# Patient Record
Sex: FEMALE | HISPANIC OR LATINO | Employment: UNEMPLOYED | URBAN - METROPOLITAN AREA
[De-identification: names, ages, dates, MRNs, and addresses within clinical notes are randomized per-mention and may not be internally consistent; named-entity substitution may affect disease eponyms.]

---

## 2023-02-21 ENCOUNTER — OFFICE VISIT (OUTPATIENT)
Dept: OBGYN | Facility: CLINIC | Age: 33
End: 2023-02-21
Payer: COMMERCIAL

## 2023-02-21 VITALS
DIASTOLIC BLOOD PRESSURE: 84 MMHG | HEART RATE: 76 BPM | WEIGHT: 192.8 LBS | RESPIRATION RATE: 12 BRPM | SYSTOLIC BLOOD PRESSURE: 138 MMHG | TEMPERATURE: 96.7 F

## 2023-02-21 DIAGNOSIS — L90.0 LICHEN SCLEROSUS: ICD-10-CM

## 2023-02-21 DIAGNOSIS — N90.89 VULVAR MASS: Primary | ICD-10-CM

## 2023-02-21 PROCEDURE — 88305 TISSUE EXAM BY PATHOLOGIST: CPT | Performed by: PATHOLOGY

## 2023-02-21 PROCEDURE — 99203 OFFICE O/P NEW LOW 30 MIN: CPT | Mod: 25 | Performed by: OBSTETRICS & GYNECOLOGY

## 2023-02-21 PROCEDURE — 11420 EXC H-F-NK-SP B9+MARG 0.5/<: CPT | Performed by: OBSTETRICS & GYNECOLOGY

## 2023-02-21 RX ORDER — CLOBETASOL PROPIONATE 0.5 MG/G
OINTMENT TOPICAL DAILY PRN
Qty: 30 G | Refills: 3 | Status: SHIPPED | OUTPATIENT
Start: 2023-02-21 | End: 2023-08-14

## 2023-02-21 NOTE — NURSING NOTE
Initial /84 (BP Location: Right arm, Patient Position: Sitting, Cuff Size: Adult Regular)   Pulse 76   Temp (!) 96.7  F (35.9  C) (Tympanic)   Resp 12   Wt 87.5 kg (192 lb 12.8 oz)   LMP 02/14/2023 (Approximate)  There is no height or weight on file to calculate BMI. .

## 2023-02-22 NOTE — PROGRESS NOTES
[facility]    Gynecology Office Note    CC:   Chief Complaint   Patient presents with     Consult        HPI: Asmita Frye is a 33 year old who presents for removal of vulvar lesion as well as evaluation for possible vulvar condition. Patient reports a vulvar lesion starting to grow about 3 years ago. Has slowly enlarged since then. Lesion of question thought to be a skin tag. It is getting caught in her underwear and is very bothersome due to it's location in the groin. Otherwise is not causing pain, no drainage from the area. Additionally, reports a history of intermittent vulvar irritation and itching. Was evaluated and found to have a white area around her labia that she would like examined.     GYN Hx:     Patient's last menstrual period was 02/14/2023 (approximate).    Cycle: regular, monthly  Contraception: none, desiring pregnancy  Last Pap Smear: patient reports this was completed in the past year  Abnormal Pap Smears: denies  Sexual Activity: currently sexually active with     ROS: A 10 pt ROS was completed and found to be otherwise negative unless mentioned in the HPI.     PMH:   History reviewed. No pertinent past medical history.    PSHx:   History reviewed. No pertinent surgical history.    Medications:   No current outpatient medications on file prior to visit.  No current facility-administered medications on file prior to visit.      Allergies:    No Known Allergies    Social History:   Social History     Socioeconomic History     Marital status:      Spouse name: Not on file     Number of children: Not on file     Years of education: Not on file     Highest education level: Not on file   Occupational History     Not on file   Tobacco Use     Smoking status: Some Days     Types: Cigarettes     Smokeless tobacco: Never   Substance and Sexual Activity     Alcohol use: Not Currently     Drug use: Not on file     Sexual activity: Not on file   Other Topics  Concern     Not on file   Social History Narrative     Not on file     Social Determinants of Health     Financial Resource Strain: Not on file   Food Insecurity: Not on file   Transportation Needs: Not on file   Physical Activity: Not on file   Stress: Not on file   Social Connections: Not on file   Intimate Partner Violence: Not on file   Housing Stability: Not on file         Family History:   History reviewed. No pertinent family history.    Physical Exam:   Vitals:    02/21/23 1342   BP: 138/84   BP Location: Right arm   Patient Position: Sitting   Cuff Size: Adult Regular   Pulse: 76   Resp: 12   Temp: (!) 96.7  F (35.9  C)   TempSrc: Tympanic   Weight: 87.5 kg (192 lb 12.8 oz)      There is no height or weight on file to calculate BMI.    General appearance: well-hydrated, A&O x 3, no apparent distress  Lungs: Equal expansion bilaterally, no accessory muscle use  Heart: No heaves or thrills.   Constitutional: See vitals  Neuro: CN II-XII grossly intact  Genitourinary:  External genitalia: vulvar lesion on the right inferior lateral labia majora, bulbous skin tag appearing lesion, approximately 1.5cm in diameter. Additionally on the labia minor and majora, white skin changes consistent with lichen sclerosus. Fusion of labia minora or majora.   Anus and Perineum: white skin changes in the perineum consistent with lichen sclerosus, not involving much of the perianal tissue  Vagina: Normal, healthy pink mucosa without any lesions. Physiologic vaginal discharge.     Procedure Note    Asmita Frye  1990  [id number]    Indications:    Asmita Frye is a 33 year old year old female, who is here for removal of vulvar lesion.     The patient was counseled on the risks (including including risk of infection, bleeding, recurrence), benefits, and alternatives of the procedure. Verbal and written consent were obtained.    Procedure:     Patient was placed in the dorsal lithotomy position with legs  supported in [facility]. The lesion of question was located on the right inferior lateral labia majora, approximately 1.5cm in diameter, bulbous type of skin tag. The area was cleaned with betadine swabs. Lidocaine 1% was injected for local anesthetic. An elliptical incision was made to excise the lesion. The incision was closed in two layers using #0 Vicryl.     Post Procedure:    There were no complications. Patient tolerated the procedure well. She was given post op instructions which included activity and pelvic restrictions.  Pathology pending, further intervention as needed based on results.       Assessment and Plan:     Encounter Diagnoses   Name Primary?     Lichen sclerosus      Vulvar mass Yes     Vulvar lesion removed today.     Exam also consistent with lichen sclerosus. Discussed diagnosis, treatment, and prognosis. Discussed chronic nature of condition and association with JEMMA, need for annual evaluation. Rx for clobetasol sent to pharmacy. Instructed to use as needed, currently asymptomatic.     Health maintenance: per patient, pap smear is up to date, no history of abnormals    Return to clinic annually and as needed.    Melodie Sesay,

## 2023-03-05 LAB
PATH REPORT.COMMENTS IMP SPEC: NORMAL
PATH REPORT.COMMENTS IMP SPEC: NORMAL
PATH REPORT.FINAL DX SPEC: NORMAL
PATH REPORT.GROSS SPEC: NORMAL
PATH REPORT.MICROSCOPIC SPEC OTHER STN: NORMAL
PATH REPORT.RELEVANT HX SPEC: NORMAL

## 2023-04-05 ENCOUNTER — TELEPHONE (OUTPATIENT)
Dept: OBGYN | Facility: CLINIC | Age: 33
End: 2023-04-05
Payer: COMMERCIAL

## 2023-04-05 NOTE — LETTER
April 5, 2023      Asmita Frye  [id number] [facility] [id number]    Dear Asmita,    To ensure we are providing the best quality care, we have reviewed your chart and see that you are due for:    Cervical Cancer Screening:    Please call Dept: [phone] to schedule an Annual Exam with Pap Smear.    If you have completed the tests outside of [facility], please have the results forwarded to our office Fax # [phone]. We will update the chart for your primary Physician to review before your next annual physical.    Thank you for trusting us with your health care.          Sincerely,      Melodie Sseay DO

## 2023-04-05 NOTE — TELEPHONE ENCOUNTER
Panel Management Review      Health Maintenance List    Health Maintenance   Topic Date Due     NICOTINE/TOBACCO CESSATION COUNSELING Q 1 YR  Never done     ADVANCE CARE PLANNING  Never done     Pneumococcal Vaccine: Pediatrics (0 to 5 Years) and At-Risk Patients (6 to 64 Years) (1 - PCV) Never done     HIV SCREENING  Never done     HEPATITIS C SCREENING  Never done     PAP  Never done     YEARLY PREVENTIVE VISIT  11/26/2019     PHQ-2 (once per calendar year)  Never done     DTAP/TDAP/TD IMMUNIZATION (3 - Td or Tdap) 03/27/2031     INFLUENZA VACCINE  Completed     HEPATITIS B IMMUNIZATION  Completed     COVID-19 Vaccine  Completed     IPV IMMUNIZATION  Aged Out     MENINGITIS IMMUNIZATION  Aged Out       Composite cancer screening  Chart review shows that this patient is due/due soon for the following Pap Smear  No results found for: PAP  No past surgical history on file.    Is hysterectomy listed in surgical history? No   Is mastectomy listed in surgical history? No     Summary:    Patient is due/failing the following:   Pap Smear    Action needed: Patient needs office visit for Pap smear.    Type of outreach:  Sent letter.      Staff Signature:  Don RIVERA

## 2023-05-18 ENCOUNTER — VIRTUAL VISIT (OUTPATIENT)
Dept: OBGYN | Facility: CLINIC | Age: 33
End: 2023-05-18
Payer: COMMERCIAL

## 2023-05-18 DIAGNOSIS — Z34.80 PRENATAL CARE, SUBSEQUENT PREGNANCY: ICD-10-CM

## 2023-05-18 PROCEDURE — 99207 PR NO CHARGE NURSE ONLY: CPT

## 2023-05-18 RX ORDER — PRENATAL VIT/IRON FUM/FOLIC AC 27MG-0.8MG
1 TABLET ORAL DAILY
COMMUNITY

## 2023-05-18 NOTE — PROGRESS NOTES
Lifestyle and nutrition teaching completed  with using over the phone   Dora Gonzalez OB Intake Nurse    Patient supplied answers from flow sheet for:  Prenatal OB Questionnaire.  Past Medical History  Have you ever recieved care for your mental health? : No  Have you ever been in a major accident or suffered serious trauma?: No  Within the last year, has anyone hit, slapped, kicked or otherwise hurt you?: No  In the last year, has anyone forced you to have sex when you didn't want to?: No    Past Medical History 2   Have you ever received a blood transfusion?: No  Would you accept a blood transfusion if was medically recommended?: Yes  Does anyone in your home smoke?: (!) Yes ()   Is your blood type Rh negative?: Unknown  Have you ever ?: (!) Yes  Have you been hospitalized for a nonsurgical reason excluding normal delivery?: No  Have you ever had an abnormal pap smear?: No    Past Medical History (Continued)  Do you have a history of abnormalities of the uterus?: No  Did your mother take SYD or any other hormones when she was pregnant with you?: Unknown  Do you have any other problems we have not asked about which you feel may be important to this pregnancy?: No

## 2023-05-21 LAB
ABO/RH(D): NORMAL
ANTIBODY SCREEN: NEGATIVE
SPECIMEN EXPIRATION DATE: NORMAL

## 2023-05-22 ENCOUNTER — PRENATAL OFFICE VISIT (OUTPATIENT)
Dept: OBGYN | Facility: CLINIC | Age: 33
End: 2023-05-22
Payer: COMMERCIAL

## 2023-05-22 VITALS
TEMPERATURE: 99.6 F | HEART RATE: 86 BPM | BODY MASS INDEX: 37.95 KG/M2 | HEIGHT: 61 IN | WEIGHT: 201 LBS | SYSTOLIC BLOOD PRESSURE: 122 MMHG | RESPIRATION RATE: 16 BRPM | DIASTOLIC BLOOD PRESSURE: 70 MMHG

## 2023-05-22 DIAGNOSIS — E66.812 CLASS 2 OBESITY WITHOUT SERIOUS COMORBIDITY IN ADULT, UNSPECIFIED BMI, UNSPECIFIED OBESITY TYPE: ICD-10-CM

## 2023-05-22 DIAGNOSIS — Z34.81 PRENATAL CARE, SUBSEQUENT PREGNANCY IN FIRST TRIMESTER: Primary | ICD-10-CM

## 2023-05-22 DIAGNOSIS — O21.9 NAUSEA AND VOMITING DURING PREGNANCY: ICD-10-CM

## 2023-05-22 LAB
ALBUMIN UR-MCNC: NEGATIVE MG/DL
APPEARANCE UR: CLEAR
BACTERIA #/AREA URNS HPF: ABNORMAL /HPF
BILIRUB UR QL STRIP: NEGATIVE
COLOR UR AUTO: YELLOW
ERYTHROCYTE [DISTWIDTH] IN BLOOD BY AUTOMATED COUNT: 13 % (ref 10–15)
GLUCOSE UR STRIP-MCNC: NEGATIVE MG/DL
HCT VFR BLD AUTO: 36.5 % (ref 35–47)
HGB BLD-MCNC: 12.4 G/DL (ref 11.7–15.7)
HGB UR QL STRIP: NEGATIVE
KETONES UR STRIP-MCNC: NEGATIVE MG/DL
LEUKOCYTE ESTERASE UR QL STRIP: NEGATIVE
MCH RBC QN AUTO: 31.1 PG (ref 26.5–33)
MCHC RBC AUTO-ENTMCNC: 34 G/DL (ref 31.5–36.5)
MCV RBC AUTO: 92 FL (ref 78–100)
NITRATE UR QL: NEGATIVE
PH UR STRIP: 7 [PH] (ref 5–7)
PLATELET # BLD AUTO: 280 10E3/UL (ref 150–450)
RBC # BLD AUTO: 3.99 10E6/UL (ref 3.8–5.2)
RBC #/AREA URNS AUTO: ABNORMAL /HPF
SP GR UR STRIP: <=1.005 (ref 1–1.03)
SQUAMOUS #/AREA URNS AUTO: ABNORMAL /LPF
UROBILINOGEN UR STRIP-ACNC: 0.2 E.U./DL
WBC # BLD AUTO: 8.9 10E3/UL (ref 4–11)
WBC #/AREA URNS AUTO: ABNORMAL /HPF

## 2023-05-22 PROCEDURE — 81001 URINALYSIS AUTO W/SCOPE: CPT | Performed by: OBSTETRICS & GYNECOLOGY

## 2023-05-22 PROCEDURE — 87340 HEPATITIS B SURFACE AG IA: CPT | Performed by: OBSTETRICS & GYNECOLOGY

## 2023-05-22 PROCEDURE — 86762 RUBELLA ANTIBODY: CPT | Performed by: OBSTETRICS & GYNECOLOGY

## 2023-05-22 PROCEDURE — 87491 CHLMYD TRACH DNA AMP PROBE: CPT | Performed by: OBSTETRICS & GYNECOLOGY

## 2023-05-22 PROCEDURE — 87086 URINE CULTURE/COLONY COUNT: CPT | Performed by: OBSTETRICS & GYNECOLOGY

## 2023-05-22 PROCEDURE — 36415 COLL VENOUS BLD VENIPUNCTURE: CPT | Performed by: OBSTETRICS & GYNECOLOGY

## 2023-05-22 PROCEDURE — 86850 RBC ANTIBODY SCREEN: CPT | Performed by: OBSTETRICS & GYNECOLOGY

## 2023-05-22 PROCEDURE — 87389 HIV-1 AG W/HIV-1&-2 AB AG IA: CPT | Performed by: OBSTETRICS & GYNECOLOGY

## 2023-05-22 PROCEDURE — 86901 BLOOD TYPING SEROLOGIC RH(D): CPT | Performed by: OBSTETRICS & GYNECOLOGY

## 2023-05-22 PROCEDURE — G0145 SCR C/V CYTO,THINLAYER,RESCR: HCPCS | Performed by: OBSTETRICS & GYNECOLOGY

## 2023-05-22 PROCEDURE — 85027 COMPLETE CBC AUTOMATED: CPT | Performed by: OBSTETRICS & GYNECOLOGY

## 2023-05-22 PROCEDURE — 86803 HEPATITIS C AB TEST: CPT | Performed by: OBSTETRICS & GYNECOLOGY

## 2023-05-22 PROCEDURE — 86900 BLOOD TYPING SEROLOGIC ABO: CPT | Performed by: OBSTETRICS & GYNECOLOGY

## 2023-05-22 PROCEDURE — 86780 TREPONEMA PALLIDUM: CPT | Performed by: OBSTETRICS & GYNECOLOGY

## 2023-05-22 PROCEDURE — 87624 HPV HI-RISK TYP POOLED RSLT: CPT | Performed by: OBSTETRICS & GYNECOLOGY

## 2023-05-22 PROCEDURE — 87591 N.GONORRHOEAE DNA AMP PROB: CPT | Performed by: OBSTETRICS & GYNECOLOGY

## 2023-05-22 PROCEDURE — 76817 TRANSVAGINAL US OBSTETRIC: CPT | Performed by: OBSTETRICS & GYNECOLOGY

## 2023-05-22 PROCEDURE — 99213 OFFICE O/P EST LOW 20 MIN: CPT | Mod: 25 | Performed by: OBSTETRICS & GYNECOLOGY

## 2023-05-22 RX ORDER — ONDANSETRON 4 MG/1
4 TABLET, ORALLY DISINTEGRATING ORAL EVERY 8 HOURS PRN
Qty: 15 TABLET | Refills: 3 | Status: SHIPPED | OUTPATIENT
Start: 2023-05-22 | End: 2023-08-14

## 2023-05-22 RX ORDER — PRENATAL VIT/IRON FUM/FOLIC AC 27MG-0.8MG
1 TABLET ORAL DAILY
Qty: 90 TABLET | Refills: 3 | Status: ON HOLD | OUTPATIENT
Start: 2023-05-22 | End: 2023-12-23

## 2023-05-22 NOTE — PROGRESS NOTES
[facility]    New OB Visit Note    CC: New OB     Subjective:    Asmita is a 33 year old  at 8w0d by LMP who presents for her initial OB visit. History of long cycles, about every 6 weeks. This is a planned pregnancy and her and her  are excited. She reports feeling well. Denies any uterine cramping, abdominal pain or vaginal bleeding. Reports some nausea and vomiting.     Past OB History:    Prenatal Complications: None   Delivery Complications: C section for failure to descend (question failed vacuum per history). Repeat C section.    OB History    Para Term  AB Living   3 2 2 0 0 2   SAB IAB Ectopic Multiple Live Births   0 0 0 0 2      # Outcome Date GA Lbr [place]/2nd Weight Sex Delivery Anes PTL Lv   3 Current            2 Term 16 39w0d  3.43 kg (7 lb 9 oz) M CS-Unspec   ROYAL      Birth Comments: CAN      Name: Mario Barrientos Term 12/29/10 40w0d  3.629 kg (8 lb) M CS-Unspec   ROYAL      Complications: Failure to Progress in Second Stage      Name: Roman       Past Medical History:   Diagnosis Date     Chickenpox        Past Surgical History:   Procedure Laterality Date     APPENDECTOMY         Current Outpatient Medications   Medication Sig Dispense Refill     ondansetron (ZOFRAN ODT) 4 MG ODT tab Take 1 tablet (4 mg) by mouth every 8 hours as needed for nausea 15 tablet 3     Prenatal Vit-Fe Fumarate-FA (PRENATAL MULTIVITAMIN W/IRON) 27-0.8 MG tablet Take 1 tablet by mouth daily 90 tablet 3     Prenatal Vit-Fe Fumarate-FA (PRENATAL MULTIVITAMIN W/IRON) 27-0.8 MG tablet Take 1 tablet by mouth daily       clobetasol (TEMOVATE) 0.05 % external ointment Apply topically daily as needed (For itching) (Patient not taking: Reported on 2023) 30 g 3       Family History   Problem Relation Age of Onset     Heart Disease Father        Social History     Tobacco Use     Smoking status: Former     Types: Cigarettes     Quit date: 2023     Years since quittin.3  "    Smokeless tobacco: Never   Vaping Use     Vaping status: Never Used   Substance Use Topics     Alcohol use: Not Currently       ROS: A 10 pt ROS was completed and found to be negative unless mentioned in the HPI.     Objective:    /70 (BP Location: Left arm, Patient Position: Chair, Cuff Size: Adult Regular)   Pulse 86   Temp 99.6  F (37.6  C) (Tympanic)   Resp 16   Ht 1.549 m (5' 1\")   Wt 91.2 kg (201 lb)   LMP 2023   BMI 37.98 kg/m      Estimated body mass index is 37.98 kg/m  as calculated from the following:    Height as of this encounter: 1.549 m (5' 1\").    Weight as of this encounter: 91.2 kg (201 lb).    General appearance: well-hydrated, A&O x 3, no apparent distress  Lungs: Equal expansion bilaterally, no accessory muscle use  Heart: No heaves or thrills. No peripheral varicosities  Constitutional: See vitals  Extremities: no edema  Genitourinary:  External genitalia: no erythema, no lesions.   Anus and Perineum: Unremarkable, no visible lesions  Vagina: Normal, healthy pink mucosa without any lesions. Physiologic vaginal discharge.   Cervix: normal appearance, no cervical motion tenderness.   Uterus: gravid    Bedside Ultrasound    Transvaginal ultrasound was performed. A single live intrauterine pregnancy was seen.      CRL= 1.06 cm   FHT= 154 bpm  EGA= 7 weeks 1 days  EDC based on US = 24  EDC by LMP= 24  FINAL EDC= 24    Assessment and Plan:     Encounter Diagnoses   Name Primary?     Prenatal care, subsequent pregnancy in first trimester Yes     Nausea and vomiting during pregnancy      Class 2 obesity without serious comorbidity in adult, unspecified BMI, unspecified obesity type        33 year old  at 7w1d by US today who presents for her initial OB visit.     1) Plan to draw new OB lab today   2) Discussed routine prenatal care, group practice model at [facility], tertiary support and frequency of visits. Also discussed options for genetic screening " tests. Patient declines genetic screening.   3) Dating/viability US performed today   4) Concerns: Some nausea, Rx for Zofran  5) PMH/OBHx problems: History of C section x2: likely would recommend repeat C section due to failure to descend and 2 prior C sections. Would need delivery at [facility] if she desires T[facility]. Will continue to review options and fetal size at time of delivery.   6) Medication review: see above, continue prenatal vitamin   7) Reviewed miscarriage precautions (present to ED or call clinic with abdominal pain, vaginal bleeding or fever)   8) Weight gain: discussed weight gain expectations (BMI <18.5: 28-40lbs, BMI 18.5-25: 25-35lbs, BMI 25-30: 15-25lbs, BMI >30: 11-20lbs)   9) PAP smear: collected today  10) Delivery plan: continue to discuss route of delivery, breastfeeding, pp contraception, pain management in labor  11) Immunizations: Tdap at 28wks  12) No increased risk for gestational diabetes, plan for screen at 28wks     Return to clinic in 4 weeks.     Melodie Sesay DO

## 2023-05-22 NOTE — PATIENT INSTRUCTIONS
"    Pregnancy: Your First Trimester Changes  The first trimester is a time of rapid development for your baby. Because your baby is growing so quickly, it is important that you start a healthy lifestyle right away. By the end of the first trimester, your baby has formed all of its major body organs and weighs just over an ounce.  Month 1 (weeks 1 to 4)  The placenta (the organ that nourishes your baby) begins to form. The brain, spinal cord, heart, gastrointestinal tract, and lungs begin to develop. Your baby is about   inch long by the end of the first month.     Actual size of baby is 1/4\".     Month 2 (weeks 5 to 8)  All of your baby s major body organs form. The face, fingers, toes, ears, and eyes appear. By the end of the month, your baby is about 1 inch long.     Actual size of baby is 1\".     Month 3 (weeks 9 to 12)  Your baby can open and close its fists and mouth. The sexual organs begin to form. As the first trimester ends, your baby is about 3 inches long.     Actual size of baby is 3\".     [employer] last reviewed this educational content on 8/1/2020 2000-2022 [employer]. All rights reserved. This information is not intended as a substitute for professional medical care. Always follow your healthcare professional's instructions.          Vitamin B6 (pyridoxine) Oral Tablet  Uses  This medicine is used for the following purposes:    metabolism disorder    nausea and vomiting    vitamin deficiency  Instructions  This medicine may be taken with or without food.  Store at room temperature away from heat, light, and moisture. Do not keep in the bathroom.  If you forget to take a dose on time, take it as soon as you remember. If it is almost time for the next dose, do not take the missed dose. Return to your normal dosing schedule. Do not take 2 doses of this medicine at one time.  Drug interactions can change how medicines work or increase risk for side effects. Tell your health care providers " about all medicines taken. Include prescription and over-the-counter medicines, vitamins, and herbal medicines. Speak with your doctor or pharmacist before starting or stopping any medicine.  Speak with your doctor or pharmacist before starting any other vitamins.  It is very important that you follow your doctor's instructions for all blood tests.  Cautions  Tell your doctor and pharmacist if you ever had an allergic reaction to a medicine.  Do not use the medication any more than instructed.  Tell the doctor or pharmacist if you are pregnant, planning to be pregnant, or breastfeeding.  Side Effects  If you have any of the following side effects, you may be getting too much medicine. Please contact your doctor to let them know about these side effects.    drowsiness or sedation    numbness or tingling in hands and feet    headaches    nausea    stomach upset or abdominal pain  This medicine usually has no side effects.  A few people may have an allergic reaction to this medicine. Symptoms can include difficulty breathing, skin rash, itching, swelling, or severe dizziness. If you notice any of these symptoms, seek medical help quickly.  Extra  Please speak with your doctor, nurse, or pharmacist if you have any questions about this medicine.  [email or web address]  IMPORTANT NOTE: This document tells you briefly how to take your medicine, but it does not tell you all there is to know about it. Your doctor or pharmacist may give you other documents about your medicine. Please talk to them if you have any questions. Always follow their advice. There is a more complete description of this medicine available in English. Scan this code on your smartphone or tablet or use the web address below. You can also ask your pharmacist for a printout. If you have any questions, please ask your pharmacist. The display and use of this drug information is subject to Terms of Use. Copyright(c) 2022 [employer]     4114-7498 [employer]. All rights reserved. This information is not intended as a substitute for professional medical care. Always follow your healthcare professional's instructions.          Adapting to Pregnancy: First Trimester  As your body adjusts during your first trimester of pregnancy, you may have to change or limit your daily activities. You ll need more rest. You may also need to use the energy you have more wisely.   Your changing body  Almost every part of your body is affected as you adapt to pregnancy. The uterus and cervix will start to soften right away. You may not look very pregnant during the first 3 months. But you are likely to have some common signs of early pregnancy:     Nausea    Fatigue    Frequent urination    Mood swings    Bloating of the belly    Constipation    Heartburn    Missed or light periods (first trimester bleeding)    Nipple or breast tenderness and breast swelling  It s not too late to start good habits   What matters most is protecting your baby from this moment on. If you smoke, drink alcohol, or use drugs, now is the time to stop. If you need help, talk with your healthcare provider:     Smoking increases the risk of stillbirth or having a low-birth-weight baby. If you smoke, quit now.    Alcohol and drugs have been linked with miscarriage, birth defects, intellectual disability, and low birth weight. Don't drink alcohol or take drugs.  Tips to relieve nausea  During pregnancy, nausea can happen at any time of the day, but it may be worse in the morning. To help prevent nausea:     Eat small, light meals at frequent intervals.    Drink fluids often.    Get up slowly. Eat a few unsalted crackers before you get out of bed.    Avoid smells that bother you.    Avoid spicy and fatty foods.    Eat an ice pop in your favorite flavor.    Get plenty of rest.    Ask your healthcare provider about taking jacinta or vitamin B6 for nausea and vomiting.    Talk  with your healthcare provider if you take vitamins that upset your stomach.   Work concerns  The end of the first trimester is a good time to discuss working during pregnancy with your employer. Follow your healthcare provider s advice if your job needs you to stand for a long time, work with hazardous tools, or even sit at a desk all day. Your workspace, workload, or scheduled hours may need to be adjusted. Perhaps you can change body postures more often or take an extra break.   Advice for travel  Talk to your healthcare provider first, but the second trimester may be the best time for any travel. You may be advised to avoid certain trips while you re pregnant. Food and water can be concerns in developing countries. Travel by car is a good choice, as you can stop, get out, and stretch. Bring snacks and water along. Fasten the lap belt below your belly, low over your hips. Also be sure to wear the shoulder harness.   Intimacy  Unless your healthcare provider tells you to, there's no reason to stop having sex while you re pregnant. You or your partner may notice changes in desire. Desire may be less in the first trimester, due to nausea and fatigue. In the second trimester, sex may be very enjoyable. The third trimester can be a challenge comfort-wise. Try different positions and see what s best for you both.   [employer] last reviewed this educational content on 4/1/2020 2000-2022 [employer]. All rights reserved. This information is not intended as a substitute for professional medical care. Always follow your healthcare professional's instructions.          Pregnancy: Common Questions  There are plenty of myths and  old wives  tales  about pregnancy. You may need help  fact from fiction. On this sheet, you ll find answers to a few common questions. If you have other questions, talk with your healthcare provider.    Will working harm my baby?  In most cases, working throughout your  pregnancy is not harmful at all. There may be concerns if the job involves dangerous machinery or chemicals, lifting, or standing for very long periods of time. Talk with your healthcare provider and employer about your particular job and pregnancy.  Is it safe to have sex during pregnancy?  Yes, unless you are specifically advised not to by your healthcare provider.  Why can t I change the cat litter box?  Cats carry a disease called toxoplasmosis. In adult humans, it shows up as a mild infection of the blood and organs. If you are infected during pregnancy, the baby s brain and eyes could be damaged. To be safe, have someone else change the litter. If you must handle it, wear a paper mask over your nose and mouth. Also, wear gloves and wash your hands afterward.  Which medicines are safe?  No prescription or over-the-counter medicine is safe for everyone all of the time. But sometimes medicines are needed. Be sure your healthcare provider knows you are pregnant. Then use only the medicines he or she advises you to take.  Is it true that I can overheat my baby?  Yes. To prevent making your baby too warm:    Don t sit in a Jacuzzi. A long, warm bath is fine, but not in water over 100 F (37.7 C).    Exercise less intensely if you feel tired. Base your workout on how you feel, not your heart rate. Heart rates aren t a good way to measure effort during pregnancy.  Can I lift and carry safely?  Yes, if your healthcare provider doesn t tell you otherwise. Learn to lift and carry safely to prevent injury and reduce back pain during pregnancy. To protect your back:    Bend at the knees to bring the load nearer.    Get a good . Test the weight of the load.    Tighten your belly. Exhale as you lift.    Lift with your legs, not with your back.    Carry the load close to your body.    Hold the load so you can see where you are going.  What if I get sick?  Most women get sick at least once during pregnancy. Talk with your  healthcare provider if you do. Most likely it will not affect your pregnancy. Get plenty of rest and fluids, and eat what you can. Talk with your provider before taking any medicines.  [employer] last reviewed this educational content on 8/1/2020 2000-2022 [employer]. All rights reserved. This information is not intended as a substitute for professional medical care. Always follow your healthcare professional's instructions.          Comfort Tips During Pregnancy  Pregnancy can bring discomfort of different kinds. Below are tips for ways to feel better. Talk with your healthcare provider before using pain-relieving medicine at any time during your pregnancy.    First trimester tips  Easing nausea    Get up slowly. Eat a few unsalted crackers before you get out of bed.    Avoid smells that bother you.    Eat small, bland, low-fat, high-protein meals at frequent intervals.    Sip on water, weak tea, or clear soft drinks, like ginger ale. Eat ice chips.    Try taking vitamin B6.  Coping with fatigue    Take catnaps when you can.    Get regular exercise.    Accept help from others.    Practice good sleep habits, like going to bed and getting up at the same time each day. Use your bed only for sleep and sex.  Calming mood swings    Talk about your feelings with others, including other mothers.    Limit sugar, chocolate, and caffeine.    Eat a healthy diet. Don t skip meals.    Get regular exercise.  Soothing headaches    Get fresh air and exercise.    Relax and get enough rest.    Check with your healthcare provider before taking any pain medicines.    Second trimester tips    To limit ankle swelling, sit with your feet raised or wear support hose.    If you have pain in your groin and stomach (round ligament pain), don't make sudden twisting movements with your body.    For leg cramps, flexing your foot often brings immediate relief. Also try massaging your calf in long, downward strokes, or stretching  your legs before going to bed. Get enough exercise and wear shoes with flexible soles. Eat foods rich in calcium.    Third trimester tips  Reducing heartburn    Eat small, light meals throughout the day rather than 3 large ones.    Sleep with your upper body raised 6 inches. Don t lie down until 2 hours after you eat.    Don't eat greasy, fried, or spicy foods.    Don't have citrus fruits or juices.  Treating constipation    Eat foods high in fiber, such as whole-grain foods, and fresh fruit and vegetables).    Drink plenty of water.    Get regular exercise.    Ask about your healthcare provider about medicines that have docusate or psyllium.  Taking care of your breasts    Don't use harsh soaps or alcohol, which can make your skin too dry.    Wear nursing bras. They provide more support than regular bras and can be used after pregnancy if you breastfeed.  Getting a good night s sleep    Take a warm shower before bed.    Sleep on a firm mattress.    Lie on your side with 1 leg crossed over the other.    Use pillows to support your arms, legs, and belly.    [employer] last reviewed this educational content on 8/1/2020 2000-2022 [employer]. All rights reserved. This information is not intended as a substitute for professional medical care. Always follow your healthcare professional's instructions.          Folic Acid Supplements  Folic acid is also called folate. It is one of the B vitamins. Nutrition experts are just starting to learn more about how folic acid helps the body. It is needed to prevent a shortage of red blood cells (a type of anemia). Experts have also found that folic acid can prevent some birth defects.     How much folic acid do you need?  Adults should have 400 mcg (micrograms) of folic acid each day. Adults may need more if they are planning to get pregnant, are pregnant, or are breastfeeding. Talk with your healthcare provider to find the right amount of folic acid for you.   Why  use a supplement?  Taking folic acid both before and during pregnancy is important. This can prevent birth defects of the spine and brain (neural tube defects). A supplement may also be helpful if you drink alcohol often. You may want to use a folic acid supplement if any of the following is true for you:   ? I am a person of childbearing age.   ? I am planning to get pregnant.  ? I rarely eat leafy green vegetables, dried beans, or lentils.   ? I rarely eat cereal and whole grains (wheat germ, brown rice, whole-wheat bread).  ? I often have more than 1 drink of alcohol a day.   If you take folic acid  Here are some tips to help you get the most from a folic acid supplement:    Read the label to be sure the product won't  soon.    Choose a supplement that provides 400 to 800 mcg of folic acid.    Store the supplement in a cool, dry place, away from sun and heat.    Eat a healthy diet to get all the nutrients your body needs.  Foods that have folic acid  Folic acid is found mainly in plants. Some good sources include:    Dark green leafy vegetables such as spinach, kale, collards, and mary lettuce    Lentils, chickpeas, and dried beans such as sheehan, kidney, and black beans    Asparagus, bok bing, broad-beans, broccoli, and [place] sprouts    Avocados, oranges, and orange juice    Wheat germ and whole-wheat products    Products fortified with folic acid, such as cereal, pasta, bread, and rice  [employer] last reviewed this educational content on 2022-2022 [employer]. All rights reserved. This information is not intended as a substitute for professional medical care. Always follow your healthcare professional's instructions.          Anemia During Pregnancy  Anemia is a condition in which the red blood cell count is too low. In pregnant women, this is often caused by not having enough iron in the blood. Anemia is common in pregnancy and very easy to treat.   Why you need iron   While  pregnant, your body uses iron to make red blood cells for you and your baby. These cells bring oxygen to your baby and to the rest of your body. Not having enough red blood cells can cause your baby to be born too small. But this is rare, as it s easy for you to get enough iron.   Testing for anemia   The only way to know if you have anemia is to have a simple test called a CBC (complete blood count). This is a routine test that will be done at one of your first prenatal visits. This test may be done again, at about week 26 to week 28.   Treating anemia   If you have anemia due to low iron content, follow the advice of your healthcare provider. Eating foods high in iron and taking supplements can help you get the iron you need.   Eating foods high in iron      Green leafy vegetables and nuts are a good source of iron.     Eat foods that are high in iron such as:     Red meat (limit organ meats such as liver)    Seafood (be sure it s fully cooked), and don't eat fish that are high in mercury, such as swordfish, tilefish, adelaida mackerel, and shark    Tofu    Eggs    Green, leafy vegetables    Whole grains and iron-fortified cereals    Dried fruits and nuts  Taking iron supplements   In most cases, a prenatal vitamin can provide enough iron. But if you need more, your healthcare provider may prescribe an iron supplement. Swallow iron pills with a glass of orange or cranberry juice. The vitamin C in these fruit juices can help your body absorb iron. But don t take your iron pills with juices that have calcium added to them. They can keep your body from absorbing the iron.   Iron supplements   Iron supplements may have certain side effects. They may cause your stools to turn black, and make you feel sick to your stomach or constipated. Here are some tips that may help you limit side effects:     Start slowly. Take 1 pill a day for a few days. Then work up to your prescribed dose over time.    Take your pills with meals,  and not at bedtime.    Increase the fiber in your diet. Eat more whole grains, fruits, and vegetables.    Do mild exercise each day.    If advised by your healthcare provider, take a stool softener.  [employer] last reviewed this educational content on 2/1/2020 2000-2022 [employer]. All rights reserved. This information is not intended as a substitute for professional medical care. Always follow your healthcare professional's instructions.          Rh-Negative Screening  If you have Rh-negative blood, your baby may be at risk for health problems. This is true only if your baby has Rh-positive blood. A simple test followed by treatment can help prevent problems.   What are the risks?  If the blood of your baby is Rh positive, your Rh-negative blood may form antibodies. These antibodies will attack the Rh-positive blood. This is called Rh disease. Rh disease can cause your baby to lose blood cells or have other health problems. Medical treatment can prevent Rh disease by keeping antibodies from forming.   How are you tested?  A simple blood test shows if you re Rh negative. This test is done very early in your pregnancy. If you re Rh negative, you ll have a second blood test near week 28 of pregnancy. This test will check whether or not your blood contains Rh antibodies.     [employer] last reviewed this educational content on 4/1/2020 2000-2022 [employer]. All rights reserved. This information is not intended as a substitute for professional medical care. Always follow your healthcare professional's instructions.          What Is Prenatal Care?  Before getting pregnant, you may have added some good health habits to get ready for your baby. But if you didn t, start today. One of the first steps is learning how to take care of yourself. See your healthcare provider as soon as you think you may be pregnant. Then continue prenatal care during your pregnancy.     Prenatal care helps you  have a healthy baby   During prenatal care:    Your healthcare provider checks the health of your pregnancy. They'll calculate a due date. This gives an estimate of your baby's delivery. Many people give birth between 38 and 41 weeks of pregnancy. Your due date is found by counting 40 weeks from the first day of your last menstrual period.    Your pregnancy's progress is checked. This includes your baby s growth, fetal heart rate, changes in your weight and blood pressure, and your overall health and comfort.    Your provider may find new concerns and manage current ones before problems happen.    Your provider will check lab work through blood and urine.    Your provider will talk about normal changes that happen during pregnancy. They'll also talk about changes that may not be normal. And they'll advise you about lifestyle changes.    Your provider will answer your questions. They'll also help you get ready for labor and delivery.  You're part of a team  When you re pregnant, you re part of a team. This team includes you, your baby, and your provider. It also may include a partner or a main support person. That could be a loved one, such as a spouse, a family member, or a friend. As you work to give your baby a healthy start, rely on your team members for support.   It s not too late to start good habits   What matters most is protecting your baby from this moment on. If you smoke, drink alcohol, or use illegal drugs, now's the time to stop. If you need help, talk with your healthcare provider.     Smoking increases the risk of losing your baby. Or of having a low-birth-weight baby. If you smoke, quit now.    Alcohol and drugs have been linked with many problems. These include miscarriage, birth defects, intellectual disability, and low birth weight. Stay away from alcohol and drugs.    Eat a healthy diet. This helps keep you and your baby strong and healthy. Follow your provider's instructions for nutrition. Also  stay within the guidelines you're given for healthy weight gain.    Take 400 micrograms to 800 micrograms (400 mcg to 800 mcg or 0.4 mg to 0.8 mg) of folic acid every day. Take it for at least 1 month before getting pregnant. And keep taking it for the first trimester of your pregnancy. This is to lower your risk of some brain and spinal birth defects. You can get folic acid from some foods. But it's hard to get all the folic acid you'll need from foods alone. Talk with your provider about taking a folic acid supplement.    Regular exercise will help you stay fit and feel good during pregnancy. It can also help prevent or reduce back pain. Talk with your provider about how to exercise safely during pregnancy.    If you have a health condition, make sure it's under control. Some conditions include asthma, diabetes, depression, high blood pressure, obesity, thyroid disease, or epilepsy. Be sure your vaccines are up to date.  How daily issues affect your health  Many things in your daily life impact your health. This can include transportation, money problems, housing, access to food, and . If you can t get to medical appointments, you may not receive the care you need. When money is tight, it may be difficult to pay for medicines. And living far from a grocery store can make it hard to buy healthy food.   If you have concerns in any of these or other areas, talk with your healthcare team. They may know of local resources to assist you. Or they may have a staff person who can help.   [employer] last reviewed this educational content on 8/1/2020 2000-2022 [employer]. All rights reserved. This information is not intended as a substitute for professional medical care. Always follow your healthcare professional's instructions.          Understanding Intimate Partner Violence  Intimate partner violence (IPV) affects hundreds of thousands of women. But the true number may be much higher because many  women may not report it. Partner violence often starts or gets worse during and after pregnancy. This may be from the stress of pregnancy and a new baby. IPV can result in pregnancy complications, poor postpartum care, and poor health of the baby.  You may feel alone if you are experiencing intimate partner violence during or after your pregnancy, but know that you re not. It s far more common than you think. And there s help and hope. Talk with your healthcare provider if you are not safe.  Types of intimate partner violence  Most people think of IPV as just physical abuse. While it does often include physical abuse, IPV can be emotional and sexual abuse. These other forms of abuse are sometimes harder to see. This is especially true for emotional abuse, because it can distort your own viewpoint.    Physical abuse includes using physical force to hurt or disable a partner. It can include throwing objects, pushing, kicking, biting, slapping, strangling, hitting, or beating.    Emotional abuse includes making threats, and controlling money or other resources. It s anything that erodes a person s sense of self-worth. It may look like name-calling, blaming, and stalking. It can also include isolation from friends, family, and even access to healthcare.    Sexual violence includes rape, unwanted kissing, and forced touching. It also includes reproductive coercion. This is holding power and control over the woman s reproductive health. It may look like efforts to sabotage contraception, having unsafe sex to purposefully expose the woman to sexually transmitted infections (STIs). It may also include forced  or injuries to cause a miscarriage.  Are you at risk for IPV?  IPV affects all genders, races, ethnicities, and social and economic statuses. But some people are at greater risk for being a victim or an abuser. Certain factors can increase the risk for IPV.  Individual factors    Having low self-esteem, being  "emotional dependent, insecure, and jealous    Having low income or being unemployed, having recent job loss or job instability    Being younger    Using alcohol or drugs    Being depressed, angry, having PTSD, or being hostile towards women    Having a history of abusing others, being abused, or witnessing abuse    Having poor problem solving skills    Having poor impulse control    Being a Native , , or  woman  Relationship factors    Marital or relationship conflict, frequent fighting    Having a jealous or possessive partner    Having control issues    Being under economic stress    Having poor social support    Having income inequality    It often helps to ask yourself these questions from the [employer] of [employer] to know if you are in an abusive relationship:    Does my partner always put me down and make me feel bad about myself?    Has my partner caused harm or pain to my body?    Does my partner threaten me, the baby, my other children or himself?    Does my partner blame me for his actions? Does he tell me it's my own fault he hit me?    Is my partner becoming more violent as time goes on?    Has my partner promised never to hurt me again, but still does?  If you answered \"yes\" to any of these questions, you may be in an unhealthy relationship.  If you recognize yourself or your partner in any of these descriptors, talk with your healthcare provider to get help. If you are in danger, he or she can help you develop a safety plan.  Health effects  IPV during or after pregnancy can cause physical and emotional health effects, pregnancy complications, poor outcomes, and injury and harm to the baby after birth.  During pregnancy    Physical injuries such as bruises, cuts, or broken bones    Vaginal bleeding or pelvic pain    Early labor and delivery    Tearing of the placenta (placental abruption)    Maternal death  Infant health    Low infant birth weight    Infant who needs NICU " care    Broken bones    Death of   After birth  After birth, the mother may:    Have pain and other long-term health conditions    Develop postpartum depression (2 to 3 times greater risk)    Have high-risk sexual behaviors    Use harmful substances    Have unhealthy diet and lifestyle such as eating disorders  Abuse is never OK. One in 6 abused women are first abused during pregnancy, when it s dangerous to both you and your developing baby. Recognizing that you are in an abusive relationship is the first step to help. See your healthcare provider or someone else you trust who can help you develop a safety plan.  What to expect from your healthcare provider  It can be a hard to bring up partner violence with your healthcare provider. But it s an important first step in getting help. Rest assured, your conversation is confidential. He or she is a non-judgmental health professional. He or she likely has other patients with similar problems and recognizes the difficulty of the situation. Your provider may ask you questions such as:    Do you feel safe in your relationship?    Do you have a safe place to go in an emergency?    Do conflicts ever turn into physical fights?  Answer honestly and completely. There will be no pressure to disclose the abuse or to press charges. He or she won t ask about the abuse in front of a partner, friends, or family. The goal is to help you access help when you are ready.  Call   If you feel your safety is in jeopardy now, call or the police.  For more help  If the person who hurt you is your partner or spouse and your situation can become dangerous again, it's vital to make a safety plan. The National Domestic Violence Hotline can help you develop a plan that meets your personal situation.    National Domestic Violence Hotline , www.theM-[place]anga.org, 499.998.2378  [employer] last reviewed this educational content on 2020-2022 [employer]. All rights  reserved. This information is not intended as a substitute for professional medical care. Always follow your healthcare professional's instructions.          Bleeding During Early Pregnancy   If you ve had bleeding early in your pregnancy, you re not alone. Many other pregnant women have early bleeding, too. And in most cases, nothing is wrong. But your healthcare provider still needs to know about it. They may want to do tests to find out why you re bleeding. Call your provider if you see bleeding during pregnancy. Tell your provider if your blood is Rh negative. Then they can figure out if you need anti-D immune globulin treatment.   What causes early bleeding?   The cause of bleeding early in pregnancy is often unknown. But many factors early on in pregnancy may lead to light bleeding (called spotting) or heavier bleeding. These include:     Having sex    When the embryo implants on the uterine wall    Bleeding between the sac membrane and the uterus (subchorionic bleeding)    Pregnancy loss (miscarriage)    The embryo implants outside of the uterus (ectopic pregnancy)  If you see spotting   Light bleeding is the most common type of bleeding in early pregnancy. If you see it, call your healthcare provider. Chances are, they will tell you that you can care for yourself at home.   If tests are needed   Depending on how much you bleed, your healthcare provider may ask you to come in for some tests. A pelvic exam, for instance, can help see how far along your pregnancy is. You also may have an ultrasound or a Doppler test. These imaging tests use sound waves to check the health of your baby. The ultrasound may be done on your belly or inside your vagina. You may also need a special blood test. This test compares your hormone levels in blood samples taken 2 days apart. The results can help your provider learn more about the implantation of the embryo. Your blood type will also need to be checked to assess if you will  need to be treated for Rh sensitization.       Ultrasound can help check the health of your fetus.     Warning signs   If your bleeding doesn t stop or if you have any of the following, get medical care right away:     Soaking a sanitary pad each hour    Bleeding like you re having a period    Cramping or severe belly pain    Feeling dizzy or faint    Tissue passing through your vagina    Bleeding at any time after the first trimester  Questions you may be asked   Bleeding early in pregnancy isn't normal. But it is common. If you ve seen any bleeding, you may be concerned. But keep in mind that bleeding alone doesn t mean something is wrong. Just be sure to call your healthcare provider right away. They may ask you questions like these to help find the cause of your bleeding:     When did your bleeding start?    Is your bleeding very light or is it like a period?    Is the blood bright red or brownish?    Have you had sex recently?    Have you had pain or cramping?    Have you felt dizzy or faint?  Monitoring your pregnancy   Bleeding will often stop as quickly as it began. Your pregnancy may go on a normal path again. You may need to make a few extra prenatal visits. But you and your baby will most likely be fine.   [employer] last reviewed this educational content on 1/1/2022 2000-2022 [employer]. All rights reserved. This information is not intended as a substitute for professional medical care. Always follow your healthcare professional's instructions.

## 2023-05-22 NOTE — NURSING NOTE
"Initial /70 (BP Location: Left arm, Patient Position: Chair, Cuff Size: Adult Regular)   Pulse 86   Temp 99.6  F (37.6  C) (Tympanic)   Resp 16   Ht 1.549 m (5' 1\")   Wt 91.2 kg (201 lb)   LMP 03/27/2023   BMI 37.98 kg/m   Estimated body mass index is 37.98 kg/m  as calculated from the following:    Height as of this encounter: 1.549 m (5' 1\").    Weight as of this encounter: 91.2 kg (201 lb). .      "

## 2023-05-23 LAB
C TRACH DNA SPEC QL PROBE+SIG AMP: NEGATIVE
HBV SURFACE AG SERPL QL IA: NONREACTIVE
HCV AB SERPL QL IA: NONREACTIVE
HIV 1+2 AB+HIV1 P24 AG SERPL QL IA: NONREACTIVE
N GONORRHOEA DNA SPEC QL NAA+PROBE: NEGATIVE
RUBV IGG SERPL QL IA: 3.7 INDEX
RUBV IGG SERPL QL IA: POSITIVE
T PALLIDUM AB SER QL: NONREACTIVE

## 2023-05-24 LAB — BACTERIA UR CULT: NORMAL

## 2023-05-25 LAB
BKR LAB AP GYN ADEQUACY: NORMAL
BKR LAB AP GYN INTERPRETATION: NORMAL
BKR LAB AP HPV REFLEX: NORMAL
BKR LAB AP PREVIOUS ABNORMAL: NORMAL
PATH REPORT.COMMENTS IMP SPEC: NORMAL
PATH REPORT.COMMENTS IMP SPEC: NORMAL
PATH REPORT.RELEVANT HX SPEC: NORMAL

## 2023-05-29 LAB
HUMAN PAPILLOMA VIRUS 16 DNA: NEGATIVE
HUMAN PAPILLOMA VIRUS 18 DNA: NEGATIVE
HUMAN PAPILLOMA VIRUS FINAL DIAGNOSIS: NORMAL
HUMAN PAPILLOMA VIRUS OTHER HR: NEGATIVE

## 2023-06-20 ENCOUNTER — PRENATAL OFFICE VISIT (OUTPATIENT)
Dept: OBGYN | Facility: CLINIC | Age: 33
End: 2023-06-20
Payer: COMMERCIAL

## 2023-06-20 VITALS
SYSTOLIC BLOOD PRESSURE: 111 MMHG | DIASTOLIC BLOOD PRESSURE: 73 MMHG | HEART RATE: 83 BPM | WEIGHT: 199.4 LBS | RESPIRATION RATE: 16 BRPM | HEIGHT: 61 IN | TEMPERATURE: 98.5 F | BODY MASS INDEX: 37.65 KG/M2

## 2023-06-20 DIAGNOSIS — Z34.81 PRENATAL CARE, SUBSEQUENT PREGNANCY IN FIRST TRIMESTER: Primary | ICD-10-CM

## 2023-06-20 DIAGNOSIS — Z98.891 HISTORY OF CESAREAN SECTION: ICD-10-CM

## 2023-06-20 PROCEDURE — 99207 PR PRENATAL VISIT: CPT | Performed by: OBSTETRICS & GYNECOLOGY

## 2023-06-20 ASSESSMENT — PATIENT HEALTH QUESTIONNAIRE - PHQ9: SUM OF ALL RESPONSES TO PHQ QUESTIONS 1-9: 8

## 2023-06-20 NOTE — PATIENT INSTRUCTIONS
Healthy Eating Habits During Pregnancy  It s important to develop healthy eating habits while you are pregnant, for you as well as for your baby. Here are some ways to stay healthy.   Aim for a healthy weight  A slow, steady rate of weight gain is often best. After the first trimester, you may gain about a pound a week. If you were overweight before pregnancy, you need to gain fewer pounds. Your healthcare provider can give you a healthy weight goal for your pregnancy.   Don t diet  Now is not the time to diet. You may not get enough of the nutrients you and your baby need. Instead, learn how to be a healthy eater. Start by doing it for your baby. Soon, you may do it for yourself.   Vitamins and supplements  Talk with your healthcare provider about taking these and other prenatal vitamins and supplements.     Iron makes the extra blood you need now.    Calcium and vitamin D help build and keep strong bones.    Folic acid helps prevent certain birth defects.    Iodine helps the thyroid work right.    Some vitamins may not be safe to take. Your healthcare provider will tell you which ones to avoid.  Fluids    Drink at least 8 to 10 cups of fluid daily. Your baby needs fluids. Fluids also decrease constipation, flush out toxins and waste, limit swelling, and help prevent bladder infections. Water is best. Other good choices are:     Water or seltzer water with a slice of lemon or lime. (These can also help ease an upset stomach.)    Clear soups that are low in salt    Low-fat or fat-free milk, soy or rice milk with calcium added    Popsicles or gelatin  Things to avoid  Some things might harm your growing baby. Don t eat or drink:     Alcohol    Unpasteurized dairy foods and juices    Raw or undercooked meat, poultry, fish, or eggs    Unwashed fruits and vegetables    Prepared meats, like deli meats or hot dogs, unless heated until steaming hot    Fish that are high in mercury, like shark, swordfish, adelaida mackerel,  tilefish, and albacore tuna  Things to limit  Ask your healthcare provider whether it s safe to eat or drink:     Caffeine    Artificial sweeteners    Organ meats    Certain types of fish    Fish and shellfish that contain mercury in lower amounts, like shrimp, canned light tuna, salmon, pollock, and catfish  [employer] last reviewed this educational content on 7/1/2021 2000-2022 [employer]. All rights reserved. This information is not intended as a substitute for professional medical care. Always follow your healthcare professional's instructions.          Pregnancy: Planning Your Exercise Routine  While you re pregnant, an exercise routine helps both your mind and your body feel good. It tones your muscles and makes them stronger. It also gives you and your baby more oxygen.   The right exercise for you    Overall conditioning is best for you and your baby. Try walking, swimming, or riding a stationary bike. Always warm up, cool down, and drink enough fluids. Keep a snack close by in case your blood sugar gets low. Discuss exercise choices with your healthcare provider. Talk about the following:     If you already exercise, find out how to adapt your routine while you re pregnant. Keep the intensity of the exercise moderate. As your pregnancy progresses, your center of gravity will change. Be careful to keep your balance.    Ask if there are any local prenatal exercise classes, such as yoga or water aerobics. Find out which prenatal exercise videos are good choices.    If you were not exercising before your pregnancy, find out the best way to start. Now is not the time to begin a new workout on your own. Start slowly. Listen to your body.    Ask which forms of exercise you should avoid. These may include risky activities like hot yoga, horseback riding, scuba diving, skiing, skating, and contact sports.  Pelvic tilts  These help strengthen your stomach muscles and low back. You can do pelvic tilts  instead of sit-ups.     Do this exercise on your hands and knees.    Relax the back of your neck. Pull your stomach in until your low back flattens.    Hold for 30 seconds. Release. Repeat 10 times. Do this twice a day.  Kegel exercises  Kegel exercises strengthen the pelvic muscles. Doing Kegels daily helps prepare these muscles for delivery. Kegels also help ease your recovery. You exercise these muscles by tightening, holding, then relaxing them. To do 1 type of Kegel exercise, contract as if you were stopping your urine stream (but do it when you re not urinating). Hold for 10 seconds, then repeat 10 times, a few times a day.   Tips to add activity  Here are some tips to follow:    Park the car farther from a store and walk.    If you can, do errands on foot instead of driving.    Walk across the office to talk to someone in person instead of calling.    While waiting for appointments, go up and down stairs or around the block.  Tips to stay active  Here are some tips to follow:    Maintain your routine. But exercise less intensely if you feel tired.    Base your workout on how you feel, not your heart rate. Heart rates aren t a good way to measure effort during pregnancy.    Don't exercise on your back after week 16.  What are the warning signs that I should stop exercising?  Stop exercising and call your healthcare provider if you have any of these symptoms:    Vaginal bleeding     Dizziness or feeling faint     Increased shortness of breath     Chest pain     Headache     Muscle weakness     Calf (back of the leg) pain or swelling      Uterine contractions or  labor     Decreased fetal movement     Fluid leaking (or gushing) from your vagina  [employer] last reviewed this educational content on 2021-2022 [employer]. All rights reserved. This information is not intended as a substitute for professional medical care. Always follow your healthcare professional's  instructions.          Nutrition During Pregnancy   Having a healthy baby depends mostly on you. What you eat matters to your baby and your health. During pregnancy, you will likely need about 300 more calories per day than before you became pregnant. Each day, try to eat the number of servings listed here for each food group. In addition, cut down on salt and caffeine. Limit the amount of sweets and high-fat foods you eat. Don t smoke or drink alcohol.     Important: See your healthcare provider as often as requested. If you have any questions, be sure to ask them.   Fruits Vegetables Grains & Cereals* Fats & Oils   2 cups  Examples of 1-cup servings:   1 medium apple  1 medium orange  1 medium banana  1 cup chopped fruit  1 cup 100% fruit juice (pasteurized)   1/2 cup dried fruit 2-1/2 to 3 cups   Examples of 1 servin cups raw, leafy greens   1 cup raw or cooked cut-up vegetables   1 cup 100% vegetable juice (pasteurized)  6 to 8 ounces  Examples of 1-ounce servings:   1 slice bread  1/2 cup cooked rice  1/2 cup cooked cereal   1/2 cup pasta  1 ounce cold cereal 6 to 8 teaspoons   Dairy** Protein--- Fluids      3 cups  Examples of 1-cup servings:   1 cup milk  1 cup yogurt  1-1/2 ounces natural cheese   2 ounces processed cheese  5 to 6-1/2 ounces  Examples of 1-ounce servings:   1 egg  1 ounce of lean meat, poultry, or fish   1/4 cup cooked beans   1 tablespoon peanut butter   1/2 ounce nuts 8 or more 8-ounce glasses   Examples:  Water  Mineral water  Clear soups, broth      *Note: Choose whole grains whenever possible.   ** Note: Try to choose low-fat options; stay away from soft cheeses and unpasteurized milk.   --- Notes: Don't eat raw or undercooked meats, eggs, seafood, fish, and shellfish. Also, some types of fish, such as shark, swordfish, and adelaida mackerel, should not be eaten during pregnancy. Don't eat hot dogs, lunch meats, or cold cuts unless heated to steaming just before being served. Ask your  healthcare provider about safe choices.   Prenatal supplements  A prenatal supplement is a pill that you take daily during pregnancy. It helps make sure you re getting the right amount of certain nutrients that are important to your baby. Ask your healthcare provider to help you choose the best one for you. Important nutrients during pregnancy include:     Folic acid. It's best to start taking this supplement 1 month before you start trying to get pregnant. Folic acid helps prevent certain problems in your baby. During pregnancy, you need to take 400 micrograms (mcg) of folic acid every day for the first 2 to 3 months after conception. After that, 600 mcg is needed for a growing baby and placenta.    Iron, calcium, and vitamin D. You may also be advised to take these supplements during pregnancy. They help keep you and your baby healthy. Take them at different times because calcium makes it hard for the body to absorb iron. Taking iron with orange juice helps to increase its absorption.  [employer] last reviewed this educational content on 8/1/2020 2000-2022 [employer]. All rights reserved. This information is not intended as a substitute for professional medical care. Always follow your healthcare professional's instructions.          Pregnancy: Your Weight  Being a healthy weight is important for both you and your baby. The weight you gain now is not just extra fat. It is also the weight of your baby. And it is the increased blood and fluids to support the baby. A slow, steady rate of gain is best. How much you should gain depends on your weight before getting pregnant. Check with your healthcare provider to find out what is right for you.      Your weight will be checked regularly by your healthcare provider.     Talk to your healthcare provider if you have any questions.   If you gain too much  Gaining too much weight might cause you to feel tired, or you could have a harder pregnancy or birth.  If you and your healthcare provider decide you re gaining too much:     Eat fewer fats and sugars. Instead, eat fruit, vegetables, and whole-grain foods.    Drink plenty of water between meals.    Get at least 20 minutes of light exercise, like walking, each day.    Don t diet. You might not get enough of the nutrients you and your baby need.    Keep a food diary to help you gauge what and how much you are eating.  If you're not gaining enough  If you don t gain enough, your baby could be too small or have health problems. Women tend to gain most of their weight in the second and third trimesters. For now:     Eat many types of foods. Make sure you get enough calcium, protein, and carbohydrates.    Don t skip meals.    Eat healthy snacks.    Pick nutrient-dense, high-calorie healthy food like trail mix or protein shakes.    See a dietitian for help.    Talk to your healthcare provider if you have had an eating disorder or problems with certain foods.  The following are ways to get more calories:    Eat breakfast every day. Peanut butter or a slice of cheese on toast can give you an extra protein boost.    Snack between meals. Yogurt and dried fruits can provide protein, calcium, and minerals.    Try to eat more foods that are high in good fats, like nuts, fatty fish, avocados, and olive oil.    Drink juices made from real fruit that are high in vitamin C or beta-carotene, like grapefruit juice, orange juice, papaya nectar, apricot nectar, and carrot juice.    Don't eat junk food, like foods high in sugar.  [employer] last reviewed this educational content on 7/1/2021 2000-2022 [employer]. All rights reserved. This information is not intended as a substitute for professional medical care. Always follow your healthcare professional's instructions.        You have been provided the CDC Warning Signs in Pregnancy document.    Additional copies can be found here: [email or web address]

## 2023-06-20 NOTE — NURSING NOTE
"Initial /73 (BP Location: Right arm, Patient Position: Sitting, Cuff Size: Adult Large)   Pulse 83   Temp 98.5  F (36.9  C) (Tympanic)   Resp 16   Ht 1.549 m (5' 1\")   Wt 90.4 kg (199 lb 6.4 oz)   LMP 03/27/2023   BMI 37.68 kg/m   Estimated body mass index is 37.68 kg/m  as calculated from the following:    Height as of this encounter: 1.549 m (5' 1\").    Weight as of this encounter: 90.4 kg (199 lb 6.4 oz). .      "

## 2023-06-20 NOTE — PROGRESS NOTES
"[facility]    Return OB Note    CC: Return OB     Subjective:  Asmita is a 33 year old  at 11w2d   Denies vaginal bleeding, loss of fluid, or pelvic cramping. No fetal movement yet.  Complaints today: Has been feeling sick and tired. Very nauseous and vomiting some. Down 2 pounds since last visit. Using Zofran sparingly, thought she could only take occasionally.    Objective:  /73 (BP Location: Right arm, Patient Position: Sitting, Cuff Size: Adult Large)   Pulse 83   Temp 98.5  F (36.9  C) (Tympanic)   Resp 16   Ht 1.549 m (5' 1\")   Wt 90.4 kg (199 lb 6.4 oz)   LMP 2023   BMI 37.68 kg/m      FHT: 158bpm    Assessment/Plan:   Encounter Diagnosis   Name Primary?     Prenatal care, subsequent pregnancy in first trimester Yes       IUP at 11w2d  -Nausea: discussed increased usage of Zofran as needed    -NOB labs normal  -Prenatal screening: declines  -History of C section: likely would recommend repeat C section due to failure to descend and subsequent failed TOLAC. Would need delivery at [facility] if she desires TOLAC. Will continue to review options and fetal size at time of delivery.   -Obesity BMI 38: growth US at 32 weeks, BPPs at 37 wks        RTC 4 weeks    Melodie Sesay DO    "

## 2023-07-20 ENCOUNTER — PRENATAL OFFICE VISIT (OUTPATIENT)
Dept: OBGYN | Facility: CLINIC | Age: 33
End: 2023-07-20
Payer: COMMERCIAL

## 2023-07-20 ENCOUNTER — NURSE TRIAGE (OUTPATIENT)
Dept: NURSING | Facility: CLINIC | Age: 33
End: 2023-07-20

## 2023-07-20 VITALS
WEIGHT: 202.4 LBS | SYSTOLIC BLOOD PRESSURE: 116 MMHG | HEIGHT: 61 IN | RESPIRATION RATE: 14 BRPM | TEMPERATURE: 99 F | BODY MASS INDEX: 38.21 KG/M2 | DIASTOLIC BLOOD PRESSURE: 68 MMHG | HEART RATE: 88 BPM

## 2023-07-20 DIAGNOSIS — K21.9 GASTROESOPHAGEAL REFLUX DISEASE WITHOUT ESOPHAGITIS: ICD-10-CM

## 2023-07-20 DIAGNOSIS — E66.812 CLASS 2 OBESITY WITHOUT SERIOUS COMORBIDITY IN ADULT, UNSPECIFIED BMI, UNSPECIFIED OBESITY TYPE: ICD-10-CM

## 2023-07-20 DIAGNOSIS — Z98.891 HISTORY OF CESAREAN SECTION: ICD-10-CM

## 2023-07-20 DIAGNOSIS — Z34.82 PRENATAL CARE, SUBSEQUENT PREGNANCY IN SECOND TRIMESTER: Primary | ICD-10-CM

## 2023-07-20 PROCEDURE — 99207 PR PRENATAL VISIT: CPT | Performed by: OBSTETRICS & GYNECOLOGY

## 2023-07-20 RX ORDER — OMEPRAZOLE 40 MG/1
40 CAPSULE, DELAYED RELEASE ORAL DAILY
Qty: 90 CAPSULE | Refills: 3 | Status: SHIPPED | OUTPATIENT
Start: 2023-07-20 | End: 2024-09-24

## 2023-07-20 RX ORDER — OMEPRAZOLE 40 MG/1
40 CAPSULE, DELAYED RELEASE ORAL DAILY
Qty: 90 CAPSULE | Refills: 3 | Status: SHIPPED | OUTPATIENT
Start: 2023-07-20 | End: 2023-07-20

## 2023-07-20 NOTE — TELEPHONE ENCOUNTER
Roman and wife calling concerned new Rx not at their pharmacy. Spoke with pharmacy and the are having computer issues and request Omeprazole RX to be resent to [facility].  Emma Schmidt RN on 7/20/2023 at 5:56 PM      Reason for Disposition    [1] Prescription prescribed recently is not at pharmacy AND [2] triager has access to patient's EMR AND [3] prescription is recorded in the EMR    Protocols used: MEDICATION QUESTION CALL-A-AH

## 2023-07-20 NOTE — NURSING NOTE
"Initial /68 (BP Location: Left arm, Patient Position: Sitting, Cuff Size: Adult Regular)   Pulse 88   Temp 99  F (37.2  C) (Tympanic)   Resp 14   Ht 1.549 m (5' 1\")   Wt 91.8 kg (202 lb 6.4 oz)   LMP 03/27/2023   BMI 38.24 kg/m   Estimated body mass index is 38.24 kg/m  as calculated from the following:    Height as of this encounter: 1.549 m (5' 1\").    Weight as of this encounter: 91.8 kg (202 lb 6.4 oz). .    "

## 2023-07-20 NOTE — PROGRESS NOTES
"[facility]    Return OB Note    CC: Return OB     Subjective:  Asmita is a 33 year old  at 15w4d   Denies vaginal bleeding, loss of fluid, or pelvic cramping. Starting to feel some fetal movement.  Complaints today: Still having some nausea and vomiting. Zofran caused a lot of constipation and she stopped taking. Vomiting seems to come after coughing. Also seems to have more acid reflux.    Objective:  /68 (BP Location: Left arm, Patient Position: Sitting, Cuff Size: Adult Regular)   Pulse 88   Temp 99  F (37.2  C) (Tympanic)   Resp 14   Ht 1.549 m (5' 1\")   Wt 91.8 kg (202 lb 6.4 oz)   LMP 2023   BMI 38.24 kg/m      FHT: 145bpm    Assessment/Plan:   Encounter Diagnoses   Name Primary?    Prenatal care, subsequent pregnancy in second trimester Yes    History of  section     Class 2 obesity without serious comorbidity in adult, unspecified BMI, unspecified obesity type     Gastroesophageal reflux disease without esophagitis        IUP at 15w4d  -Nausea and vomiting symptoms seem to be associated with GERD, also causing coughing. Will try Omprazole.  -NOB labs normal  -Prenatal screening: declines  -Anatomy US ordered today, they would like to do this at [facility]  -History of C section: likely would recommend repeat C section due to failure to descend and subsequent failed TOLAC. Would need delivery at [facility] if she desires TOLAC. Will continue to review options and fetal size at time of delivery.   -Obesity BMI 38: growth US at 32 weeks, BPPs at 37 wks      RTC 4 weeks    Melodie Sesay DO    "

## 2023-07-20 NOTE — PATIENT INSTRUCTIONS
"Weeks 14 to 18 of Your Pregnancy: Care Instructions  Around this time, you may start to look pregnant. Your baby is now able to pass urine. And the first stool (meconium) is starting to collect in your baby's intestines. Hair is starting to grow on your baby's head.    You may notice some skin changes, such as itchy spots on your palms or acne on your face.   At your next doctor visit, you may have an ultrasound. So you might think about whether you want to know the sex of your baby. Also ask your doctor about flu and COVID-19 shots.      How to reduce stress   Ask for help when you need it.  Try to avoid things that cause you stress.  Seek out things that relieve stress, such as breathing exercises or yoga.     How to get exercise   If you don't usually exercise, start slowly. Short walks may be a good choice.  Try to be active 30 minutes a day, at least 5 days a week.  Avoid activities where you're more likely to fall.  Use light weights to reduce stress on your joints.     How to stay at a healthy weight for you   Talk to your doctor or midwife about how much weight you should gain.  It's generally best to gain:  About 28 to 40 pounds if you're underweight.  About 25 to 35 pounds if you're at a healthy weight.  About 15 to 25 pounds if you're overweight.  About 11 to 20 pounds if you're very overweight (obese).  Follow-up care is a key part of your treatment and safety. Be sure to make and go to all appointments, and call your doctor if you are having problems. It's also a good idea to know your test results and keep a list of the medicines you take.  Where can you learn more?  Go to https://www.[employer].net/patiented  Enter I453 in the search box to learn more about \"Weeks 14 to 18 of Your Pregnancy: Care Instructions.\"  Current as of: November 9, 2022               Content Version: 13.7    4243-6093 [employer].   Care instructions adapted under license by your healthcare professional. If you " have questions about a medical condition or this instruction, always ask your healthcare professional. [employer], [facility] disclaims any warranty or liability for your use of this information.      Second-Trimester Fetal Ultrasound: About This Test  What is it?     Fetal ultrasound is a test that uses sound waves to make pictures of your baby (fetus) and placenta inside the uterus. The test is the safest way to find out the age, size, and position of your baby. You also may be able to find out the sex of your baby. (But the test isn't done just to find out a baby's sex.)  No known risks to the mother or the baby are linked to fetal ultrasound. But you may feel anxious if the test reveals a problem with your pregnancy or baby.  Why is this test done?  In the second trimester, a fetal ultrasound is done to:  Estimate the number of weeks and days a fetus has developed since the beginning of the pregnancy. This is called the gestational age.  Look at the size and position of the fetus, the placenta, and the fluid that surrounds the fetus.  Find major birth defects, such as heart problems or problems with the brain and spinal cord (neural tube defects). But the test may not be able to find many minor defects and some major birth defects.  How do you prepare for the test?  In general, there's nothing you have to do before this test, unless your doctor tells you to.  How is the test done?  You may be able to leave your clothes on, or you will be given a gown to wear.  You will lie on your back on a padded examination table.  A gel will be spread on your belly. It will be removed after the test.  A small, handheld device called a transducer will be pressed against the gel on your skin and moved across your belly several times.  You may watch the monitor to see the picture of your baby during the test.  What happens after the test?  You will probably be able to go home right away.  You most likely will be able to go  "back to your usual activities right away.  Follow-up care is a key part of your treatment and safety. Be sure to make and go to all appointments, and call your doctor if you are having problems. It's also a good idea to keep a list of the medicines you take. Ask your doctor when you can expect to have your test results.  Where can you learn more?  Go to https://www.Mir[employer].Soysuper/patiented  Enter Y671 in the search box to learn more about \"Second-Trimester Fetal Ultrasound: About This Test.\"  Current as of: November 9, 2022               Content Version: 13.7    7794-7946 [employer].   Care instructions adapted under license by your healthcare professional. If you have questions about a medical condition or this instruction, always ask your healthcare professional. [employer] disclaims any warranty or liability for your use of this information.      During Pregnancy: Exercises  Introduction  Here are some examples of exercises to do during your pregnancy. Start each exercise slowly. Ease off the exercise if you start to have pain.  Your doctor or physical therapist will tell you when you can start these exercises and which ones will work best for you.  How to do the exercises  Neck rotation    Sit in a firm chair, or stand tall. If you're standing, keep your feet about hip-width apart.  Keeping your chin level, turn your head to the right and hold for 15 to 30 seconds.  Turn your head to the left and hold for 15 to 30 seconds.  Repeat 2 to 4 times.  Next stretch to the front    Sit in a firm chair, or stand tall. Look straight ahead. If you're standing, keep your feet about hip-width apart.  Slowly bend your head forward without moving your shoulders.  Hold for 15 to 30 seconds, then return to your starting position.  Repeat 2 to 4 times.  Back press    Place your feet 10 to 12 inches from the wall.  Rest your back flat against the wall and slide down the wall until your knees are " slightly bent.  Press your lower back against the wall by pulling in your stomach muscles.  Hold for 6 seconds, and then relax your stomach muscles and slide back up the wall.  Repeat 8 to 12 times.  Trunk twist (seated)    Sit on the floor with your legs crossed. If that's not comfortable, you can sit on a folded blanket so your bottom is a few inches off the floor. Or you can sit on a chair with your knees comfortably spread apart and your feet flat on the floor.  Reach your left hand toward your right knee. You can place your right hand at your side for support.  Slowly twist your trunk (upper body) to your right.  Relax and return to your starting position.  Repeat 2 to 4 times.  Switch your hands and twist to your left.  Repeat 2 to 4 times.  Pelvic rocking    Kneeling on hands and knees, place your hands directly under your shoulders and your knees under your hips.  Breathe in deeply. Tuck your head downward and round your back up, making a curve with your back in the shape of the letter C. Hold this position for a count of 6.  Breathe out slowly and bring your head back up. Relax, keeping your back straight (don't allow it to curve toward the floor). Hold this for a count of 6.  Do this exercise 8 times or to your comfort level.  Pelvic tilt    This exercise strengthens your lower back and pelvis. It is for use during the first 4 months of pregnancy. After this point, lying on your back is not recommended, because it can cause blood flow problems for you and your baby.  Lie on your back.  Keep your knees relaxed.  Tighten your belly and buttocks muscles.  At the same time, gently shift your pelvis upward. This should flatten the curve in your back.  Hold for 6 seconds and then relax.  Gradually increase the number of tilts you do each day, to your comfort level.  Backward stretch    Kneel on hands and knees with your knees 8 to 10 inches apart, hands directly under your shoulders, and arms and back  straight.  Keeping your arms straight, slowly lower your buttocks toward your heels and tuck your head toward your knees. Hold for 15 to 30 seconds.  Slowly return to the kneeling position.  Repeat 2 to 4 times.  Forward bend    Sit comfortably in a chair, with your arms relaxed.  Slowly bend forward, allowing your arms to hang down in front of you. Lean only as far as you can without feeling discomfort or pressure on your belly.  Hold for 15 to 30 seconds and then slowly sit up straight.  Repeat 2 to 4 times or to your comfort level.  Leg lift crawl    Kneeling on hands and knees, place your hands directly under your shoulders and straighten your arms.  Tighten your belly muscles by pulling in your belly button toward your spine. Be sure you continue to breathe normally and do not hold your breath.  Lift your left knee and bring it toward your elbow.  Slowly extend your leg behind you without completely straightening it. Be careful not to let your hip drop down. Avoid arching your back.  Hold your leg behind you for about 6 seconds.  Return to your starting position.  Do the same exercise with your other leg.  Repeat 8 to 12 times for each leg.  Tailor sitting    Sit on the floor.  Bring your feet close to your body while crossing your ankles.  Hold this position for as long as you are comfortable.  Tailor stretching    Sit on the floor with your back straight, legs about 12 inches apart, and feet relaxed outward.  Stretch your hands forward toward your left foot, then sit up.  Stretch your hands straight forward, then sit up.  Stretch your hands forward toward your right foot, then sit up.  Hold each stretch for 15 to 30 seconds.  Repeat 2 to 4 times.  Follow-up care is a key part of your treatment and safety. Be sure to make and go to all appointments, and call your doctor if you are having problems. It's also a good idea to know your test results and keep a list of the medicines you take.  Current as of:  November 9, 2022               Content Version: 13.7    2987-4318 [employer].   Care instructions adapted under license by your healthcare professional. If you have questions about a medical condition or this instruction, always ask your healthcare professional. [employer] disclaims any warranty or liability for your use of this information.        Pregnancy: Your Second Trimester Changes  Each day, you and your baby are changing and growing together. Here s a quick look at what s happening to both of you.  How you are changing  Even when you don t notice it, your body is adapting to meet the needs of your growing baby. The changes in your body might also affect your moods.  Your body  Your uterus expands as your baby grows. As the weeks go by, you will feel more pressure on your bladder, stomach, and other organs. You may notice some skin color changes on your forehead, nose, or cheeks. Freckles may darken, and moles may grow. You may notice a darker line on your abdomen between your belly button and pubic bone in the midline.  Your moods  The second trimester is often easier than the first. Still, be prepared for mood swings. These are from the increase in hormones made by your body. Hormones are chemicals that affect the way organs work. These mood swings are a normal part of pregnancy.  How your baby is growing    Month 4  Your baby s heartbeat may be heard with a Doppler (handheld ultrasound device) by 9 to 10 weeks. Eyebrows, eyelashes, and fingernails begin to form.    Month 5  You may feel your baby move. After a growth spurt, your baby nears 10 inches.    Month 6  Your baby s fingerprints have formed. Your baby weighs about 1 to 2 pounds and is about 12 inches long.    [employer] last reviewed this educational content on 7/1/2021 2000-2023 [employer]. All rights reserved. This information is not intended as a substitute for professional medical care. Always follow  your healthcare professional's instructions.          Adapting to Pregnancy: Second Trimester  Keep up the healthy habits you started in your first trimester. You might be a little more tired than normal. So plan your day wisely. Look at the tips below and choose the ones that suit your lifestyle.   Note  If you have any questions, talk with your healthcare provider.   If you work  If you can, adjust your work with your employer to fit your needs. Try these tips:   If you stand for long periods, find ways to do some tasks while sitting. Also, try to stand with 1 foot resting on a low stool or ledge. Shift your weight from foot to foot often. Wear low-heeled shoes.  If you sit, keep your knees level with your hips. Rest your feet on a firm surface. Sit tall with support for your low back.  If you work long hours, ask about adjusting your schedule. Try taking shorter breaks more often.  When you travel  The second trimester may be the best time for any travel. Talk to your healthcare provider about any special plans you may need to make. Always:   Wear a seat belt. Fasten the lap part under your belly. Wear the shoulder part also.  Take breaks often during long trips by car or plane. Move around to stretch your legs.  Drink plenty of fluids on flights. The air in plane cabins is very dry.  Stay out of hot climates or high altitudes if you are not used to them.  Stay away from places where the food and water might make you sick.  Make sure you are up-to-date on all vaccines, including the flu vaccine. This is especially important when traveling overseas.  Taking time to relax  Find time to rest and relax at work or at home:   Take short time-outs daily. Do relaxation exercises.  Breathe deeply during stressful times.  Try not to take on too much. Plan tasks for times when you have the most energy.  Take naps when you can. Or just sit and relax.  After week 16, don't lie on your back for more than a few minutes.  Instead, lie on your side. Switch sides often.    Having sex  Unless your healthcare provider tells you otherwise, there is no reason to stop having sex now. Blood supply increases to the pelvic area in the second trimester. Because of this, sex might be more enjoyable. Try different positions and see what s best. Also talk with your partner about any changes in desire. Spotting may happen after sex. Let your healthcare provider know if there is heavy bleeding.   Keeping your environment safe  You can still clean your house and use scented products. Just take some simple precautions:   Wear gloves when using cleaning fluids.  Open windows to let in fresh air. Use a fan if you paint.  Stay away from secondhand smoke.  Don t breathe fumes from nail polish, hair spray, cleansers, or other chemicals.  How daily issues affect your health  Many things in your daily life impact your health. This can include transportation, money problems, housing, access to food, and . If you can t get to medical appointments, you may not receive the care you need. When money is tight, it may be difficult to pay for medicines. And living far from a grocery store can make it hard to buy healthy food.   If you have concerns in any of these or other areas, talk with your healthcare team. They may know of local resources to assist you. Or they may have a staff person who can help.   [employer] last reviewed this educational content on 6/1/2021 2000-2023 [employer]. All rights reserved. This information is not intended as a substitute for professional medical care. Always follow your healthcare professional's instructions.

## 2023-07-24 ENCOUNTER — APPOINTMENT (OUTPATIENT)
Dept: INTERPRETER SERVICES | Facility: CLINIC | Age: 33
End: 2023-07-24
Payer: COMMERCIAL

## 2023-08-14 ENCOUNTER — PRENATAL OFFICE VISIT (OUTPATIENT)
Dept: OBGYN | Facility: CLINIC | Age: 33
End: 2023-08-14
Payer: COMMERCIAL

## 2023-08-14 VITALS
DIASTOLIC BLOOD PRESSURE: 76 MMHG | HEART RATE: 104 BPM | SYSTOLIC BLOOD PRESSURE: 138 MMHG | WEIGHT: 207.4 LBS | BODY MASS INDEX: 39.16 KG/M2 | HEIGHT: 61 IN | RESPIRATION RATE: 16 BRPM | TEMPERATURE: 97.8 F

## 2023-08-14 DIAGNOSIS — Z98.891 HISTORY OF CESAREAN SECTION: ICD-10-CM

## 2023-08-14 DIAGNOSIS — O09.522 MULTIGRAVIDA OF ADVANCED MATERNAL AGE IN SECOND TRIMESTER: ICD-10-CM

## 2023-08-14 DIAGNOSIS — Z34.82 PRENATAL CARE, SUBSEQUENT PREGNANCY IN SECOND TRIMESTER: Primary | ICD-10-CM

## 2023-08-14 DIAGNOSIS — E66.812 CLASS 2 OBESITY WITHOUT SERIOUS COMORBIDITY WITH BODY MASS INDEX (BMI) OF 38.0 TO 38.9 IN ADULT, UNSPECIFIED OBESITY TYPE: ICD-10-CM

## 2023-08-14 PROCEDURE — 99207 PR PRENATAL VISIT: CPT | Performed by: OBSTETRICS & GYNECOLOGY

## 2023-08-14 NOTE — PATIENT INSTRUCTIONS
"Weeks 18 to 22 of Your Pregnancy: Care Instructions  At this stage you may find that your nausea and fatigue are gone. You may feel better overall and have more energy. But you might now also have some new discomforts, like sleep problems or leg cramps.    You may start to feel your baby move. These movements can feel like butterflies or bubbles.   Babies at this stage can now suck their thumbs.     Get some exercise every day.  And avoid caffeine late in the day.     Take a warm shower or bath before bed.  Try relaxation exercises to calm your mind and body.     Use extra pillows.  They can help you get comfortable.     Don't use sleeping pills or alcohol.  They could harm your baby.     For leg cramps, stretch and apply heat.  A warm bath, leg warmers, a heating pad, or a hot water bottle can help with muscle aches.   Stretches for leg cramps    Straighten your leg and bend your foot (flex your ankle) slowly upward, toward your knee. Bend your toes up and down.   Stand on a flat surface. Stretch your toes upward. For balance, hold on to the wall or something stable. If it feels okay, take small steps walking on your heels.   Follow-up care is a key part of your treatment and safety. Be sure to make and go to all appointments, and call your doctor if you are having problems. It's also a good idea to know your test results and keep a list of the medicines you take.  Where can you learn more?  Go to https://www.[employer].net/patiented  Enter W603 in the search box to learn more about \"Weeks 18 to 22 of Your Pregnancy: Care Instructions.\"  Current as of: November 9, 2022               Content Version: 13.7    3052-9313 [employer].   Care instructions adapted under license by your healthcare professional. If you have questions about a medical condition or this instruction, always ask your healthcare professional. [employer] disclaims any warranty or liability for your use of this " information.      Round Ligament Pain: Care Instructions  Your Care Instructions     Round ligament pain is a common pain during pregnancy. You may feel a sharp brief pain on one or both sides of your belly. It may go down into your groin. It's usually felt for the first time during the second trimester.  This pain is a normal part of pregnancy. It will go away as your pregnancy continues or after your baby is born.  Your uterus is supported by two ligaments that go from the top and sides of the uterus to the bones of the pelvis. These are the round ligaments. As your uterus grows, these ligaments stretch and tighten with your movements. This may be the cause of the pain. You may find that certain activities seem to cause pain. If you can, avoid those activities.  Your doctor can usually diagnose round ligament pain from your symptoms and an exam. If you have bleeding or other symptoms, your doctor may also do an imaging test, such as an ultrasound. Your doctor may suggest that you take an over-the-counter pain medicine, such as acetaminophen.  Follow-up care is a key part of your treatment and safety. Be sure to make and go to all appointments, and call your doctor if you are having problems. It's also a good idea to know your test results and keep a list of the medicines you take.  How can you care for yourself at home?  If certain movements seem to trigger belly pain, see if you can avoid them while you are pregnant.  Stay active. If your doctor says it's okay, try moderate exercise. Water exercise may be a good choice if you have belly pain. Examples are swimming and water aerobics.  Ask your doctor about taking acetaminophen for pain. Be safe with medicines. Read and follow all instructions on the label.  When should you call for help?   Call your doctor now or seek immediate medical care if:    You think you might be in labor.     You have new or worse pain.   Watch closely for changes in your health, and be  "sure to contact your doctor if you have any problems.  Where can you learn more?  Go to https://www.[employer].net/patiented  Enter R110 in the search box to learn more about \"Round Ligament Pain: Care Instructions.\"  Current as of: November 9, 2022               Content Version: 13.7    5333-0022 [employer].   Care instructions adapted under license by your healthcare professional. If you have questions about a medical condition or this instruction, always ask your healthcare professional. [employer] disclaims any warranty or liability for your use of this information.      Leg and Ankle Edema: Care Instructions  Your Care Instructions  Swelling in the legs, ankles, and feet is called edema. It is common after you sit or stand for a while. Long plane flights or car rides often cause swelling in the legs and feet. You may also have swelling if you have to stand for long periods of time at your job. Problems with the veins in the legs (varicose veins) and changes in hormones can also cause swelling. Sometimes the swelling in the ankles and feet is caused by a more serious problem, such as heart failure, infection, blood clots, or liver or kidney disease.  Follow-up care is a key part of your treatment and safety. Be sure to make and go to all appointments, and call your doctor if you are having problems. It's also a good idea to know your test results and keep a list of the medicines you take.  How can you care for yourself at home?  If your doctor gave you medicine, take it as prescribed. Call your doctor if you think you are having a problem with your medicine.  Whenever you are resting, raise your legs up. Try to keep the swollen area higher than the level of your heart.  Take breaks from standing or sitting in one position.  Walk around to increase the blood flow in your lower legs.  Move your feet and ankles often while you stand, or tighten and relax your leg muscles.  Wear support " "stockings. Put them on in the morning, before swelling gets worse.  Eat a balanced diet. Lose weight if you need to.  Limit the amount of salt (sodium) in your diet. Salt holds fluid in the body and may increase swelling.  When should you call for help?   Call 911 anytime you think you may need emergency care. For example, call if:    You have symptoms of a blood clot in your lung (called a pulmonary embolism). These may include:  Sudden chest pain.  Trouble breathing.  Coughing up blood.   Call your doctor now or seek immediate medical care if:    You have signs of a blood clot, such as:  Pain in your calf, back of the knee, thigh, or groin.  Redness and swelling in your leg or groin.     You have symptoms of infection, such as:  Increased pain, swelling, warmth, or redness.  Red streaks or pus.  A fever.   Watch closely for changes in your health, and be sure to contact your doctor if:    Your swelling is getting worse.     You have new or worsening pain in your legs.     You do not get better as expected.   Where can you learn more?  Go to https://www.[employer].net/patiented  Enter N696 in the search box to learn more about \"Leg and Ankle Edema: Care Instructions.\"  Current as of: November 14, 2022               Content Version: 13.7    8008-6070 [employer].   Care instructions adapted under license by your healthcare professional. If you have questions about a medical condition or this instruction, always ask your healthcare professional. [employer] disclaims any warranty or liability for your use of this information.      Back Pain During Pregnancy: Care Instructions  Overview     Back pain has many possible causes. It is often caused by problems with muscles and ligaments in your back. The extra weight during pregnancy can put stress on your back. Moving, lifting, standing, sitting, or sleeping in an awkward way also can strain your back. Back pain can also be a sign of labor. " "Although it may hurt a lot, back pain often improves on its own. Use good home treatment, and take care not to stress your back.  Follow-up care is a key part of your treatment and safety. Be sure to make and go to all appointments, and call your doctor if you are having problems. It's also a good idea to know your test results and keep a list of the medicines you take.  How can you care for yourself at home?  Ask your doctor about taking acetaminophen (Tylenol) for pain. Do not take aspirin, ibuprofen (Advil, Motrin), or naproxen (Aleve).  Do not take two or more pain medicines at the same time unless the doctor told you to. Many pain medicines have acetaminophen, which is Tylenol. Too much acetaminophen (Tylenol) can be harmful.  Lie on your side with your knees and hips bent and a pillow between your legs. This reduces stress on your back.  Put ice or cold packs on your back for 10 to 20 minutes at a time, several times a day. Put a thin cloth between the ice and your skin.  Warm baths may also help reduce pain.  Change positions every 30 minutes. Take breaks if you must sit for a long time. Get up and walk around.  Ask your doctor about how much exercise you can do. You may feel better taking short walks or doing gentle movements and stretching in a swimming pool.  Ask your doctor about exercises to stretch and strengthen your back.  When should you call for help?   Call your doctor now or seek immediate medical care if:    You think you are in labor.     You have new numbness in your buttocks, genital or rectal areas, or legs.     You have a new loss of bowel or bladder control.   Watch closely for changes in your health, and be sure to contact your doctor if:    You do not get better as expected.   Where can you learn more?  Go to https://www.healthwise.net/patiented  Enter C696 in the search box to learn more about \"Back Pain During Pregnancy: Care Instructions.\"  Current as of: November 9, "                Content Version: 13.7    4629-3564 [employer].   Care instructions adapted under license by your healthcare professional. If you have questions about a medical condition or this instruction, always ask your healthcare professional. [employer] disclaims any warranty or liability for your use of this information.       Labor: Care Instructions  Overview      labor is the start of labor between 20 and 36 weeks of pregnancy. Most babies are born at 37 to 42 weeks of pregnancy. In labor, the uterus contracts to open the cervix. This is the first stage of childbirth.  labor can be caused by a problem with the baby, the mother, or both. Often the cause is not known.  In some cases, doctors use medicines to try to delay labor until 34 or more weeks of pregnancy. By this time, a baby has grown enough so that problems are not likely. In some cases--such as with a serious infection--it is healthier for the baby to be born early. Your treatment will depend on how far along you are in your pregnancy and on your health and your baby's health.  Follow-up care is a key part of your treatment and safety. Be sure to make and go to all appointments, and call your doctor if you are having problems. It's also a good idea to know your test results and keep a list of the medicines you take.  How can you care for yourself at home?  If your doctor prescribed medicines, take them exactly as directed. Call your doctor if you think you are having a problem with your medicine.  Rest until your doctor advises you about activity.  Do not have sexual intercourse unless your doctor says it is safe.  Use sanitary pads if you have vaginal bleeding. Using pads makes it easier to monitor your bleeding.  Do not smoke or allow others to smoke around you. If you need help quitting, talk to your doctor about stop-smoking programs and medicines. These can increase your chances of quitting  "for good.  When should you call for help?   Call 911  anytime you think you may need emergency care. For example, call if:    You passed out (lost consciousness).     You have a seizure.     You have severe vaginal bleeding.     You have severe pain in your belly or pelvis that doesn't get better between contractions.     You have had fluid gushing or leaking from your vagina and you know or think the umbilical cord is bulging into your vagina. If this happens, immediately get down on your knees so your rear end (buttocks) is higher than your head. This will decrease the pressure on the cord until help arrives.   Call your doctor now or seek immediate medical care if:    You have signs of preeclampsia, such as:  Sudden swelling of your face, hands, or feet.  New vision problems (such as dimness, blurring, or seeing spots).  A severe headache.     You have any vaginal bleeding.     You have belly pain or cramping.     You have a fever.     You have had regular contractions (with or without pain) for an hour. This means that you have 6 or more within 1 hour after you change your position and drink fluids.     You have a sudden release of fluid from the vagina.     You have low back pain or pelvic pressure that does not go away.     You notice that your baby has stopped moving or is moving much less than normal.   Watch closely for changes in your health, and be sure to contact your doctor if you have any problems.  Where can you learn more?  Go to https://www.[employer].net/patiented  Enter Q400 in the search box to learn more about \" Labor: Care Instructions.\"  Current as of: 2022               Content Version: 13.7    5587-8106 [employer].   Care instructions adapted under license by your healthcare professional. If you have questions about a medical condition or this instruction, always ask your healthcare professional. [employer] disclaims any warranty or liability " for your use of this information.

## 2023-08-14 NOTE — PROGRESS NOTES
"[facility]    Return OB Note    CC: Return OB     Subjective:  Asmita is a 33 year old  at 19w1d   Denies vaginal bleeding, loss of fluid, or pelvic cramping. Good fetal movement.  Complaints today:   GERD improved with omeprazole   Leg cramps improved with Magnesium supplement  Getting more uncomfortable to sleep.    Objective:  /76 (BP Location: Left arm, Patient Position: Sitting, Cuff Size: Adult Regular)   Pulse 104   Temp 97.8  F (36.6  C)   Resp 16   Ht 1.549 m (5' 1\")   Wt 94.1 kg (207 lb 6.4 oz)   LMP 2023   BMI 39.19 kg/m      FHT: 145bpm    Assessment/Plan:   Encounter Diagnoses   Name Primary?    Prenatal care, subsequent pregnancy in second trimester Yes    History of  section     Class 2 obesity without serious comorbidity with body mass index (BMI) of 38.0 to 38.9 in adult, unspecified obesity type     Multigravida of advanced maternal age in second trimester        IUP at 19w1d  -Nausea and vomiting symptoms seem to be associated with GERD, also causing coughing. Will try Omprazole.  -NOB labs normal  -Prenatal screening: declines  -Anatomy US is scheduled, wanting to do this at [facility]  -AMA: declines L2 and genetics   -History of C section: likely would recommend repeat C section due to failure to descend and subsequent failed TOLAC. Would need delivery at [facility] if she desires TOLAC. Will continue to review options and fetal size at time of delivery.   -Obesity BMI 38: growth US at 32 weeks, BPPs at 37 wks      RTC 4 weeks    Melodie Sesay DO    "

## 2023-08-18 ENCOUNTER — HOSPITAL ENCOUNTER (OUTPATIENT)
Dept: ULTRASOUND IMAGING | Facility: CLINIC | Age: 33
Discharge: HOME | End: 2023-08-18
Attending: OBSTETRICS & GYNECOLOGY | Admitting: OBSTETRICS & GYNECOLOGY
Payer: COMMERCIAL

## 2023-08-18 DIAGNOSIS — Z34.82 PRENATAL CARE, SUBSEQUENT PREGNANCY IN SECOND TRIMESTER: ICD-10-CM

## 2023-08-18 PROCEDURE — 76805 OB US >/= 14 WKS SNGL FETUS: CPT

## 2023-08-22 ENCOUNTER — APPOINTMENT (OUTPATIENT)
Dept: INTERPRETER SERVICES | Facility: CLINIC | Age: 33
End: 2023-08-22
Payer: COMMERCIAL

## 2023-08-22 NOTE — RESULT ENCOUNTER NOTE
Pt notified of below.  Pt reports understanding.  Pt does not have further questions or concerns.    Ana Dias   [facility]  RN

## 2023-09-14 ENCOUNTER — PRENATAL OFFICE VISIT (OUTPATIENT)
Dept: OBGYN | Facility: CLINIC | Age: 33
End: 2023-09-14
Payer: COMMERCIAL

## 2023-09-14 VITALS
HEIGHT: 61 IN | RESPIRATION RATE: 14 BRPM | BODY MASS INDEX: 39.35 KG/M2 | DIASTOLIC BLOOD PRESSURE: 69 MMHG | SYSTOLIC BLOOD PRESSURE: 122 MMHG | WEIGHT: 208.4 LBS | TEMPERATURE: 98 F | HEART RATE: 113 BPM

## 2023-09-14 DIAGNOSIS — Z34.82 PRENATAL CARE, SUBSEQUENT PREGNANCY IN SECOND TRIMESTER: Primary | ICD-10-CM

## 2023-09-14 DIAGNOSIS — O09.522 MULTIGRAVIDA OF ADVANCED MATERNAL AGE IN SECOND TRIMESTER: ICD-10-CM

## 2023-09-14 DIAGNOSIS — E66.812 CLASS 2 OBESITY WITHOUT SERIOUS COMORBIDITY WITH BODY MASS INDEX (BMI) OF 38.0 TO 38.9 IN ADULT, UNSPECIFIED OBESITY TYPE: ICD-10-CM

## 2023-09-14 DIAGNOSIS — Z98.891 HISTORY OF CESAREAN SECTION: ICD-10-CM

## 2023-09-14 PROCEDURE — 99207 PR PRENATAL VISIT: CPT | Performed by: OBSTETRICS & GYNECOLOGY

## 2023-09-14 NOTE — NURSING NOTE
"Initial /69 (BP Location: Right arm, Patient Position: Sitting, Cuff Size: Adult Large)   Pulse 113   Temp 98  F (36.7  C) (Tympanic)   Resp 14   Ht 1.549 m (5' 1\")   Wt 94.5 kg (208 lb 6.4 oz)   LMP 03/27/2023   BMI 39.38 kg/m   Estimated body mass index is 39.38 kg/m  as calculated from the following:    Height as of this encounter: 1.549 m (5' 1\").    Weight as of this encounter: 94.5 kg (208 lb 6.4 oz). .    "

## 2023-09-14 NOTE — PROGRESS NOTES
"[facility]    Return OB Note    CC: Return OB     Subjective:  Asmita is a 33 year old  at 23w4d   Denies vaginal bleeding, loss of fluid, or regular contractions. Good fetal movement.  Complaints today: None    Objective:  /69 (BP Location: Right arm, Patient Position: Sitting, Cuff Size: Adult Large)   Pulse 113   Temp 98  F (36.7  C) (Tympanic)   Resp 14   Ht 1.549 m (5' 1\")   Wt 94.5 kg (208 lb 6.4 oz)   LMP 2023   BMI 39.38 kg/m      Fundal height: 27cm  FHT: 150bpm      Assessment/Plan:   Encounter Diagnoses   Name Primary?    Class 2 obesity without serious comorbidity with body mass index (BMI) of 38.0 to 38.9 in adult, unspecified obesity type     Prenatal care, subsequent pregnancy in second trimester Yes    History of  section     Multigravida of advanced maternal age in second trimester        IUP at 23w4d  -NOB labs normal  -Prenatal screening: declines  -Anatomy US normal (declined L2)  -AMA: declines L2 and genetics   -History of C section: likely would recommend repeat C section due to failure to descend and subsequent failed TOLAC. Would need delivery at [facility] if she desires TOLAC. Will continue to review options and fetal size at time of delivery.   -Obesity BMI 38, also S>D: growth US at 32 weeks, BPPs at 37 wks  -Strict return precautions given    RTC 4 weeks    Melodie Sesay DO    "

## 2023-10-11 NOTE — PATIENT INSTRUCTIONS
"Weeks 22 to 26 of Your Pregnancy: Care Instructions  Your baby's lungs are getting ready for breathing. Your baby may respond to your voice. Your baby likely turns less, and kicks or jerks more. Jerking may mean that your baby has hiccups.    Think about taking childbirth classes. And start to think about whether you want to have pain medicine during labor.   At your next doctor visit, you may be tested for anemia and for high blood sugar that first occurs during pregnancy (gestational diabetes). These conditions can cause problems for you and your baby.     To ease discomfort, such as back pain    Change your position often. Try not to sit or stand for too long.  Get some exercise. Things like walking or stretching may help.  Try using a heating pad or cold pack.    To ease or reduce swelling in your feet, ankles, hands, and fingers    Take off your rings.  Avoid high-sodium foods, such as potato chips.  Prop up your feet, and sleep with pillows under your feet.  Try to avoid standing for long periods of time.  Do not wear tight shoes.  Wear support stockings.  Kegel exercises to prevent urine from leaking    Squeeze your muscles as if you were trying not to pass gas. Your belly, legs, and buttocks shouldn't move. Hold the squeeze for 3 seconds, then relax for 5 to 10 seconds.    Add 1 second each week until you can squeeze for 10 seconds. Repeat the exercise 10 times a session. Do 3 to 8 sessions a day. If these exercises cause you pain, stop doing them and talk with your doctor.  Follow-up care is a key part of your treatment and safety. Be sure to make and go to all appointments, and call your doctor if you are having problems. It's also a good idea to know your test results and keep a list of the medicines you take.  Where can you learn more?  Go to https://www.healthwise.net/patiented  Enter G264 in the search box to learn more about \"Weeks 22 to 26 of Your Pregnancy: Care Instructions.\"  Current as of: " November 9, 2022               Content Version: 13.7    9619-1753 [employer].   Care instructions adapted under license by your healthcare professional. If you have questions about a medical condition or this instruction, always ask your healthcare professional. [employer] disclaims any warranty or liability for your use of this information.      Learning About Screening for Gestational Diabetes  What is gestational diabetes screening?     Screening for gestational diabetes is a way to look for high blood sugar during pregnancy. You drink some very sweet liquid. Then you have a blood test to see how your body uses sugar (glucose).  How is gestational diabetes screening done?  Screening for gestational diabetes may be done in a couple of ways.  Two-part screening.  Part one (glucose challenge test): A blood sample is taken after you drink a liquid that contains sugar (glucose). You don't need to stop eating or drinking before this test. If the test shows that you don't have a lot of sugar in your blood, you don't have gestational diabetes.  Part two (oral glucose tolerance test, or OGTT): If the first test shows a lot of sugar in your blood, then you may have an OGTT. You can't eat or drink for at least 8 hours before this test. A blood sample is taken, then you drink a sweet liquid. You have more blood tests after 1 to 3 hours. If the OGTT shows that you have a lot of sugar in your blood, you may have gestational diabetes.  One-part screening.  Sometimes doctors use the OGTT on its own. If the test shows that you don't have a lot of sugar in your blood, you don't have gestational diabetes. If you do have a lot of sugar in your blood, you may have the condition.  What are the risks of screening?  Your blood glucose level may drop very low toward the end of the test. If this happens, you may feel weak, hungry, and restless. Tell your doctor if you have these symptoms. The test usually will  "be stopped.  You may vomit after drinking the sweet liquid. If this happens, you may need to take the test at a later time.  Your doctor may do more glucose tests at other times during your pregnancy.  Follow-up care is a key part of your treatment and safety. Be sure to make and go to all appointments, and call your doctor if you are having problems. It's also a good idea to know your test results and keep a list of the medicines you take.  Where can you learn more?  Go to https://www.[employer].net/patiented  Enter A472 in the search box to learn more about \"Learning About Screening for Gestational Diabetes.\"  Current as of: 2023               Content Version: 13.7    9018-5327 [employer].   Care instructions adapted under license by your healthcare professional. If you have questions about a medical condition or this instruction, always ask your healthcare professional. [employer] disclaims any warranty or liability for your use of this information.      You have been provided the My Labor and Birth Wishes document.  Please review at home and bring to your next prenatal visit. Bring this sheet to the hospital for your birth. Give copies to your care team members and support person.   Additional copies can be found here:  [email or web address]  Weeks 26 to 30 of Your Pregnancy: Care Instructions  You're starting your last trimester. You'll probably feel your baby moving around more. Your back may ache as your body gets used to your baby's size and length. Take care of yourself, and pay attention to what your body needs.    Talk to your doctor about getting the Tdap shot. It will help protect your  against whooping cough (pertussis). Also ask your doctor about flu and COVID-19 shots if you haven't had them yet. If your blood type is Rh negative, you may be given a shot of Rh immune globulin (such as RhoGAM). It can help prevent problems for your baby.   You may have " "[place] contractions. They are single or several strong contractions without a pattern. These are practice contractions but not the start of labor.   Be kind to yourself.     Take breaks when you're tired.  Change positions often. Don't sit for too long or stand for too long.  At work, rest during breaks if you can. If you don't get breaks, talk to your doctor about writing a letter to your employer to request them.  Avoid fumes, chemicals, and tobacco smoke.  Be sexual if you want to.     You may be interested in sex, or you may not. Everyone is different.  Sex is okay unless your doctor tells you not to.  Your belly can make it hard to find good positions for sex. Antler and explore.  Watch for signs of  labor.    These signs include:   Menstrual-like cramps. Or you may have pain or pressure in your pelvis that happens in a pattern.  About 6 or more contractions in an hour (even after rest and a glass of water).  A low, dull backache that doesn't go away when you change positions.  An increase or change in vaginal discharge.  Light vaginal bleeding or spotting.  Your water breaking.  Know what to do if you think you are having contractions.     Drink 1 or 2 glasses of water.  Lie down on your left side for at least an hour.  While on your side, feel the top of your belly to see if it's tight.  Write down your contractions for an hour. Time how long it is from the start of one contraction to the start of the next.  Call your doctor if you have regular contractions.  Follow-up care is a key part of your treatment and safety. Be sure to make and go to all appointments, and call your doctor if you are having problems. It's also a good idea to know your test results and keep a list of the medicines you take.  Where can you learn more?  Go to https://www.healthwise.net/patiented  Enter S999 in the search box to learn more about \"Weeks 26 to 30 of Your Pregnancy: Care Instructions.\"  Current as of: " November 9, 2022               Content Version: 13.7    5698-4283 [employer].   Care instructions adapted under license by your healthcare professional. If you have questions about a medical condition or this instruction, always ask your healthcare professional. [employer] disclaims any warranty or liability for your use of this information.      Counting Your Baby's Kicks: Care Instructions  Overview     Counting your baby's kicks is one way your doctor can tell that your baby is healthy. You will probably feel your baby move for the first time between 16 and 22 weeks. The movement may feel like flutters rather than kicks. Your baby may move more at certain times of the day. When you are active, you may notice less kicking than when you are resting. At your prenatal visits, your doctor will ask whether the baby is active.  In your last trimester, your doctor may ask you to count the number of times you feel your baby move.  Follow-up care is a key part of your treatment and safety. Be sure to make and go to all appointments, and call your doctor if you are having problems. It's also a good idea to know your test results and keep a list of the medicines you take.  How do you count fetal kicks?  A common method of checking your baby's movement is to note the length of time it takes to count 10 movements (such as kicks, flutters, or rolls).  Pick your baby's most active time of day to count. This may be any time from morning to evening.  If you don't feel 10 movements in an hour, have something to eat or drink and count for another hour. If you don't feel at least 10 movements in the 2-hour period, call your doctor.  Do not use an at-home Doppler heart monitor in place of counting fetal movements.  When should you call for help?   Call your doctor now or seek immediate medical care if:    You feel fewer than 10 movements in a 2-hour period.     You noticed that your baby has stopped moving  "or is moving less than normal.   Watch closely for changes in your health, and be sure to contact your doctor if you have any problems.  Where can you learn more?  Go to https://www.[employer].net/patiented  Enter U048 in the search box to learn more about \"Counting Your Baby's Kicks: Care Instructions.\"  Current as of: November 9, 2022               Content Version: 13.7    3563-2793 [employer].   Care instructions adapted under license by your healthcare professional. If you have questions about a medical condition or this instruction, always ask your healthcare professional. [employer] disclaims any warranty or liability for your use of this information.      Using Nitrous Oxide During Labor  What is nitrous oxide?  Nitrous oxide is a mixture of 50% nitrous oxide gas and 50% oxygen that is inhaled through a mask. Nitrous oxide is better known for use in dental offices or before surgery to help patients relax. Most people know of it as \"laughing gas.\"   How do I use it during labor?  You hold your own mask and begin to inhale the gas mixture about 30 seconds before a contraction begins. Breathing before and during a contraction helps the gas work the best right at the peak of the contraction, providing the greatest relief. It may take 3 to 4 contractions to get used to the rhythm of breathing the nitrous oxide.   Does nitrous oxide have any side effects?  Some women have reported dizziness, feeling sleepy, dry mouth and nausea (feeling sick to your stomach) with use of nitrous oxide. These side effects often decrease over time. Medicine is available to help ease nausea if it is a concern for you.   Is any extra monitoring required for me or my baby?  No. Your monitoring will not need to change just because you are using nitrous oxide.   Can I still be out of bed and use nitrous oxide?  Yes. Women sometimes feel dizzy when using nitrous oxide. For this reason, you will begin using " nitrous oxide while in a bed or chair. If you feel okay, you may get up and walk or use a birthing ball or stool. It will be important that you have someone in the room close by for support.   Can I use nitrous oxide and have IV pain medicines at the same time?  No. The combination of narcotics (injectable pain medications) and nitrous oxide can slow your breathing, so it is not safe for them to be used together. You may use nitrous oxide before receiving an epidural or IV pain medication.  If I use nitrous oxide can I still get an epidural?  Yes. You may wish to use nitrous oxide until you are ready for an epidural. Nitrous oxide can be less effective than an epidural in reducing labor pain. If an epidural is part of your birth plan, it is important that you get your epidural while you are still able to sit for safe placement.   Are there reasons I couldn't use nitrous oxide?   Yes. You cannot use nitrous oxide if you:  Cannot hold your own face mask.  Have received a dose of narcotic in the past few hours (your nurse will discuss this with you).  Have a documented B12 deficiency.  Have one of a very few other rare medical conditions.  Can my labor support person help me with my nitrous oxide?  No! Only you can administer nitrous oxide to yourself. Part of the built-in safety of nitrous oxide is that you hold your own mask. If anyone in the room is found to be handling the nitrous oxide equipment other than you or your nurse, the nitrous oxide will be removed.  Can I use nitrous oxide with other procedures?   Yes. If nitrous oxide is right for you and your provider agrees, it may be used in these situations:  During the repair of a laceration or episiotomy  Manual removal of your placenta  Blood draws  IV placement  For informational purposes only. Not to replace the advice of your health care provider. Copyright   2019 [employer]. All rights reserved. Clinically reviewed by  System  Operations Leadership [facility] Team. [employer] [id number] - Rev 01/20.

## 2023-10-13 ENCOUNTER — PRENATAL OFFICE VISIT (OUTPATIENT)
Dept: OBGYN | Facility: CLINIC | Age: 33
End: 2023-10-13
Payer: COMMERCIAL

## 2023-10-13 VITALS
DIASTOLIC BLOOD PRESSURE: 65 MMHG | SYSTOLIC BLOOD PRESSURE: 121 MMHG | RESPIRATION RATE: 16 BRPM | TEMPERATURE: 97.1 F | HEART RATE: 101 BPM | HEIGHT: 61 IN | BODY MASS INDEX: 39.8 KG/M2 | WEIGHT: 210.8 LBS

## 2023-10-13 DIAGNOSIS — Z23 NEED FOR PROPHYLACTIC VACCINATION AND INOCULATION AGAINST INFLUENZA: ICD-10-CM

## 2023-10-13 DIAGNOSIS — Z98.891 HISTORY OF CESAREAN SECTION: ICD-10-CM

## 2023-10-13 DIAGNOSIS — E66.812 CLASS 2 OBESITY WITHOUT SERIOUS COMORBIDITY WITH BODY MASS INDEX (BMI) OF 38.0 TO 38.9 IN ADULT, UNSPECIFIED OBESITY TYPE: ICD-10-CM

## 2023-10-13 DIAGNOSIS — Z34.82 PRENATAL CARE, SUBSEQUENT PREGNANCY IN SECOND TRIMESTER: Primary | ICD-10-CM

## 2023-10-13 DIAGNOSIS — O09.522 MULTIGRAVIDA OF ADVANCED MATERNAL AGE IN SECOND TRIMESTER: ICD-10-CM

## 2023-10-13 DIAGNOSIS — O99.013 ANEMIA AFFECTING PREGNANCY IN THIRD TRIMESTER: ICD-10-CM

## 2023-10-13 LAB
ERYTHROCYTE [DISTWIDTH] IN BLOOD BY AUTOMATED COUNT: 13.6 % (ref 10–15)
GLUCOSE 1H P 50 G GLC PO SERPL-MCNC: 162 MG/DL (ref 70–129)
HCT VFR BLD AUTO: 31.1 % (ref 35–47)
HGB BLD-MCNC: 10.1 G/DL (ref 11.7–15.7)
MCH RBC QN AUTO: 31 PG (ref 26.5–33)
MCHC RBC AUTO-ENTMCNC: 32.5 G/DL (ref 31.5–36.5)
MCV RBC AUTO: 95 FL (ref 78–100)
PLATELET # BLD AUTO: 281 10E3/UL (ref 150–450)
RBC # BLD AUTO: 3.26 10E6/UL (ref 3.8–5.2)
WBC # BLD AUTO: 7.6 10E3/UL (ref 4–11)

## 2023-10-13 PROCEDURE — 36415 COLL VENOUS BLD VENIPUNCTURE: CPT | Performed by: OBSTETRICS & GYNECOLOGY

## 2023-10-13 PROCEDURE — 90686 IIV4 VACC NO PRSV 0.5 ML IM: CPT | Performed by: OBSTETRICS & GYNECOLOGY

## 2023-10-13 PROCEDURE — 83550 IRON BINDING TEST: CPT | Performed by: OBSTETRICS & GYNECOLOGY

## 2023-10-13 PROCEDURE — 86780 TREPONEMA PALLIDUM: CPT | Performed by: OBSTETRICS & GYNECOLOGY

## 2023-10-13 PROCEDURE — 90471 IMMUNIZATION ADMIN: CPT | Performed by: OBSTETRICS & GYNECOLOGY

## 2023-10-13 PROCEDURE — 82728 ASSAY OF FERRITIN: CPT | Performed by: OBSTETRICS & GYNECOLOGY

## 2023-10-13 PROCEDURE — 82950 GLUCOSE TEST: CPT | Performed by: OBSTETRICS & GYNECOLOGY

## 2023-10-13 PROCEDURE — 83540 ASSAY OF IRON: CPT | Performed by: OBSTETRICS & GYNECOLOGY

## 2023-10-13 PROCEDURE — 85027 COMPLETE CBC AUTOMATED: CPT | Performed by: OBSTETRICS & GYNECOLOGY

## 2023-10-13 PROCEDURE — 99207 PR PRENATAL VISIT: CPT | Performed by: OBSTETRICS & GYNECOLOGY

## 2023-10-13 NOTE — PROGRESS NOTES
"[facility]    Return OB Note    CC: Return OB     Subjective:  Asmita is a 33 year old  at 27w5d   Denies vaginal bleeding, loss of fluid, or regular contractions. Good fetal movement.  Complaints today: None    Objective:  /65 (BP Location: Right arm, Patient Position: Sitting, Cuff Size: Adult Large)   Pulse 101   Temp 97.1  F (36.2  C)   Resp 16   Ht 1.549 m (5' 1\")   Wt 95.6 kg (210 lb 12.8 oz)   LMP 2023   BMI 39.83 kg/m      Fundal height: 30cm  FHT: 145bpm    Assessment/Plan:   Encounter Diagnoses   Name Primary?    Prenatal care, subsequent pregnancy in second trimester Yes    History of  section     Multigravida of advanced maternal age in second trimester     Class 2 obesity without serious comorbidity with body mass index (BMI) of 38.0 to 38.9 in adult, unspecified obesity type     Need for prophylactic vaccination and inoculation against influenza        IUP at 27w5d  -NOB labs normal  -Prenatal screening: declines  -Anatomy US normal (declined L2)  -Mid trimester labs today  -Flu shot today  -AMA: declines L2 and genetics   -History of C section x2: would recommend repeat C section due to failure to descend and subsequent failed TOLAC. Will continue to review options and fetal size at time of delivery.   -Obesity BMI 38, also S>D: growth US at 32 weeks (ordered today), BPPs at 37 wks  -Strict return precautions given    RTC 2 weeks    Melodie Sesay DO    "

## 2023-10-14 LAB — T PALLIDUM AB SER QL: NONREACTIVE

## 2023-10-16 DIAGNOSIS — O99.810 ABNORMAL MATERNAL GLUCOSE TOLERANCE, ANTEPARTUM: Primary | ICD-10-CM

## 2023-10-16 DIAGNOSIS — O99.013 ANEMIA AFFECTING PREGNANCY IN THIRD TRIMESTER: ICD-10-CM

## 2023-10-16 LAB
FERRITIN SERPL-MCNC: 12 NG/ML (ref 6–175)
IRON BINDING CAPACITY (ROCHE): 521 UG/DL (ref 240–430)
IRON SATN MFR SERPL: 17 % (ref 15–46)
IRON SERPL-MCNC: 89 UG/DL (ref 37–145)

## 2023-10-27 ENCOUNTER — LAB (OUTPATIENT)
Dept: LAB | Facility: CLINIC | Age: 33
End: 2023-10-27
Payer: COMMERCIAL

## 2023-10-27 DIAGNOSIS — O99.810 ABNORMAL MATERNAL GLUCOSE TOLERANCE, ANTEPARTUM: ICD-10-CM

## 2023-10-27 DIAGNOSIS — E66.812 CLASS 2 OBESITY WITHOUT SERIOUS COMORBIDITY IN ADULT, UNSPECIFIED BMI, UNSPECIFIED OBESITY TYPE: ICD-10-CM

## 2023-10-27 LAB
GESTATIONAL GTT 1 HR POST DOSE: 182 MG/DL (ref 60–179)
GESTATIONAL GTT 2 HR POST DOSE: 158 MG/DL (ref 60–154)
GESTATIONAL GTT 3 HR POST DOSE: 137 MG/DL (ref 60–139)
GLUCOSE P FAST SERPL-MCNC: 100 MG/DL (ref 60–94)
HBA1C MFR BLD: 5.4 % (ref 0–5.6)

## 2023-10-27 PROCEDURE — 82951 GLUCOSE TOLERANCE TEST (GTT): CPT

## 2023-10-27 PROCEDURE — 36415 COLL VENOUS BLD VENIPUNCTURE: CPT

## 2023-10-27 PROCEDURE — 83036 HEMOGLOBIN GLYCOSYLATED A1C: CPT

## 2023-10-27 PROCEDURE — 82952 GTT-ADDED SAMPLES: CPT

## 2023-11-03 ENCOUNTER — VIRTUAL VISIT (OUTPATIENT)
Dept: OBGYN | Facility: CLINIC | Age: 33
End: 2023-11-03
Payer: COMMERCIAL

## 2023-11-03 VITALS — BODY MASS INDEX: 39.65 KG/M2 | HEIGHT: 61 IN | WEIGHT: 210 LBS

## 2023-11-03 DIAGNOSIS — O24.419 GESTATIONAL DIABETES MELLITUS (GDM) IN THIRD TRIMESTER, GESTATIONAL DIABETES METHOD OF CONTROL UNSPECIFIED: Primary | ICD-10-CM

## 2023-11-03 DIAGNOSIS — Z34.83 PRENATAL CARE, SUBSEQUENT PREGNANCY IN THIRD TRIMESTER: ICD-10-CM

## 2023-11-03 DIAGNOSIS — O21.9 NAUSEA/VOMITING IN PREGNANCY: ICD-10-CM

## 2023-11-03 PROCEDURE — 99442 PR PHYSICIAN TELEPHONE EVALUATION 11-20 MIN: CPT | Performed by: OBSTETRICS & GYNECOLOGY

## 2023-11-03 RX ORDER — METOCLOPRAMIDE 5 MG/1
5 TABLET ORAL 3 TIMES DAILY PRN
Qty: 60 TABLET | Refills: 2 | Status: ON HOLD | OUTPATIENT
Start: 2023-11-03 | End: 2023-12-23

## 2023-11-03 NOTE — PROGRESS NOTES
Asmita is a 33 year old who is being evaluated via a billable telephone visit.      What phone number would you like to be contacted at? [phone]    How would you like to obtain your AVS? Leilani    Distant Location (provider location):  On-site    Phone call duration: 20  minutes  Start time: 09:39  End time: 09:59    Assessment & Plan   Problem List Items Addressed This Visit          Endocrine    Gestational diabetes mellitus (GDM) in third trimester, gestational diabetes method of control unspecified - Primary    Relevant Orders    AMB Adult Diabetes Educator Referral    US OB >14 Weeks Follow Up       Other    Prenatal care, subsequent pregnancy     Other Visit Diagnoses       Nausea/vomiting in pregnancy        Relevant Medications    metoclopramide (REGLAN) 5 MG tablet           Discussed results from 3-hour glucose screening showing gestational diabetes.  Discussed diagnosis as well as management plan.  Referral for diabetic educator placed.  Explained first steps of diabetic diet education as well as checking blood sugars.  Discussed indication for starting medication if sugars not optimally controlled with diabetic diet.  Will continue to monitor fetal growth, has growth ultrasound already scheduled for next week.  Ordered follow-up ultrasound for 4 weeks.  Plan to initiate BPP's if medications are needed.    Patient also complaining of persistent feeling of increased stomach acid leading to nausea and vomiting with eating.  She is taking omeprazole, reiterated proper dosage timing.  She had constipation issues with Zofran in early pregnancy.  Will try Reglan with meals as needed.  Rx sent.      DO RUMA D[place] [facility] [place]    Dick Tian is a 33 year old, presenting for the following health issues:  Follow Up  Results review    HPI   Patient presents         Objective           Vitals:  No vitals were obtained today due to virtual visit.    Physical Exam    healthy, alert, and no distress  PSYCH: Alert and oriented times 3; coherent speech, normal   rate and volume, able to articulate logical thoughts, Her affect is normal  RESP: No cough, no audible wheezing, able to talk in full sentences  Remainder of exam unable to be completed due to telephone visits

## 2023-11-06 ENCOUNTER — VIRTUAL VISIT (OUTPATIENT)
Dept: EDUCATION SERVICES | Facility: CLINIC | Age: 33
End: 2023-11-06
Attending: OBSTETRICS & GYNECOLOGY
Payer: COMMERCIAL

## 2023-11-06 DIAGNOSIS — O24.419 GESTATIONAL DIABETES MELLITUS (GDM) IN THIRD TRIMESTER, GESTATIONAL DIABETES METHOD OF CONTROL UNSPECIFIED: ICD-10-CM

## 2023-11-06 PROCEDURE — G0108 DIAB MANAGE TRN  PER INDIV: HCPCS | Performed by: DIETITIAN, REGISTERED

## 2023-11-06 RX ORDER — LANCETS
EACH MISCELLANEOUS
Qty: 200 EACH | Refills: 1 | Status: SHIPPED | OUTPATIENT
Start: 2023-11-06 | End: 2024-09-24

## 2023-11-06 NOTE — PATIENT INSTRUCTIONS
Test glucose 4 times per day:   Fasting (when you first awake for the day): 95 mg/dL or below   1 hour after breakfast: 140 mg/dL or below   1 hour after lunch: 140 mg/dL or below   1 hour after dinner: 140 mg/dL or below     Please bring your meter and log book to all appointments     If you miss 1 hour after meal test, test 2 hours after the meal.  Goal 2 hours after is 120 mg/dL or below.     2.  Check your urine ketones once a day, when you first awake for the day until they are negative to trace for 7 days in a row.  Then decrease and check once a week.     3.  Meal Plan    Breakfast (8 AM): 1-2 slices whole grain bread + protein (eggs, cheese, sausage, etc)    Snack (10-11 AM): individual container of Greek yogurt (try Chobani Less Sugar)      Lunch (1 PM): 1-2 cups soup (make sure the soup has protein) + small amount fruit (1/2 banana, 15 grapes, 15 cherries, OR baseball size apple)    Snack (3-4 PM): portion of fruit (1/2 banana, 15 grapes, 15 cherries, baseball size apple) + nuts     Dinner (6 PM): same as lunch   OR   pork (or other protein) + tomatoes or lettuce + 1 cup cooked rice + beans  OR  Pork (or other protein) + tomatoes or lettuce + 1-2 cups soup     Snack (9 PM):  2 Belvita bars (1/2 package) + nuts    Remember to consume some carbohydrate every 2-3 hours while awake    4.  Aim for 20-30 minutes of activity most days of the week (with the okay of your OB provider).      5.  Follow up: Monday, November 13th at 9:30 AM.    6.  Call Diabetes Education at [phone] or send a [employer] message with:   -questions or concerns   -ketones that are small, moderate, or large   -3 or more blood sugars above target in a 7 day period    Thank you,     Marisol Strange, MPH, RD, CDCES, LD 11/6/2023

## 2023-11-06 NOTE — PROGRESS NOTES
Diabetes Self-Management Education & Support  Type of Service: Telephone Visit    Originating Location (Patient Location): Home  Distant Location (Provider Location): [place] - [facility]  Mode of Communication:  Telephone    Telephone Visit Start Time:  11:11 AM  Telephone Visit End Time (telephone visit stop time): 12:44 PM  [facility]  used for the duration of the visit     How would patient like to obtain AVS? Mail a copy (reports  does read English).    SUBJECTIVE/OBJECTIVE:  Presents for education related to gestational diabetes.    Accompanied by: Self,   Diabetes management related comments/concerns: glucose monitoring and meal planning  Gestational weeks: 31w1d  Hospital planned for delivery: [facility] OB Visit Date: 11/07/23  Number of previous pregnancies: 2  Had any babies over 9 lbs: No  Previously had Gestational Diabetes: No  Have you ever had thyroid problems or taken thyroid medication?: No  Hypertension : No  High Cholesterol: No  High Triglycerides: No  Do you use tobacco products?: No  Do you drink beer, wine or hard liquor?: No    Cultural Influences/Ethnic Background:   or     Estimated Date of Delivery: Jan 7, 2024    1 hour OGTT  Lab Results   Component Value Date    GLU1 162 (H) 10/13/2023         3 hour OGTT    Fasting  Lab Results   Component Value Date    GTTGF 100 (H) 10/27/2023       1 hour  Lab Results   Component Value Date    GTTG1 182 (H) 10/27/2023       2 hour  Lab Results   Component Value Date    GTTG2 158 (H) 10/27/2023       3 hour  Lab Results   Component Value Date    GTTG3 137 10/27/2023       Lifestyle and Health Behaviors:  Exercise:: Yes (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)  Cultural/Sabianist diet restrictions?: No  Meal planning/habits: None  How many times a week on average do you eat food made away from home (restaurant/take-out)?: 1  Breakfast: 8 AM: smoothie (Banana, raw egg, milk (sometimes  chocolate milk OR Nesquick)) OR sandwich (interiano, white bread, turkey), water  Lunch: 1 PM: soup (tomatoes, spices, chicken OR potatoes OR carrots), chicken broth, fruit, OR pozole (corn, chicken, vegetables)  Dinner: 4-6 PM: lettuce with chicken, fried pork with tomatoes and onions  Snacks: 11 AM egg OR sandwich;  Other: usually doesn't eat after 5 pm due to vomiting; vomiting seems worse with milk and cheese  Beverages: Water  Pre-lanette vitamin?: Yes  Supplements?: No  Experiencing nausea?: No  Experiencing heartburn?: Yes (causes vomiting (2-3 times/day))    Healthy Coping:   Emotional response to diabetes: ready to learn    Current Management:  Taking medications for gestational diabetes?: No  Difficulty affording diabetes testing supplies?:  (n/a)    ASSESSMENT:  Discussed importance of good blood glucose control for health of mom and baby.  Introduced tools for managing gestational diabetes including blood glucose monitoring, urine ketone testing, and meal planning.  Prescriptions for testing supplies were electronically sent to patient's pharmacy.  Discussed when to test blood sugars and blood sugar goals.  Discussed basics of glucose monitoring.  Discussed resources for learning her specific glucometer (ask pharmacist, watch online  video, call number on back of meter, [facility]). Instructed patient on urine ketone monitoring and goals for urine ketones.  Patient declined to learn carbohydrate counting.  Discussed importance of having a meal or snack every 2-3 hours while awake.  Provided patient recommended meal plan based on preferred and tolerated foods. Patient verbalized understanding of concepts discussed and recommendations provided.    INTERVENTION:  Educational topics covered today:  GDM diagnosis, pathophysiology, Risks and Complications of GDM, Means of controlling GDM, Using a Blood Glucose Monitor, Blood Glucose Goals, Logging and Interpreting Glucose Results, Ketone Testing,  When to Call a Diabetes Educator or OB Provider, Healthy Eating During Pregnancy, and Meal Planning for GDM    Educational materials mailed:   GDM log book     Pt verbalized understanding of concepts discussed and recommendations provided today.     PLAN:  Test glucose 4 times per day:   Fasting (when you first awake for the day): 95 mg/dL or below   1 hour after breakfast: 140 mg/dL or below   1 hour after lunch: 140 mg/dL or below   1 hour after dinner: 140 mg/dL or below     Please bring your meter and log book to all appointments     If you miss 1 hour after meal test, test 2 hours after the meal.  Goal 2 hours after is 120 mg/dL or below.     2.  Check your urine ketones once a day, when you first awake for the day until they are negative to trace for 7 days in a row.  Then decrease and check once a week.     3.  Meal Plan    Breakfast (8 AM): 1-2 slices whole grain bread + protein (eggs, cheese, sausage, etc)    Snack (10-11 AM): individual container of Greek yogurt (try Chobani Less Sugar)    Lunch (1 PM): 1-2 cups soup (make sure the soup has protein) + small amount fruit (1/2 banana, 15 grapes, 15 cherries, OR baseball size apple)    Snack (3-4 PM): portion of fruit (1/2 banana, 15 grapes, 15 cherries, baseball size apple) + nuts     Dinner (6 PM): same as lunch   OR   pork (or other protein) + tomatoes or lettuce + 1 cup cooked rice + beans  OR  Pork (or other protein) + tomatoes or lettuce + 1-2 cups soup     Snack (9 PM):  2 Belvita bars (1/2 package) + nuts    Remember to consume some carbohydrate every 2-3 hours while awake    4.  Aim for 20-30 minutes of activity most days of the week (with the okay of your OB provider).      5.  Follow up: Monday, November 13th at 9:30 AM.    6.  Call Diabetes Education at [phone] or send a [place] message with:   -questions or concerns   -ketones that are small, moderate, or large   -3 or more blood sugars above target in a 7 day period    Marisol Strange, MPH,  JENNIFER WREN LD 11/6/2023    Time Spent: 93 minutes  Encounter Type: Individual    Any diabetes medication dose changes were made via the CDE Protocol and Collaborative Practice Agreement with the patient's referring provider. A copy of this encounter was shared with the provider.

## 2023-11-06 NOTE — LETTER
11/6/2023         RE: Asmita Frye  [id number] [facility] [place] [address]        Dear Colleague,    Thank you for referring your patient, Asmita Frye, to the [facility]. Please see a copy of my visit note below.    Diabetes Self-Management Education & Support  Type of Service: Telephone Visit    Originating Location (Patient Location): Home  Distant Location (Provider Location): Home - [facility]  Mode of Communication:  Telephone    Telephone Visit Start Time:  11:11 AM  Telephone Visit End Time (telephone visit stop time): 12:44 PM  [facility]  used for the duration of the visit     How would patient like to obtain AVS? Mail a copy (reports  does read English).    SUBJECTIVE/OBJECTIVE:  Presents for education related to gestational diabetes.    Accompanied by: Self,   Diabetes management related comments/concerns: glucose monitoring and meal planning  Gestational weeks: 31w1d  Hospital planned for delivery: [facility]  Next OB Visit Date: 11/07/23  Number of previous pregnancies: 2  Had any babies over 9 lbs: No  Previously had Gestational Diabetes: No  Have you ever had thyroid problems or taken thyroid medication?: No  Hypertension : No  High Cholesterol: No  High Triglycerides: No  Do you use tobacco products?: No  Do you drink beer, wine or hard liquor?: No    Cultural Influences/Ethnic Background:   or     Estimated Date of Delivery: Jan 7, 2024    1 hour OGTT  Lab Results   Component Value Date    GLU1 162 (H) 10/13/2023         3 hour OGTT    Fasting  Lab Results   Component Value Date    GTTGF 100 (H) 10/27/2023       1 hour  Lab Results   Component Value Date    GTTG1 182 (H) 10/27/2023       2 hour  Lab Results   Component Value Date    GTTG2 158 (H) 10/27/2023       3 hour  Lab Results   Component Value Date    GTTG3 137 10/27/2023       Lifestyle and Health Behaviors:  Exercise:: Yes (walk 10 minutes  BID on school days)  Cultural/Yazdanism diet restrictions?: No  Meal planning/habits: None  How many times a week on average do you eat food made away from home (restaurant/take-out)?: 1  Breakfast: 8 AM: smoothie (Banana, raw egg, milk (sometimes chocolate milk OR Nesquick)) OR sandwich (interiano, white bread, turkey), water  Lunch: 1 PM: soup (tomatoes, spices, chicken OR potatoes OR carrots), chicken broth, fruit, OR pozole (corn, chicken, vegetables)  Dinner: 4-6 PM: lettuce with chicken, fried pork with tomatoes and onions  Snacks: 11 AM egg OR sandwich;  Other: usually doesn't eat after 5 pm due to vomiting; vomiting seems worse with milk and cheese  Beverages: Water  Pre- vitamin?: Yes  Supplements?: No  Experiencing nausea?: No  Experiencing heartburn?: Yes (causes vomiting (2-3 times/day))    Healthy Coping:   Emotional response to diabetes: ready to learn    Current Management:  Taking medications for gestational diabetes?: No  Difficulty affording diabetes testing supplies?:  (n/a)    ASSESSMENT:  Discussed importance of good blood glucose control for health of mom and baby.  Introduced tools for managing gestational diabetes including blood glucose monitoring, urine ketone testing, and meal planning.  Prescriptions for testing supplies were electronically sent to patient's pharmacy.  Discussed when to test blood sugars and blood sugar goals.  Discussed basics of glucose monitoring.  Discussed resources for learning her specific glucometer (ask pharmacist, watch online  video, call number on back of meter, [facility]). Instructed patient on urine ketone monitoring and goals for urine ketones.  Patient declined to learn carbohydrate counting.  Discussed importance of having a meal or snack every 2-3 hours while awake.  Provided patient recommended meal plan based on preferred and tolerated foods. Patient verbalized understanding of concepts discussed and recommendations  provided.    INTERVENTION:  Educational topics covered today:  GDM diagnosis, pathophysiology, Risks and Complications of GDM, Means of controlling GDM, Using a Blood Glucose Monitor, Blood Glucose Goals, Logging and Interpreting Glucose Results, Ketone Testing, When to Call a Diabetes Educator or OB Provider, Healthy Eating During Pregnancy, and Meal Planning for GDM    Educational materials mailed:   GDM log book     Pt verbalized understanding of concepts discussed and recommendations provided today.     PLAN:  Test glucose 4 times per day:   Fasting (when you first awake for the day): 95 mg/dL or below   1 hour after breakfast: 140 mg/dL or below   1 hour after lunch: 140 mg/dL or below   1 hour after dinner: 140 mg/dL or below     Please bring your meter and log book to all appointments     If you miss 1 hour after meal test, test 2 hours after the meal.  Goal 2 hours after is 120 mg/dL or below.     2.  Check your urine ketones once a day, when you first awake for the day until they are negative to trace for 7 days in a row.  Then decrease and check once a week.     3.  Meal Plan    Breakfast (8 AM): 1-2 slices whole grain bread + protein (eggs, cheese, sausage, etc)    Snack (10-11 AM): individual container of Greek yogurt (try Chobani Less Sugar)    Lunch (1 PM): 1-2 cups soup (make sure the soup has protein) + small amount fruit (1/2 banana, 15 grapes, 15 cherries, OR baseball size apple)    Snack (3-4 PM): portion of fruit (1/2 banana, 15 grapes, 15 cherries, baseball size apple) + nuts     Dinner (6 PM): same as lunch   OR   pork (or other protein) + tomatoes or lettuce + 1 cup cooked rice + beans  OR  Pork (or other protein) + tomatoes or lettuce + 1-2 cups soup     Snack (9 PM):  2 Belvita bars (1/2 package) + nuts    Remember to consume some carbohydrate every 2-3 hours while awake    4.  Aim for 20-30 minutes of activity most days of the week (with the okay of your OB provider).      5.  Follow up:  Monday, November 13th at 9:30 AM.    6.  Call Diabetes Education at [phone] or send a [employer] message with:   -questions or concerns   -ketones that are small, moderate, or large   -3 or more blood sugars above target in a 7 day period    Marisol Strange, MPH, RD, [facility], LD 11/6/2023    Time Spent: 93 minutes  Encounter Type: Individual    Any diabetes medication dose changes were made via the CDE Protocol and Collaborative Practice Agreement with the patient's referring provider. A copy of this encounter was shared with the provider.

## 2023-11-07 ENCOUNTER — PRENATAL OFFICE VISIT (OUTPATIENT)
Dept: OBGYN | Facility: CLINIC | Age: 33
End: 2023-11-07
Payer: COMMERCIAL

## 2023-11-07 ENCOUNTER — HOSPITAL ENCOUNTER (OUTPATIENT)
Dept: ULTRASOUND IMAGING | Facility: CLINIC | Age: 33
Discharge: HOME | End: 2023-11-07
Attending: OBSTETRICS & GYNECOLOGY | Admitting: OBSTETRICS & GYNECOLOGY
Payer: COMMERCIAL

## 2023-11-07 VITALS
HEART RATE: 85 BPM | BODY MASS INDEX: 39.38 KG/M2 | DIASTOLIC BLOOD PRESSURE: 69 MMHG | WEIGHT: 208.6 LBS | RESPIRATION RATE: 14 BRPM | SYSTOLIC BLOOD PRESSURE: 113 MMHG | HEIGHT: 61 IN | TEMPERATURE: 96 F

## 2023-11-07 DIAGNOSIS — O09.522 MULTIGRAVIDA OF ADVANCED MATERNAL AGE IN SECOND TRIMESTER: ICD-10-CM

## 2023-11-07 DIAGNOSIS — E66.812 CLASS 2 OBESITY WITHOUT SERIOUS COMORBIDITY WITH BODY MASS INDEX (BMI) OF 38.0 TO 38.9 IN ADULT, UNSPECIFIED OBESITY TYPE: ICD-10-CM

## 2023-11-07 DIAGNOSIS — Z34.83 PRENATAL CARE, SUBSEQUENT PREGNANCY IN THIRD TRIMESTER: Primary | ICD-10-CM

## 2023-11-07 DIAGNOSIS — O24.419 GESTATIONAL DIABETES MELLITUS (GDM) IN THIRD TRIMESTER, GESTATIONAL DIABETES METHOD OF CONTROL UNSPECIFIED: ICD-10-CM

## 2023-11-07 DIAGNOSIS — Z98.891 HISTORY OF CESAREAN SECTION: ICD-10-CM

## 2023-11-07 PROCEDURE — 90715 TDAP VACCINE 7 YRS/> IM: CPT | Performed by: OBSTETRICS & GYNECOLOGY

## 2023-11-07 PROCEDURE — 90471 IMMUNIZATION ADMIN: CPT | Performed by: OBSTETRICS & GYNECOLOGY

## 2023-11-07 PROCEDURE — 76816 OB US FOLLOW-UP PER FETUS: CPT

## 2023-11-07 PROCEDURE — 99207 PR PRENATAL VISIT: CPT | Performed by: OBSTETRICS & GYNECOLOGY

## 2023-11-07 NOTE — PROGRESS NOTES
Prior to immunization administration, verified patients identity using patient s name and date of birth. Please see Immunization Activity for additional information.     Screening Questionnaire for Adult Immunization    Are you sick today?   No   Do you have allergies to medications, food, a vaccine component or latex?   No   Have you ever had a serious reaction after receiving a vaccination?   No   Do you have a long-term health problem with heart, lung, kidney, or metabolic disease (e.g., diabetes), asthma, a blood disorder, no spleen, complement component deficiency, a cochlear implant, or a spinal fluid leak?  Are you on long-term aspirin therapy?   No   Do you have cancer, leukemia, HIV/AIDS, or any other immune system problem?   No   Do you have a parent, brother, or sister with an immune system problem?   No   In the past 3 months, have you taken medications that affect  your immune system, such as prednisone, other steroids, or anticancer drugs; drugs for the treatment of rheumatoid arthritis, Crohn s disease, or psoriasis; or have you had radiation treatments?   No   Have you had a seizure, or a brain or other nervous system problem?   No   During the past year, have you received a transfusion of blood or blood    products, or been given immune (gamma) globulin or antiviral drug?   No   For women: Are you pregnant or is there a chance you could become       pregnant during the next month?   Yes   Have you received any vaccinations in the past 4 weeks?   No           Patient instructed to remain in clinic for 15 minutes afterwards, and to report any adverse reactions.     Screening performed by Don Parrish MA on 11/7/2023 at 4:43 PM.

## 2023-11-07 NOTE — NURSING NOTE
"Initial /69 (BP Location: Right arm, Patient Position: Sitting, Cuff Size: Adult Regular)   Pulse 85   Temp (!) 96  F (35.6  C) (Tympanic)   Resp 14   Ht 1.549 m (5' 1\")   Wt 94.6 kg (208 lb 9.6 oz)   LMP 03/27/2023   BMI 39.41 kg/m   Estimated body mass index is 39.41 kg/m  as calculated from the following:    Height as of this encounter: 1.549 m (5' 1\").    Weight as of this encounter: 94.6 kg (208 lb 9.6 oz). .    "

## 2023-11-07 NOTE — PROGRESS NOTES
"[facility]    Return OB Note    CC: Return OB     Subjective:  Asmita is a 33 year old  at 31w2d   Denies vaginal bleeding, loss of fluid, or regular contractions. Good fetal movement.  Complaints today: None    Objective:  /69 (BP Location: Right arm, Patient Position: Sitting, Cuff Size: Adult Regular)   Pulse 85   Temp (!) 96  F (35.6  C) (Tympanic)   Resp 14   Ht 1.549 m (5' 1\")   Wt 94.6 kg (208 lb 9.6 oz)   LMP 2023   BMI 39.41 kg/m      Fundal height: 35cm  FHT: 135bpm    Assessment/Plan:   Encounter Diagnoses   Name Primary?    Prenatal care, subsequent pregnancy in third trimester Yes    Multigravida of advanced maternal age in second trimester     History of  section     Gestational diabetes mellitus (GDM) in third trimester, gestational diabetes method of control unspecified     Class 2 obesity without serious comorbidity with body mass index (BMI) of 38.0 to 38.9 in adult, unspecified obesity type        IUP at 31w2d  -NOB labs normal  -Prenatal screening: declines  -Anatomy US normal (declined L2)  -Mid trimester labs: GDMA  -S/p Flu shot, Tdap today    Co-Morbidities/Complications/Concerns:   -GDMA1: underwent diabetic education and picking up glucose testing supplies today, reiterates today when she needs to check sugars and ketones. Will follow growth US. US  EFW 89%, AC 95%. History of large babies.  -AMA: declines L2 and genetics   -History of C section x2: would recommend repeat C section due to failure to descend and subsequent failed TOLAC. Will continue to review options and fetal size at time of delivery.   -Obesity BMI 38, also S>D: following US, BPPs at 37 wks  -Strict return precautions given    RTC 2 weeks    Melodie Sesay DO"

## 2023-11-08 ENCOUNTER — TELEPHONE (OUTPATIENT)
Dept: EDUCATION SERVICES | Facility: CLINIC | Age: 33
End: 2023-11-08
Payer: COMMERCIAL

## 2023-11-08 DIAGNOSIS — O24.419 GESTATIONAL DIABETES MELLITUS (GDM) IN THIRD TRIMESTER, GESTATIONAL DIABETES METHOD OF CONTROL UNSPECIFIED: Primary | ICD-10-CM

## 2023-11-08 RX ORDER — BLOOD SUGAR DIAGNOSTIC
STRIP MISCELLANEOUS
Qty: 200 STRIP | Refills: 4 | Status: SHIPPED | OUTPATIENT
Start: 2023-11-08 | End: 2024-09-24

## 2023-11-08 NOTE — TELEPHONE ENCOUNTER
Attempted to call pt again but phone went directly to voicemail. Left another message to call triage line number so we can try and connect.   Marli Garcia RD, LD, [facility]

## 2023-11-08 NOTE — TELEPHONE ENCOUNTER
Pt called and left another  to call back. Returned her call, able to talk with her.     The pharmacy provided the wrong test strips for her meter. She received the OneTouch Ultra 2 meter, but received Onetouch Verio test strips. When I was talking to her she was at the pharmacy getting the strips changed out. I updated her order in epic.     Encouraged her to begin checking BG this evening after dinner.     Also notes ketones higher today, not sure of number but higher than 5. Discussed eating HS snack.     Marli Garcia RD, LD, [facility]

## 2023-11-08 NOTE — TELEPHONE ENCOUNTER
Attempted to call Asmita again but phone went right to . Did not leave message as I left one earlier today.    Marli Garcia RD, LD, [facility]

## 2023-11-08 NOTE — TELEPHONE ENCOUNTER
Asmita called triage line requesting call back. Called her back using  services. No answer, left VM requesting her to call back.     Marli Garcia RD, LD, [facility]

## 2023-11-13 ENCOUNTER — VIRTUAL VISIT (OUTPATIENT)
Dept: EDUCATION SERVICES | Facility: CLINIC | Age: 33
End: 2023-11-13
Payer: COMMERCIAL

## 2023-11-13 DIAGNOSIS — O24.419 GESTATIONAL DIABETES MELLITUS (GDM) IN THIRD TRIMESTER, GESTATIONAL DIABETES METHOD OF CONTROL UNSPECIFIED: Primary | ICD-10-CM

## 2023-11-13 PROCEDURE — G0108 DIAB MANAGE TRN  PER INDIV: HCPCS

## 2023-11-13 NOTE — LETTER
11/13/2023         RE: Asmita Frye  [address] [facility] [address]        Dear Colleague,    Thank you for referring your patient, Asmita Frye, to the [facility]. Please see a copy of my visit note below.    Diabetes Self-Management Education & Support  Type of Service: Telephone Visit    Originating Location (Patient Location): Home  Distant Location (Provider Location): Home - [facility]  Mode of Communication:  Telephone    Telephone Visit Start Time:  9:38 AM  Telephone Visit End Time (telephone visit stop time): ***    How would patient like to obtain AVS? {AVS Preference:[id number]}    SUBJECTIVE/OBJECTIVE:  Presents for education related to gestational diabetes.    Accompanied by: Self,   Diabetes management related comments/concerns: glucose monitoring and meal planning  Gestational weeks: 32w1d  Hospital planned for delivery: [facility]  Next OB Visit Date: 11/21/23  Number of previous pregnancies: 2  Had any babies over 9 lbs: No  Previously had Gestational Diabetes: No  Have you ever had thyroid problems or taken thyroid medication?: No  Hypertension : No  High Cholesterol: No  High Triglycerides: No  Do you use tobacco products?: No  Do you drink beer, wine or hard liquor?: No    Cultural Influences/Ethnic Background:   or       LMP 03/27/2023     Weight gain not assessed today  at *** weeks gestation.    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)  Cultural/Latter-day diet restrictions?: No  Meal planning/habits: None  How many times a  week on average do you eat food made away from home (restaurant/take-out)?: 1  Breakfast: today= oatmeal (made with milk)  Lunch: yesterday = out for Chinese  Dinner: last night = salad (spinach), chicken, egg  Snacks: AM: corn soup (esquites); PM yesterday = none; HS last night = pear  Beverages: Water, Tea  Pre- vitamin?: Yes  Supplements?: No  Experiencing nausea?: No  Experiencing heartburn?: Yes    Healthy Coping:  Emotional response to diabetes: Ready to learn  Stage of change: ACTION (Actively working towards change)    Current Management:  Taking medications for gestational diabetes?: No  Difficulty affording diabetes testing supplies?: No    ASSESSMENT:  Ketones: ***.   Fasting blood glucoses: ***% in target.  After breakfast: ***% in target.  After lunch: ***% in target.  After dinner: ***% in target.    ***    INTERVENTION:  Educational topics covered today:  What to expect after delivery, Future testing for Type 2 diabetes (2 hour OGTT at 6 week post-partum check-up and annual fasting blood glucose level), Risk of GDM and planning ahead for future pregnancies, Recommended lifestyle interventions for reducing the risk of Type 2 Diabetes, When to Call a Diabetes Educator or OB Provider    Educational Materials provided today:  [employer] Preventing Diabetes    PLAN:  Check glucose *** times daily.  Check ketones once a week when readings are consistently negative.  Continue with recommended physical activity.  Continue to follow recommended meal plan: *** carbs at breakfast, *** carbs at lunch, *** carbs at supper, *** carbs at snacks.  Follow consistent CHO meal plan, eat CHO and protein/fat at all meals/snacks.    Call/e-mail/[employer] message diabetes educator if 3 or more blood sugars are above the goal in 1 week or if ketones are positive.    ***  Time Spent: {:950510} minutes  Encounter Type: Individual    Any diabetes medication dose changes were made via the CDE Protocol and Collaborative Practice  Agreement with the patient's {:[id number]}. A copy of this encounter was shared with the provider.     Diabetes Self-Management Education & Support  Type of Service: Telephone Visit    Originating Location (Patient Location): Home  Distant Location (Provider Location): [facility]  Mode of Communication:  Telephone    Telephone Visit Start Time:  9:38 AM  Telephone Visit End Time (telephone visit stop time): 10:44 AM  Armenian phone  #[phone] used for the duration of the visit    How would patient like to obtain AVS? Not needed    SUBJECTIVE/OBJECTIVE:  Presents for education related to gestational diabetes.    Accompanied by: Self,   Diabetes management related comments/concerns: glucose monitoring and meal planning  Gestational weeks: 32w1d  Hospital planned for delivery: [facility] OB Visit Date: 11/21/23  Number of previous pregnancies: 2  Had any babies over 9 lbs: No  Previously had Gestational Diabetes: No  Have you ever had thyroid problems or taken thyroid medication?: No  Hypertension : No  High Cholesterol: No  High Triglycerides: No  Do you use tobacco products?: No  Do you drink beer, wine or hard liquor?: No    Cultural Influences/Ethnic Background:   or     LMP 03/27/2023     Weight gain not assessed today      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)  Cultural/Jain diet restrictions?: No  Meal planning/habits: None  How many times a week on average do you eat food made away from home (restaurant/take-out)?: 1  Breakfast: today= oatmeal (made with  milk)  Lunch: yesterday = out for Chinese  Dinner: last night = salad (spinach), chicken, egg  Snacks: AM: corn soup (esquites); PM yesterday = none; HS last night = pear  Beverages: Water, Tea  Pre- vitamin?: Yes  Supplements?: No  Experiencing nausea?: No  Experiencing heartburn?: Yes    Healthy Coping:  Emotional response to diabetes: Ready to learn  Stage of change: ACTION (Actively working towards change)    Current Management:  Taking medications for gestational diabetes?: No  Difficulty affording diabetes testing supplies?: No    ASSESSMENT:  Ketones: large x5.   Fasting blood glucoses: 0% in target.  After breakfast: 100% in target.  After lunch: 33% in target.  After dinner: 40% in target.    Focused visit on having HS snack of carbohydrate + protein with goal of improving fasting glucose as well as decreasing urinary ketones.  Declines to start insulin at HS today and states she will work on changing HS snack.  Reviewed recommended portion of rice and rice + beans at other meals.     Deferred discussion of the following topics to a future visit due to insufficient visit time: What to expect after delivery, Future testing for Type 2 diabetes (2 hour OGTT at 6 week post-partum check-up and annual fasting blood glucose level), Risk of GDM and planning ahead for future pregnancies, Recommended lifestyle interventions for reducing the risk of Type 2 Diabetes, When to Call a Diabetes Educator or OB Provider    INTERVENTION:  Educational topics covered today:  Recommended meal plan    Educational Materials provided today:  No new materials provided today    PLAN:  Test glucose 4 times per day:              Fasting (when you first awake for the day): 95 mg/dL or below              1 hour after breakfast: 140 mg/dL or below              1 hour after lunch: 140 mg/dL or below              1 hour after dinner: 140 mg/dL or below                 Please bring your meter and log book to all appointments                  If you miss 1 hour after meal test, test 2 hours after the meal.  Goal 2 hours after is 120 mg/dL or below.      2.  Check your urine ketones once a day, when you first awake for the day until they are negative to trace for 7 days in a row.  Then decrease and check once a week.      3.  Meal Plan  -focus on bedtime snack, choose: 1 slice whole grain bread + protein (eggs, cheese, sausage, etc) OR 1 individual container Greek yogurt    -limit rice at meals to 1 cup cooked.  IF having beans, 1 cup cooked rice + beans (together)    4.  Aim for 20-30 minutes of activity most days of the week (with the okay of your OB provider).       5.  Follow up: Thursday, November 16th at 9:30 AM.     6.  Call Diabetes Education at [phone] or send a [employer] message with:              -questions or concerns              -ketones that are small, moderate, or large              -3 or more blood sugars above target in a 7 day period    Marisol Strange, MPH, RD, CDCES, LD 11/13/2023    Time Spent: 66 minutes  Encounter Type: Individual    Any diabetes medication dose changes were made via the CDE Protocol and Collaborative Practice Agreement with the patient's referring provider. A copy of this encounter was shared with the provider.

## 2023-11-13 NOTE — LETTER
11/13/2023         RE: Asmita Frye  [address] [facility] [address]        Dear Colleague,    Thank you for referring your patient, Asmita Frye, to the [facility]. Please see a copy of my visit note below.    Diabetes Self-Management Education & Support  Type of Service: Telephone Visit    Originating Location (Patient Location): Home  Distant Location (Provider Location): [place] - [facility]  Mode of Communication:  Telephone    Telephone Visit Start Time:  9:38 AM  Telephone Visit End Time (telephone visit stop time): 10:44 AM  Austrian phone  #[phone] used for the duration of the visit    How would patient like to obtain AVS? Not needed    SUBJECTIVE/OBJECTIVE:  Presents for education related to gestational diabetes.    Accompanied by: Self,   Diabetes management related comments/concerns: glucose monitoring and meal planning  Gestational weeks: 32w1d  Hospital planned for delivery: [facility]  Next OB Visit Date: 11/21/23  Number of previous pregnancies: 2  Had any babies over 9 lbs: No  Previously had Gestational Diabetes: No  Have you ever had thyroid problems or taken thyroid medication?: No  Hypertension : No  High Cholesterol: No  High Triglycerides: No  Do you use tobacco products?: No  Do you drink beer, wine or hard liquor?: No    Cultural Influences/Ethnic Background:   or     LMP 03/27/2023     Weight gain not assessed today      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 Continue doxycycline. No intercourse until both he and partner treated for at least 1 week. All partner/s in the past 60 days need to be checked and treated. Thank you.   planning/habits: None  How many times a week on average do you eat food made away from home (restaurant/take-out)?: 1  Breakfast: today= oatmeal (made with milk)  Lunch: yesterday = out for Chinese  Dinner: last night = salad (spinach), chicken, egg  Snacks: AM: corn soup (esquites); PM yesterday = none; HS last night = pear  Beverages: Water, Tea  Pre-lanette vitamin?: Yes  Supplements?: No  Experiencing nausea?: No  Experiencing heartburn?: Yes    Healthy Coping:  Emotional response to diabetes: Ready to learn  Stage of change: ACTION (Actively working towards change)    Current Management:  Taking medications for gestational diabetes?: No  Difficulty affording diabetes testing supplies?: No    ASSESSMENT:  Ketones: large x5.   Fasting blood glucoses: 0% in target.  After breakfast: 100% in target.  After lunch: 33% in target.  After dinner: 40% in target.    Focused visit on having HS snack of carbohydrate + protein with goal of improving fasting glucose as well as decreasing urinary ketones.  Declines to start insulin at HS today and states she will work on changing HS snack.  Reviewed recommended portion of rice and rice + beans at other meals.     Deferred discussion of the following topics to a future visit due to insufficient visit time: What to expect after delivery, Future testing for Type 2 diabetes (2 hour OGTT at 6 week post-partum check-up and annual fasting blood glucose level), Risk of GDM and planning ahead for future pregnancies, Recommended lifestyle interventions for reducing the risk of Type 2 Diabetes, When to Call a Diabetes Educator or OB Provider    INTERVENTION:  Educational topics covered today:  Recommended meal plan    Educational Materials provided today:  No new materials provided today    PLAN:  Test glucose 4 times per day:              Fasting (when you first awake for the day): 95 mg/dL or below              1 hour after breakfast: 140 mg/dL or below              1 hour after lunch:  140 mg/dL or below              1 hour after dinner: 140 mg/dL or below                 Please bring your meter and log book to all appointments                 If you miss 1 hour after meal test, test 2 hours after the meal.  Goal 2 hours after is 120 mg/dL or below.      2.  Check your urine ketones once a day, when you first awake for the day until they are negative to trace for 7 days in a row.  Then decrease and check once a week.      3.  Meal Plan  -focus on bedtime snack, choose: 1 slice whole grain bread + protein (eggs, cheese, sausage, etc) OR 1 individual container Greek yogurt    -limit rice at meals to 1 cup cooked.  IF having beans, 1 cup cooked rice + beans (together)    4.  Aim for 20-30 minutes of activity most days of the week (with the okay of your OB provider).       5.  Follow up: Thursday, November 16th at 9:30 AM.     6.  Call Diabetes Education at [phone] or send a [employer] message with:              -questions or concerns              -ketones that are small, moderate, or large              -3 or more blood sugars above target in a 7 day period    Marisol Strange, MPH, RD, CDCES, LD 11/13/2023    Time Spent: 66 minutes  Encounter Type: Individual    Any diabetes medication dose changes were made via the CDE Protocol and Collaborative Practice Agreement with the patient's referring provider. A copy of this encounter was shared with the provider.

## 2023-11-13 NOTE — PROGRESS NOTES
Diabetes Self-Management Education & Support  Type of Service: Telephone Visit    Originating Location (Patient Location): Home  Distant Location (Provider Location): [facility]  Mode of Communication:  Telephone    Telephone Visit Start Time:  9:38 AM  Telephone Visit End Time (telephone visit stop time): 10:44 AM  Tunisian phone  #[phone] used for the duration of the visit    How would patient like to obtain AVS? Not needed    SUBJECTIVE/OBJECTIVE:  Presents for education related to gestational diabetes.    Accompanied by: Self,   Diabetes management related comments/concerns: glucose monitoring and meal planning  Gestational weeks: 32w1d  Hospital planned for delivery: [facility] OB Visit Date: 11/21/23  Number of previous pregnancies: 2  Had any babies over 9 lbs: No  Previously had Gestational Diabetes: No  Have you ever had thyroid problems or taken thyroid medication?: No  Hypertension : No  High Cholesterol: No  High Triglycerides: No  Do you use tobacco products?: No  Do you drink beer, wine or hard liquor?: No    Cultural Influences/Ethnic Background:   or     LMP 03/27/2023     Weight gain not assessed today      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)  Cultural/Hinduism diet restrictions?: No  Meal planning/habits: None  How many times a week on average do you eat food made away from home (restaurant/take-out)?: 1  Breakfast: today= oatmeal (made with milk)  Lunch: yesterday = out for Chinese  Dinner: last night = salad (spinach), chicken, egg  Snacks:  AM: corn soup (esquites); PM yesterday = none; HS last night = pear  Beverages: Water, Tea  Pre-lanette vitamin?: Yes  Supplements?: No  Experiencing nausea?: No  Experiencing heartburn?: Yes    Healthy Coping:  Emotional response to diabetes: Ready to learn  Stage of change: ACTION (Actively working towards change)    Current Management:  Taking medications for gestational diabetes?: No  Difficulty affording diabetes testing supplies?: No    ASSESSMENT:  Ketones: large x5.   Fasting blood glucoses: 0% in target.  After breakfast: 100% in target.  After lunch: 33% in target.  After dinner: 40% in target.    Focused visit on having HS snack of carbohydrate + protein with goal of improving fasting glucose as well as decreasing urinary ketones.  Declines to start insulin at HS today and states she will work on changing HS snack.  Reviewed recommended portion of rice and rice + beans at other meals.     Deferred discussion of the following topics to a future visit due to insufficient visit time: What to expect after delivery, Future testing for Type 2 diabetes (2 hour OGTT at 6 week post-partum check-up and annual fasting blood glucose level), Risk of GDM and planning ahead for future pregnancies, Recommended lifestyle interventions for reducing the risk of Type 2 Diabetes, When to Call a Diabetes Educator or OB Provider    INTERVENTION:  Educational topics covered today:  Recommended meal plan    Educational Materials provided today:  No new materials provided today    PLAN:  Test glucose 4 times per day:              Fasting (when you first awake for the day): 95 mg/dL or below              1 hour after breakfast: 140 mg/dL or below              1 hour after lunch: 140 mg/dL or below              1 hour after dinner: 140 mg/dL or below                 Please bring your meter and log book to all appointments                 If you miss 1 hour after meal test, test 2 hours after the meal.  Goal 2 hours after is 120 mg/dL  or below.      2.  Check your urine ketones once a day, when you first awake for the day until they are negative to trace for 7 days in a row.  Then decrease and check once a week.      3.  Meal Plan  -focus on bedtime snack, choose: 1 slice whole grain bread + protein (eggs, cheese, sausage, etc) OR 1 individual container Greek yogurt    -limit rice at meals to 1 cup cooked.  IF having beans, 1 cup cooked rice + beans (together)    4.  Aim for 20-30 minutes of activity most days of the week (with the okay of your OB provider).       5.  Follow up: Thursday, November 16th at 9:30 AM.     6.  Call Diabetes Education at [phone] or send a [employer] message with:              -questions or concerns              -ketones that are small, moderate, or large              -3 or more blood sugars above target in a 7 day period    Marisol Strange, MPH, RD, CDCES, LD 11/13/2023    Time Spent: 66 minutes  Encounter Type: Individual    Any diabetes medication dose changes were made via the CDE Protocol and Collaborative Practice Agreement with the patient's referring provider. A copy of this encounter was shared with the provider.

## 2023-11-16 ENCOUNTER — VIRTUAL VISIT (OUTPATIENT)
Dept: EDUCATION SERVICES | Facility: CLINIC | Age: 33
End: 2023-11-16
Payer: COMMERCIAL

## 2023-11-16 DIAGNOSIS — O24.419 GESTATIONAL DIABETES MELLITUS (GDM) IN THIRD TRIMESTER, GESTATIONAL DIABETES METHOD OF CONTROL UNSPECIFIED: Primary | ICD-10-CM

## 2023-11-16 PROCEDURE — G0108 DIAB MANAGE TRN  PER INDIV: HCPCS

## 2023-11-16 NOTE — PROGRESS NOTES
Diabetes and Pregnancy Follow-up  Type of Service: Telephone Visit/ 102 minutes     Originating Location (Patient Location): Home  Distant Location (Provider Location): [facility] [place]  Mode of Communication:  Telephone  Burkinan phone interpreters #[id number], [id number], and [phone] used      Telephone Visit Start Time: 9:51 AM, 9:57 AM, 4:34 PM  Telephone Visit End Time (telephone visit stop time): 9:53 AM, 10:34 AM,  5:37 PM  (Initial call ended due to technical difficulties, 2nd call ended due to writer's schedule)    How would patient like to obtain AVS? Not needed (likely would not arrive prior to scheduled visit on 11/20/23)    Subjective/Objective:    Asmita Frye was called for a scheduled BG review. Last date of communication was: 11/13/23.    Gestational diabetes is being managed with diet and activity    Taking diabetes medications: no    Estimated Date of Delivery: Jan 7, 2024    Blood Glucose/Ketone Log:    Date Ketones (5 AM) Fasting (8-8:30AM) Post Breakfast  (1 hour later) Before Lunch Before Supper   11/13 80 98 134 139 103   11/14 15 99 109 123 108   11/15 80 100 86 111 108   11/16 80 99        Patient reported usual day:   Up for day: 5 AM, checks urine ketones  8-8:30 AM: Chobani yogurt OR bread (2) with peanut butter   10-10:30 AM: egg, cucumber, spinach  12-1 PM: cream soup (shrimp) and crackers OR cream soup (carrot and broccoli), water  Snack: Water, almonds, fruit  6-7 PM: last night = salad with peanuts, onions, tomatoes, onions, croutons, chicken, small amount dressing  8:30-9 PM: bread with peanut butter   To Bed at 9 PM    Assessment:  Ketones: small x1, large x3.   Fasting blood glucoses: 0% in target.  After breakfast: 100% in target.  Before lunch: 0% in target.  After lunch: n/a% in target.  Before dinner: 0% in target.  After dinner: n/a% in target.    Extended visit with patient today in attempts to correct some misunderstandings.  First discussed when to test blood sugars and  blood sugar goals.  Patient was able to repeat this back correctly.  Then worked to discuss recommended meal plan with a focus on having a meal or snack every 2-3 hours while awake AND adding in carbohydrates with goal of decreasing urine ketones.  Expect meal planning to need adjusting/refining as writer estimated portions (carbohydrates as best able based on patient's report).  Recommend patient make changes in glucose monitoring and meal plan and then reassess for need to start insulin at in person visit scheduled for Monday, 11/20/23.     Plan/Response:  Test glucose 4 times per day:              Fasting (when you first awake for the day): 95 mg/dL or below              1 hour after breakfast: 140 mg/dL or below              1 hour after lunch: 140 mg/dL or below              1 hour after dinner: 140 mg/dL or below                 Please bring your meter and log book to all appointments                 If you miss 1 hour after meal test, test 2 hours after the meal.  Goal 2 hours after is 120 mg/dL or below.      2.  Check your urine ketones once a day, when you first awake for the day until they are negative to trace for 7 days in a row.  Then decrease and check once a week.      3.  Meal Plan     Snack (5-6 AM):  Chobani yogurt OR bread (2) with peanut butter     Breakfast (8-9 AM): 1-2 slices whole grain bread + protein (eggs, cheese, sausage, etc)     Snack (10-11 AM): 2 Belvita bars (1/2 package) + nuts      Lunch (1-2 PM): soup + crackers     Snack (4-5 PM): portion of fruit (1/2 banana, 15 grapes, 15 cherries, baseball size apple) + almonds     Dinner (6-7 PM):   non-starchy vegetables+ protein + carbohydrate (rice, potato, small corn tortillas (5, decrease to 3 IF also having round beans)      Snack (9 PM):  1 slice bread with peanut butter AND/OR 1 cup milk      Remember to consume some carbohydrate every 2-3 hours while awake     4.  Aim for 20-30 minutes of activity most days of the week (with the  okay of your OB provider).       5.  Follow up: Monday, November 20th at 8:30 AM with Flora in [place]     6.  Call Diabetes Education at [phone] or send a Recorded [employer] message with:              -questions or concerns              -ketones that are small, moderate, or large              -3 or more blood sugars above target in a 7 day period      Marisol Strange, MPH, RD, [facility], LD 11/16/2023    Time Spent: 102 minutes    Any diabetes medication dose changes were made via the CDE Protocol and Collaborative Practice Agreement with the patient's referring provider. A copy of this encounter was shared with the provider.

## 2023-11-16 NOTE — LETTER
11/16/2023         RE: Asmita Frye  [address] [facility] [place] [address]        Dear Colleague,    Thank you for referring your patient, Asmita Frye, to the [facility]. Please see a copy of my visit note below.    Diabetes and Pregnancy Follow-up  Type of Service: Telephone Visit/ 102 minutes     Originating Location (Patient Location): Home  Distant Location (Provider Location): [place] [facility]  Mode of Communication:  Telephone  Georgian phone interpreters #[id number], [id number], and [phone] used      Telephone Visit Start Time: 9:51 AM, 9:57 AM, 4:34 PM  Telephone Visit End Time (telephone visit stop time): 9:53 AM, 10:34 AM,  5:37 PM  (Initial call ended due to technical difficulties, 2nd call ended due to writer's schedule)    How would patient like to obtain AVS? Not needed (likely would not arrive prior to scheduled visit on 11/20/23)    Subjective/Objective:    Asmita Frye was called for a scheduled BG review. Last date of communication was: 11/13/23.    Gestational diabetes is being managed with diet and activity    Taking diabetes medications: no    Estimated Date of Delivery: Jan 7, 2024    Blood Glucose/Ketone Log:    Date Ketones (5 AM) Fasting (8-8:30AM) Post Breakfast  (1 hour later) Before Lunch Before Supper   11/13 80 98 134 139 103   11/14 15 99 109 123 108   11/15 80 100 86 111 108   11/16 80 99        Patient reported usual day:   Up for day: 5 AM, checks urine ketones  8-8:30 AM: Chobani yogurt OR bread (2) with peanut butter   10-10:30 AM: egg, cucumber, spinach  12-1 PM: cream soup (shrimp) and crackers OR cream soup (carrot and broccoli), water  Snack: Water, almonds, fruit  6-7 PM: last night = salad with peanuts, onions, tomatoes, onions, croutons, chicken, small amount dressing  8:30-9 PM: bread with peanut butter   To Bed at 9 PM    Assessment:  Ketones: small x1, large x3.   Fasting blood glucoses: 0% in target.  After breakfast: 100% in  target.  Before lunch: 0% in target.  After lunch: n/a% in target.  Before dinner: 0% in target.  After dinner: n/a% in target.    Extended visit with patient today in attempts to correct some misunderstandings.  First discussed when to test blood sugars and blood sugar goals.  Patient was able to repeat this back correctly.  Then worked to discuss recommended meal plan with a focus on having a meal or snack every 2-3 hours while awake AND adding in carbohydrates with goal of decreasing urine ketones.  Expect meal planning to need adjusting/refining as writer estimated portions (carbohydrates as best able based on patient's report).  Recommend patient make changes in glucose monitoring and meal plan and then reassess for need to start insulin at in person visit scheduled for Monday, 11/20/23.     Plan/Response:  Test glucose 4 times per day:              Fasting (when you first awake for the day): 95 mg/dL or below              1 hour after breakfast: 140 mg/dL or below              1 hour after lunch: 140 mg/dL or below              1 hour after dinner: 140 mg/dL or below                 Please bring your meter and log book to all appointments                 If you miss 1 hour after meal test, test 2 hours after the meal.  Goal 2 hours after is 120 mg/dL or below.      2.  Check your urine ketones once a day, when you first awake for the day until they are negative to trace for 7 days in a row.  Then decrease and check once a week.      3.  Meal Plan     Snack (5-6 AM):  Chobani yogurt OR bread (2) with peanut butter     Breakfast (8-9 AM): 1-2 slices whole grain bread + protein (eggs, cheese, sausage, etc)     Snack (10-11 AM): 2 Belvita bars (1/2 package) + nuts      Lunch (1-2 PM): soup + crackers     Snack (4-5 PM): portion of fruit (1/2 banana, 15 grapes, 15 cherries, baseball size apple) + almonds     Dinner (6-7 PM):   non-starchy vegetables+ protein + carbohydrate (rice, potato, small corn tortillas (5,  decrease to 3 IF also having round beans)      Snack (9 PM):  1 slice bread with peanut butter AND/OR 1 cup milk      Remember to consume some carbohydrate every 2-3 hours while awake     4.  Aim for 20-30 minutes of activity most days of the week (with the okay of your OB provider).       5.  Follow up: Monday, November 20th at 8:30 AM with Fl[facility] in [place]     6.  Call Diabetes Education at [phone] or send a [employer] message with:              -questions or concerns              -ketones that are small, moderate, or large              -3 or more blood sugars above target in a 7 day period      Marisol Strange, MPH, RD, CDCES, LD 11/16/2023    Time Spent: 102 minutes    Any diabetes medication dose changes were made via the CDE Protocol and Collaborative Practice Agreement with the patient's referring provider. A copy of this encounter was shared with the provider.

## 2023-11-17 ENCOUNTER — APPOINTMENT (OUTPATIENT)
Dept: INTERPRETER SERVICES | Facility: CLINIC | Age: 33
End: 2023-11-17
Payer: COMMERCIAL

## 2023-11-20 ENCOUNTER — VIRTUAL VISIT (OUTPATIENT)
Dept: EDUCATION SERVICES | Facility: CLINIC | Age: 33
End: 2023-11-20
Payer: COMMERCIAL

## 2023-11-20 DIAGNOSIS — O24.419 GESTATIONAL DIABETES MELLITUS (GDM) IN THIRD TRIMESTER, GESTATIONAL DIABETES METHOD OF CONTROL UNSPECIFIED: Primary | ICD-10-CM

## 2023-11-20 PROCEDURE — 99207 PR DROP WITH A PROCEDURE: CPT | Performed by: DIETITIAN, REGISTERED

## 2023-11-20 PROCEDURE — G0108 DIAB MANAGE TRN  PER INDIV: HCPCS | Mod: AE | Performed by: DIETITIAN, REGISTERED

## 2023-11-20 NOTE — PROGRESS NOTES
Diabetes Self-Management Education & Support  Type of Service: Telephone Visit  Originating Location (Patient Location): Home  Distant Location (Provider Location):  [facility] [place] [facility] [place]  Mode of Communication:  Telephone  Telephone Visit Start Time: 830  Telephone Visit End Time (telephone visit stop time): 900  How would patient like to obtain AVS? Email  - OK to email [email or web address]       SUBJECTIVE/OBJECTIVE:  Presents for education related to gestational diabetes.    Accompanied by: Self,   Diabetes management related comments/concerns: glucose monitoring and meal planning  Gestational weeks: 33w1d  Next OB Visit Date: 23  Number of previous pregnancies: 2  Had any babies over 9 lbs: No  Previously had Gestational Diabetes: No  Have you ever had thyroid problems or taken thyroid medication?: No  Hypertension : No  High Cholesterol: No  High Triglycerides: No  Do you use tobacco products?: No  Do you drink beer, wine or hard liquor?: No    Cultural Influences/Ethnic Background:   or     LMP 2023     Wt Readings from Last 5 Encounters:   23 94.6 kg (208 lb 9.6 oz)   23 95.3 kg (210 lb)   10/13/23 95.6 kg (210 lb 12.8 oz)   23 94.5 kg (208 lb 6.4 oz)   23 94.1 kg (207 lb 6.4 oz)       Estimated Date of Delivery: 2024    Lifestyle and Health Behaviors:  Exercise:: Yes (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)  Cultural/Buddhist diet restrictions?: No  Meal planning/habits: None  How many times a week on average do you eat food made away from home (restaurant/take-out)?: 1  Pre-lanette vitamin?: Yes  Supplements?: No  Experiencing nausea?: No  Experiencing heartburn?: Yes    Healthy Coping:  Emotional response to diabetes: Ready to learn  Stage of change: ACTION (Actively working towards change)    Current Management:  Taking medications for gestational diabetes?: No  Difficulty affording diabetes testing supplies?:  No    ASSESSMENT:  Ketones: improved slightly with more 40s (40-80 is moderate)   Fasting blood glucoses: 0% in target.  After breakfast: 75% in target.  After lunch: 100% in target.  After dinner: 100% in target.    Date Breakfast Breakfast Lunch Dinner    Before After After After   11/16 99 109 132 122   11/17 106 133 122 128   11/18 104 159 119 115   11/19 104 121 121 122   11/20 102        Asmita did a good job of checking blood sugars 1 hour after eating and post prandial readings are in target.  She knows the morning blood sugars are higher and reviewed the pathophysiology of why this happens.  Overall feels to be eating enough and feels to be eating healthfully for baby.  Discussed slightly increasing snacks, carbohydrates more at certain meals that are getting a little lower carbohydrate as this may help blood sugars.  Discussed care after delivery, risk of type 2 later in life.  Asmita thinks this will be her last child.   With higher fasting readings recommend starting NPH insulin and pharmacy liaison team notes that Humulin N pens are preferred and 0$ copay.   Discussed reason why insulin is used in pregnancy.   Set phone follow up in 2 days to review insulin administration after she watches the video link (emailed) and discusses with OB tomorrow.     Food notes:   On the weekends eating out might be  lower carbohydrate at restaurants  - steak and salad   Used to throw up frequently due to morning sickness and is feeling much better and healthy   Snack at night time  - does well with  whole grain bread with egg OR peanut butter OR belivta + almonds     INTERVENTION:  Educational topics covered today:  What to expect after delivery, Future testing for Type 2 diabetes (2 hour OGTT at 6 week post-partum check-up and annual fasting blood glucose level), Risk of GDM and planning ahead for future pregnancies, Recommended lifestyle interventions for reducing the risk of Type 2 Diabetes, When to Call a Diabetes  Educator or OB Provider    Educational Materials provided today:  [facility] Preventing Diabetes    PLAN:  Check glucose 4 times daily.  Check ketones once a week when readings are consistently negative.  Continue with recommended physical activity.  Continue to follow recommended meal plan  Follow consistent CHO meal plan, eat CHO and protein/fat at all meals/snacks.  Watch email link video for insulin administration example  Telephone encounter to OB for NPH  Follow up with OB 11/21, and Diabetes ed 11/22    Liliana Castillo RD, LD, [facility]  Diabetes Education    Time Spent: 30 minutes  Encounter Type: Individual    A copy of this encounter was shared with the provider.

## 2023-11-20 NOTE — TELEPHONE ENCOUNTER
Hi Dr. Sesay,     Please note if you approve of this plan or indicate an alternate plan.     Fasting blood sugars have remained above target with a slight further increase over the last week.  OK to begin NPH insulin at 10 units at bedtime (0.1units/kg) .  Post prandial readings are in target.   I can order if you agree, the pharmacy liaison team said Humulin N pens are fully covered with no copay.       Date Breakfast Breakfast Lunch Dinner    Before After After After   11/16 99 109 132 122   11/17 106 133 122 128   11/18 104 159 119 115   11/19 104 121 121 122   11/20 102        Thanks!  Flora Castillo RD, LD, [facility]  Diabetes Education

## 2023-11-20 NOTE — LETTER
2023         RE: Asmita Frye  [address] [facility] [place] [address]        Dear Colleague,    Thank you for referring your patient, Asmita Frye, to the [facility]. Please see a copy of my visit note below.    Diabetes Self-Management Education & Support  Type of Service: Telephone Visit  Originating Location (Patient Location): Home  Distant Location (Provider Location): [facility]  Mode of Communication:  Telephone  Telephone Visit Start Time: 830  Telephone Visit End Time (telephone visit stop time): 900  How would patient like to obtain AVS? Email  - OK to email [email or web address]       SUBJECTIVE/OBJECTIVE:  Presents for education related to gestational diabetes.    Accompanied by: Self,   Diabetes management related comments/concerns: glucose monitoring and meal planning  Gestational weeks: 33w1d  Next OB Visit Date: 23  Number of previous pregnancies: 2  Had any babies over 9 lbs: No  Previously had Gestational Diabetes: No  Have you ever had thyroid problems or taken thyroid medication?: No  Hypertension : No  High Cholesterol: No  High Triglycerides: No  Do you use tobacco products?: No  Do you drink beer, wine or hard liquor?: No    Cultural Influences/Ethnic Background:   or     LMP 2023     Wt Readings from Last 5 Encounters:   23 94.6 kg (208 lb 9.6 oz)   23 95.3 kg (210 lb)   10/13/23 95.6 kg (210 lb 12.8 oz)   23 94.5 kg (208 lb 6.4 oz)   23 94.1 kg (207 lb 6.4 oz)       Estimated Date of Delivery: 2024    Lifestyle and Health Behaviors:  Exercise:: Yes (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)  Cultural/Protestant diet restrictions?: No  Meal planning/habits: None  How many times a week on average do you eat food made away from home (restaurant/take-out)?: 1  Pre-lanette vitamin?: Yes  Supplements?: No  Experiencing nausea?: No  Experiencing  heartburn?: Yes    Healthy Coping:  Emotional response to diabetes: Ready to learn  Stage of change: ACTION (Actively working towards change)    Current Management:  Taking medications for gestational diabetes?: No  Difficulty affording diabetes testing supplies?: No    ASSESSMENT:  Ketones: improved slightly with more 40s (40-80 is moderate)   Fasting blood glucoses: 0% in target.  After breakfast: 75% in target.  After lunch: 100% in target.  After dinner: 100% in target.    Date Breakfast Breakfast Lunch Dinner    Before After After After   11/16 99 109 132 122   11/17 106 133 122 128   11/18 104 159 119 115   11/19 104 121 121 122   11/20 102        Asmita did a good job of checking blood sugars 1 hour after eating and post prandial readings are in target.  She knows the morning blood sugars are higher and reviewed the pathophysiology of why this happens.  Overall feels to be eating enough and feels to be eating healthfully for baby.  Discussed slightly increasing snacks, carbohydrates more at certain meals that are getting a little lower carbohydrate as this may help blood sugars.  Discussed care after delivery, risk of type 2 later in life.  Asmita thinks this will be her last child.   With higher fasting readings recommend starting NPH insulin and pharmacy liaison team notes that Humulin N pens are preferred and 0$ copay.   Discussed reason why insulin is used in pregnancy.   Set phone follow up in 2 days to review insulin administration after she watches the video link (emailed) and discusses with OB tomorrow.     Food notes:   On the weekends eating out might be  lower carbohydrate at restaurants  - steak and salad   Used to throw up frequently due to morning sickness and is feeling much better and healthy   Snack at night time  - does well with  whole grain bread with egg OR peanut butter OR belivta + almonds     INTERVENTION:  Educational topics covered today:  What to expect after delivery, Future  testing for Type 2 diabetes (2 hour OGTT at 6 week post-partum check-up and annual fasting blood glucose level), Risk of GDM and planning ahead for future pregnancies, Recommended lifestyle interventions for reducing the risk of Type 2 Diabetes, When to Call a Diabetes Educator or OB Provider    Educational Materials provided today:  [facility] Preventing Diabetes    PLAN:  Check glucose 4 times daily.  Check ketones once a week when readings are consistently negative.  Continue with recommended physical activity.  Continue to follow recommended meal plan  Follow consistent CHO meal plan, eat CHO and protein/fat at all meals/snacks.  Watch email link video for insulin administration example  Telephone encounter to OB for NPH  Follow up with OB 11/21, and Diabetes ed 11/22    Liliana Castillo RD, LD, [facility]  Diabetes Education    Time Spent: 30 minutes  Encounter Type: Individual    A copy of this encounter was shared with the provider.

## 2023-11-20 NOTE — Clinical Note
2023         RE: Asmita Frye  [address] [facility] [address]        Dear Colleague,    Thank you for referring your patient, Asmita Frye, to the [facility]. Please see a copy of my visit note below.    Diabetes Self-Management Education & Support  Type of Service: Telephone Visit  Originating Location (Patient Location): Home  Distant Location (Provider Location): [facility]  Mode of Communication:  Telephone  Telephone Visit Start Time: 830  Telephone Visit End Time (telephone visit stop time): 900  How would patient like to obtain AVS? Email  - OK to email [email or web address]       SUBJECTIVE/OBJECTIVE:  Presents for education related to gestational diabetes.    Accompanied by: Self,   Diabetes management related comments/concerns: glucose monitoring and meal planning  Gestational weeks: 33w1d  Next OB Visit Date: 23  Number of previous pregnancies: 2  Had any babies over 9 lbs: No  Previously had Gestational Diabetes: No  Have you ever had thyroid problems or taken thyroid medication?: No  Hypertension : No  High Cholesterol: No  High Triglycerides: No  Do you use tobacco products?: No  Do you drink beer, wine or hard liquor?: No    Cultural Influences/Ethnic Background:   or     LMP 2023     Wt Readings from Last 5 Encounters:   23 94.6 kg (208 lb 9.6 oz)   23 95.3 kg (210 lb)   10/13/23 95.6 kg (210 lb 12.8 oz)   23 94.5 kg (208 lb 6.4 oz)   23 94.1 kg (207 lb 6.4 oz)       Estimated Date of Delivery: 2024    Lifestyle and Health Behaviors:  Exercise:: Yes (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)  Cultural/Restorationist diet restrictions?: No  Meal planning/habits: None  How many times a week on average do you eat food made away from home (restaurant/take-out)?: 1  Pre-lanette vitamin?: Yes  Supplements?: No  Experiencing nausea?: No  Experiencing  heartburn?: Yes    Healthy Coping:  Emotional response to diabetes: Ready to learn  Stage of change: ACTION (Actively working towards change)    Current Management:  Taking medications for gestational diabetes?: No  Difficulty affording diabetes testing supplies?: No    ASSESSMENT:  Ketones: improved slightly with more 40s (40-80 is moderate)   Fasting blood glucoses: 0% in target.  After breakfast: 75% in target.  After lunch: 100% in target.  After dinner: 100% in target.    Date Breakfast Breakfast Lunch Dinner    Before After After After   11/16 99 109 132 122   11/17 106 133 122 128   11/18 104 159 119 115   11/19 104 121 121 122   11/20 102        Asmita did a good job of checking blood sugars 1 hour after eating and post prandial readings are in target.  She knows the morning blood sugars are higher and reviewed the pathophysiology of why this happens.  Overall feels to be eating enough and feels to be eating healthfully for baby.  Discussed slightly increasing snacks, carbohydrates more at certain meals that are getting a little lower carbohydrate as this may help blood sugars.  Discussed care after delivery, risk of type 2 later in life.  Asmita thinks this will be her last child.   With higher fasting readings recommend starting NPH insulin and pharmacy liaison team notes that Humulin N pens are preferred and 0$ copay.   Discussed reason why insulin is used in pregnancy.   Set phone follow up in 2 days to review insulin administration after she watches the video link (emailed) and discusses with OB tomorrow.     Food notes:   On the weekends eating out might be  lower carbohydrate at restaurants  - steak and salad   Used to throw up frequently due to morning sickness and is feeling much better and healthy   Snack at night time  - does well with  whole grain bread with egg OR peanut butter OR belivta + almonds     INTERVENTION:  Educational topics covered today:  What to expect after delivery, Future  testing for Type 2 diabetes (2 hour OGTT at 6 week post-partum check-up and annual fasting blood glucose level), Risk of GDM and planning ahead for future pregnancies, Recommended lifestyle interventions for reducing the risk of Type 2 Diabetes, When to Call a Diabetes Educator or OB Provider    Educational Materials provided today:  [facility] Preventing Diabetes    PLAN:  Check glucose 4 times daily.  Check ketones once a week when readings are consistently negative.  Continue with recommended physical activity.  Continue to follow recommended meal plan  Follow consistent CHO meal plan, eat CHO and protein/fat at all meals/snacks.  Watch email link video for insulin administration example  Telephone encounter to OB for NPH  Follow up with OB 11/21, and Diabetes ed 11/22    Liliana Castillo RD, LD, [facility]  Diabetes Education    Time Spent: 30 minutes  Encounter Type: Individual    A copy of this encounter was shared with the provider.

## 2023-11-20 NOTE — TELEPHONE ENCOUNTER
Sounds good, I will discuss with her tomorrow. I agree, I think insulin at bedtime is needed. I will recommend this for her tomorrow and plan for you to do the follow up teaching with her the next day?     Glad that she started checking her sugars at the right time and the post meals look fairly good!    Thanks,  Melodie Sesay, DO

## 2023-11-21 ENCOUNTER — PRENATAL OFFICE VISIT (OUTPATIENT)
Dept: OBGYN | Facility: CLINIC | Age: 33
End: 2023-11-21
Payer: COMMERCIAL

## 2023-11-21 VITALS
BODY MASS INDEX: 39.27 KG/M2 | DIASTOLIC BLOOD PRESSURE: 65 MMHG | TEMPERATURE: 98 F | WEIGHT: 208 LBS | RESPIRATION RATE: 14 BRPM | SYSTOLIC BLOOD PRESSURE: 117 MMHG | HEIGHT: 61 IN | HEART RATE: 98 BPM

## 2023-11-21 DIAGNOSIS — O09.522 MULTIGRAVIDA OF ADVANCED MATERNAL AGE IN SECOND TRIMESTER: ICD-10-CM

## 2023-11-21 DIAGNOSIS — O24.414 INSULIN CONTROLLED GESTATIONAL DIABETES MELLITUS (GDM) IN THIRD TRIMESTER: ICD-10-CM

## 2023-11-21 DIAGNOSIS — Z34.83 PRENATAL CARE, SUBSEQUENT PREGNANCY IN THIRD TRIMESTER: Primary | ICD-10-CM

## 2023-11-21 DIAGNOSIS — Z98.891 HISTORY OF CESAREAN SECTION: ICD-10-CM

## 2023-11-21 DIAGNOSIS — E66.812 CLASS 2 OBESITY WITHOUT SERIOUS COMORBIDITY WITH BODY MASS INDEX (BMI) OF 38.0 TO 38.9 IN ADULT, UNSPECIFIED OBESITY TYPE: ICD-10-CM

## 2023-11-21 PROCEDURE — 99207 PR PRENATAL VISIT: CPT | Performed by: OBSTETRICS & GYNECOLOGY

## 2023-11-21 NOTE — TELEPHONE ENCOUNTER
I saw the patient in the office today and discussed initiation of PM NPH. She is in agreement with plan. We had a long discussion about recommendation and safety in pregnancy. When you talk to her tomorrow, can you please send me the prescription for the insulin? I never know the correct orders for all needles and additional supplies. OK to start with standard dose, 0.3U/kg.    Thank you for helping care for this patient!    Melodie Sesay, DO

## 2023-11-21 NOTE — PATIENT INSTRUCTIONS
"Weeks 32 to 34 of Your Pregnancy: Care Instructions    Decide whether you want to bank or donate your baby's umbilical cord blood. If you want to save this blood, you have to arrange for it ahead of time.   Decide about circumcision. Personal, Bahai, or cultural beliefs may play a role in your decision. You get to decide what you want for your baby.     Learn how to ease hemorrhoids.    Get more liquids, fruits, vegetables, and fiber in your diet.  Avoid sitting for too long.  Clean yourself with moist toilet paper. Or try witch hazel pads.  Try ice packs or warm sitz baths for discomfort.  Use hydrocortisone cream for pain or itching.  Ask your doctor about stool softeners.    Consider the benefits of breastfeeding.    It reduces your baby's risk of sudden infant death syndrome (SIDS).   babies are less likely to get certain infections. And they're less likely to be obese or get diabetes later in life.  It can lower your risk of breast and ovarian cancers and osteoporosis.  It saves you money.  Follow-up care is a key part of your treatment and safety. Be sure to make and go to all appointments, and call your doctor if you are having problems. It's also a good idea to know your test results and keep a list of the medicines you take.  Where can you learn more?  Go to [email or web address]://[email or web address].net/[email or web address]  Enter X711 in the search box to learn more about \"Weeks 32 to 34 of Your Pregnancy: Care Instructions.\"  Current as of: July 11, 2023               Content Version: 13.8    8362-3885 [employer].   Care instructions adapted under license by your healthcare professional. If you have questions about a medical condition or this instruction, always ask your healthcare professional. [employer] disclaims any warranty or liability for your use of this information.      You have been provided the Any Day Now: Early Labor at Home document.    Additional copies can be found here:  " [email or web address]  You have been provided the What I'd Wish I'd Known About Giving Birth document.    Additional copies can be found here:  [email or web address]

## 2023-11-21 NOTE — PROGRESS NOTES
"[facility]    Return OB Note    CC: Return OB     Subjective:  Asmita is a 33 year old  at 33w2d   Denies vaginal bleeding, loss of fluid, or regular contractions. Good fetal movement.  Complaints today: None    Objective:  /65 (BP Location: Left arm, Patient Position: Sitting, Cuff Size: Adult Regular)   Pulse 98   Temp 98  F (36.7  C) (Tympanic)   Resp 14   Ht 1.549 m (5' 1\")   Wt 94.3 kg (208 lb)   LMP 2023   BMI 39.30 kg/m      Fundal height: 35cm  FHT: 145bpm    Assessment/Plan:   Encounter Diagnoses   Name Primary?    Prenatal care, subsequent pregnancy in third trimester Yes    Multigravida of advanced maternal age in second trimester     History of  section     Insulin controlled gestational diabetes mellitus (GDM) in third trimester     Class 2 obesity without serious comorbidity with body mass index (BMI) of 38.0 to 38.9 in adult, unspecified obesity type        IUP at 33w2d  -NOB labs normal  -Prenatal screening: declines  -Anatomy US normal (declined L2)  -Mid trimester labs: GDMA  -S/p Flu shot and Tdap     Co-Morbidities/Complications/Concerns:   -GDMA2: Reviewed blood sugars and have coordinated with diabetic team. Fasting sugars are persistently elevated, recommend starting PM NPH. Discussed recommendation today. With initiation of insulin, will require twice weekly BPP testing, this was discussed and ordered today. Will follow growth US. US  EFW 89%, AC 95%.   -AMA: declines L2 and genetics   -History of C section x2: patient is requesting TOLAC consult. Discussed today that her likelihood of success would be lower based on her history as well as current fetal size. She is still requesting referral, discussed scheduling appointment for [facility].  -Obesity BMI 38, also S>D: following US, will have BPPs  -Strict return precautions given    RTC 2 weeks    Melodie Sesay DO    In addition to routine prenatal care, 25 minutes was spent with " discussion and management of gestational diabetes and initiation of insulin therapy and appropriate  surveillance.

## 2023-11-21 NOTE — NURSING NOTE
"Initial /65 (BP Location: Left arm, Patient Position: Sitting, Cuff Size: Adult Regular)   Pulse 98   Temp 98  F (36.7  C) (Tympanic)   Resp 14   Ht 1.549 m (5' 1\")   Wt 94.3 kg (208 lb)   LMP 03/27/2023   BMI 39.30 kg/m   Estimated body mass index is 39.3 kg/m  as calculated from the following:    Height as of this encounter: 1.549 m (5' 1\").    Weight as of this encounter: 94.3 kg (208 lb). .    "

## 2023-11-22 ENCOUNTER — VIRTUAL VISIT (OUTPATIENT)
Dept: EDUCATION SERVICES | Facility: CLINIC | Age: 33
End: 2023-11-22
Payer: COMMERCIAL

## 2023-11-22 DIAGNOSIS — O24.419 GESTATIONAL DIABETES MELLITUS (GDM) IN THIRD TRIMESTER, GESTATIONAL DIABETES METHOD OF CONTROL UNSPECIFIED: Primary | ICD-10-CM

## 2023-11-22 PROCEDURE — G0108 DIAB MANAGE TRN  PER INDIV: HCPCS | Mod: 95 | Performed by: DIETITIAN, REGISTERED

## 2023-11-22 RX ORDER — INSULIN HUMAN 100 [IU]/ML
10 INJECTION, SUSPENSION SUBCUTANEOUS AT BEDTIME
Qty: 15 ML | Refills: 1 | Status: SHIPPED | OUTPATIENT
Start: 2023-11-22 | End: 2023-11-29

## 2023-11-22 NOTE — PROGRESS NOTES
Gestational Diabetes Follow-up    Subjective/Objective:  Type of Service: Telephone Visit  Originating Location (Patient Location): Home  Distant Location (Provider Location): Offsite  Mode of Communication:  Telephone  Telephone Visit Start Time: 11  Telephone Visit End Time (telephone visit stop time): 1133  How would patient like to obtain AVS? Email     Gestational diabetes is being managed with diet and activity    Estimated Date of Delivery: Jan 7, 2024    Ketones were negative yesterday     Date Breakfast Breakfast Lunch Dinner    Before After After After   11/20 102 123 139 118   11/21 113 96 147 152 (pie)   11/22 97        Ketones 80   Ketones  - negative   Ketones - negative     [email or web address]   (email was wrong in demographics and updated to this)       Assessment:  Follow-up from 2 days ago.  This visit was set to review how to do an insulin injection in detail.  Reviewed insulin injection technique (new needle each time, priming, insulin storage and 14 day use of each pen, cloudy versus clear insulin,  90 degree insertion angle, rotating  location of injection,  holding pen needle in skin for 10 seconds.  Pt verbalized understanding of above technique.   Email updated to send instructions & video as the first email failed due to demographics having the wrong information.  Bedtime is usually 10pm - try to take insulin between 9-11pm.      Plan/Response:  Insulin to pharmacy - approved 11/21  Follow up on 11/29  Continue with positive diet & lifestyle changes       Liliana Castillo RD, LD, [facility]  Diabetes Education  Time spent: 33 minutes

## 2023-11-22 NOTE — Clinical Note
11/22/2023         RE: Asmita Frye  [address] [facility] [address]        Dear Colleague,    Thank you for referring your patient, Asmita Frye, to the [facility]. Please see a copy of my visit note below.    Gestational Diabetes Follow-up    Subjective/Objective:  Type of Service: Telephone Visit  Originating Location (Patient Location): Home  Distant Location (Provider Location): Offsite  Mode of Communication:  Telephone  Telephone Visit Start Time: 11  Telephone Visit End Time (telephone visit stop time): 1133  How would patient like to obtain AVS? Email     Gestational diabetes is being managed with diet and activity    Estimated Date of Delivery: Jan 7, 2024    Ketones were negative yesterday     Date Breakfast Breakfast Lunch Dinner    Before After After After   11/20 102 123 139 118   11/21 113 96 147 152 (pie)   11/22 97        Ketones 80   Ketones  - negative   Ketones - negative     [email or web address]   (email was wrong in demographics and updated to this)       Assessment:  Follow-up from 2 days ago.  This visit was set to review how to do an insulin injection in detail.  Reviewed insulin injection technique (new needle each time, priming, insulin storage and 14 day use of each pen, cloudy versus clear insulin,  90 degree insertion angle, rotating  location of injection,  holding pen needle in skin for 10 seconds.  Pt verbalized understanding of above technique.   Email updated to send instructions & video as the first email failed due to demographics having the wrong information.  Bedtime is usually 10pm - try to take insulin between 9-11pm.      Plan/Response:  Insulin to pharmacy - approved 11/21  Follow up on 11/29  Continue with positive diet & lifestyle changes       Liliana Castillo RD, LD, [facility]  Diabetes Education  Time spent: 33 minutes       Email:     Samir Tian,     Thank you for talking today and keep up the great work!    "On this website please watch the following video for \"Humulin N Kwikpen\"    [email or web address]    [email or web address]      Written instructions:   1. Take NPH insulin at bedtime.  NPH is also called Humulin N Kwikpen (made by Yesi Rosario).   Gently roll the pen between your hands, the insulin is cloudy and should be gently mixed each time before use.       - Do a 2 unit \"prime\" before each injection, be sure a stream of insulin comes out of the needle before you give your injection.  ?  - Dial up to prescribed dose (10 units at bedtime) After you inject, hold the needle under the skin to the count of 10 to be sure all of the insulin goes in.   ?  - Rotate injection sites, keeping at least 1 inch apart from last injection site, avoid scars.  Do not inject on the baby bump.  Inject into the side of abdomen, outer thighs or back of arm.  Insulin is meant to be injected into fat tissue, make sure you can pinch and inch of fat tissue where you inject.   ?  2. Pen - Use a new pen needle for each injection. Always remove pen needle from the insulin pen after use and do not store insulin pens with the needle on the pen.   ?  3. Store insulin you are not using in the refrigerator (do not freeze). Take new insulin out of the refrigerator a few hours prior to use to bring to room temperature.   ?  4. Always keep new unused  pens in the refrigerator.    With the current pen that you are using you can keep it at room temperature away from direct sunlight.  Do not use the opened insulin after this time has passed, even if there is still medicine inside.   Humulin N Kwikpens can be kept at room temperature for 14 days after first use  ?  5. Always carry your blood sugar meter and a sugar source like glucose tablets with you in case of a low blood sugar. Treat a low blood sugar (less than 70) with 15 grams of carbohydrate (1 carb choice). Wait 15 minutes, recheck blood " sugar. If blood sugar is still below 70, repeat the treatment.      Please let us know if you have any questions.     Thanks!  Flora Castillo RD, LD, [facility]  Ambulatory Adult Diabetes Education  [email or web address]   Phone: [phone]   Gender pronouns: she/her  Employed by [facility]

## 2023-11-22 NOTE — PROGRESS NOTES
"Email:     Samir Tian,     Thank you for talking today and keep up the great work!   On this website please watch the following video for \"Humulin N Kwikpen\"    [email or web address]    [email or web address]      Written instructions:   1. Take NPH insulin at bedtime.  NPH is also called Humulin N Kwikpen (made by Yesi Rosario).   Gently roll the pen between your hands, the insulin is cloudy and should be gently mixed each time before use.       - Do a 2 unit \"prime\" before each injection, be sure a stream of insulin comes out of the needle before you give your injection.  ?  - Dial up to prescribed dose (10 units at bedtime) After you inject, hold the needle under the skin to the count of 10 to be sure all of the insulin goes in.   ?  - Rotate injection sites, keeping at least 1 inch apart from last injection site, avoid scars.  Do not inject on the baby bump.  Inject into the side of abdomen, outer thighs or back of arm.  Insulin is meant to be injected into fat tissue, make sure you can pinch and inch of fat tissue where you inject.   ?  2. Pen - Use a new pen needle for each injection. Always remove pen needle from the insulin pen after use and do not store insulin pens with the needle on the pen.   ?  3. Store insulin you are not using in the refrigerator (do not freeze). Take new insulin out of the refrigerator a few hours prior to use to bring to room temperature.   ?  4. Always keep new unused  pens in the refrigerator.    With the current pen that you are using you can keep it at room temperature away from direct sunlight.  Do not use the opened insulin after this time has passed, even if there is still medicine inside.   Humulin N Kwikpens can be kept at room temperature for 14 days after first use  ?  5. Always carry your blood sugar meter and a sugar source like glucose tablets with you in case of a low blood sugar. Treat a low blood sugar (less " than 70) with 15 grams of carbohydrate (1 carb choice). Wait 15 minutes, recheck blood sugar. If blood sugar is still below 70, repeat the treatment.      Please let us know if you have any questions.     Thanks!  Flora Castillo RD, LD, [facility]  Ambulatory Adult Diabetes Education  [email or web address]   Phone: [phone]   Gender pronouns: she/her  Employed by [facility] [employer]

## 2023-11-29 ENCOUNTER — VIRTUAL VISIT (OUTPATIENT)
Dept: EDUCATION SERVICES | Facility: CLINIC | Age: 33
End: 2023-11-29
Payer: COMMERCIAL

## 2023-11-29 DIAGNOSIS — O24.419 GESTATIONAL DIABETES MELLITUS (GDM) IN THIRD TRIMESTER, GESTATIONAL DIABETES METHOD OF CONTROL UNSPECIFIED: Primary | ICD-10-CM

## 2023-11-29 PROCEDURE — G0108 DIAB MANAGE TRN  PER INDIV: HCPCS | Mod: GT | Performed by: DIETITIAN, REGISTERED

## 2023-11-29 RX ORDER — INSULIN HUMAN 100 [IU]/ML
12 INJECTION, SUSPENSION SUBCUTANEOUS AT BEDTIME
Qty: 15 ML | Refills: 1 | Status: SHIPPED | OUTPATIENT
Start: 2023-11-29 | End: 2023-12-04

## 2023-11-29 NOTE — PROGRESS NOTES
Gestational Diabetes Follow-up    Subjective/Objective:  Type of Service: Telephone Visit  Originating Location (Patient Location): Home  Distant Location (Provider Location): Offsite  Mode of Communication:  Telephone  Telephone Visit Start Time:  11 :00  Telephone Visit End Time (telephone visit stop time): 11:30   - [id number]    Gestational diabetes is being managed with diet and activity and NPH     Taking diabetes medications: yes:     Diabetes Medication(s)       Insulin       insulin NPH (HUMULIN N KWIKPEN) 100 UNIT/ML injection    Inject 10 Units Subcutaneous at bedtime            Estimated Date of Delivery: Jan 7, 2024    Date Breakfast Breakfast Lunch Dinner    Before After After After   11/25 98 174 149 159   11/26 99 148 116 120   11/27 122 175 133 125   11/28 103 136 114 170   11/29 92 130         Assessment:  Ketones: Improved over the last 5 days:  Negative, 15, negative, 15, 5   Fasting blood glucoses: 20% in target.  After breakfast: 40% in target.  After lunch: 75% in target.  After dinner: 50% in target.    Picked up the insulin on Saturday and was able to start it.  Injection went OK and used the arm, but one of the shots hurt more.  Recommended trying the side / above waist/belt line on the side.  Talked about injection stephanie - not on the baby bump, but into fat tissue on the side.    Chicken soup + tortillas caused the 170 at dinner and was surprised by this.   Talked bout clean hands with checking for accuracy, adjusting & experimenting with carbohydrates, and light movement after meals.  Does a good job with washing & drying hands before blood sugar checks.  Is walking around after meals quite a bit.       With new schedule is getting up early at 5am but not feeling like eating breakfast this early.  She is asking about adding a snack in before breakfast versus after.  It is a long time .  Then will eat normal breakfast around 8-9am.       Plan/Response:  Recommend increase to insulin  -NPH from 0-0-0-10 to 0-0-0-12  Follow-up on Monday.  Continue with positive diet & lifestyle changes   Make a note of what foods are causing post prandial readings above 140       Liliana Castillo RD, LD, [facility]  Diabetes Education  Time spent:  30 minutes     Any diabetes medication dose changes were made via the CDE Protocol and Collaborative Practice Agreement with the patient's referring provider. A copy of this encounter was shared with the provider.

## 2023-11-29 NOTE — LETTER
11/29/2023         RE: Asmita Frye  [address] [facility] [address]        Dear Colleague,    Thank you for referring your patient, Asmita Frye, to the [facility] DIABETES EDUCATION. Please see a copy of my visit note below.    Gestational Diabetes Follow-up    Subjective/Objective:  Type of Service: Telephone Visit  Originating Location (Patient Location): Home  Distant Location (Provider Location): Offsite  Mode of Communication:  Telephone  Telephone Visit Start Time:  11 :00  Telephone Visit End Time (telephone visit stop time): 11:30   - [id number]    Gestational diabetes is being managed with diet and activity and NPH     Taking diabetes medications: yes:     Diabetes Medication(s)       Insulin       insulin NPH (HUMULIN N KWIKPEN) 100 UNIT/ML injection    Inject 10 Units Subcutaneous at bedtime            Estimated Date of Delivery: Jan 7, 2024    Date Breakfast Breakfast Lunch Dinner    Before After After After   11/25 98 174 149 159   11/26 99 148 116 120   11/27 122 175 133 125   11/28 103 136 114 170   11/29 92 130         Assessment:  Ketones: Improved over the last 5 days:  Negative, 15, negative, 15, 5   Fasting blood glucoses: 20% in target.  After breakfast: 40% in target.  After lunch: 75% in target.  After dinner: 50% in target.    Picked up the insulin on Saturday and was able to start it.  Injection went OK and used the arm, but one of the shots hurt more.  Recommended trying the side / above waist/belt line on the side.  Talked about injection stephanie - not on the baby bump, but into fat tissue on the side.    Chicken soup + tortillas caused the 170 at dinner and was surprised by this.   Talked bout clean hands with checking for accuracy, adjusting & experimenting with carbohydrates, and light movement after meals.  Does a good job with washing & drying hands before blood sugar checks.  Is walking around after meals quite a bit.       With new  schedule is getting up early at 5am but not feeling like eating breakfast this early.  She is asking about adding a snack in before breakfast versus after.  It is a long time .  Then will eat normal breakfast around 8-9am.       Plan/Response:  Recommend increase to insulin -NPH from 0-0-0-10 to 0-0-0-12  Follow-up on Monday.  Continue with positive diet & lifestyle changes   Make a note of what foods are causing post prandial readings above 140       Liliana Castillo RD, LD, [facility]  Diabetes Education  Time spent:  30 minutes     Any diabetes medication dose changes were made via the CDE Protocol and Collaborative Practice Agreement with the patient's referring provider. A copy of this encounter was shared with the provider.

## 2023-12-04 ENCOUNTER — VIRTUAL VISIT (OUTPATIENT)
Dept: EDUCATION SERVICES | Facility: CLINIC | Age: 33
End: 2023-12-04
Payer: COMMERCIAL

## 2023-12-04 DIAGNOSIS — O24.419 GESTATIONAL DIABETES MELLITUS (GDM) IN THIRD TRIMESTER, GESTATIONAL DIABETES METHOD OF CONTROL UNSPECIFIED: Primary | ICD-10-CM

## 2023-12-04 PROCEDURE — 99207 PR NONPHYSICIAN TELEPHONE ASSESSMENT 21-30 MIN: CPT

## 2023-12-04 RX ORDER — INSULIN HUMAN 100 [IU]/ML
13 INJECTION, SUSPENSION SUBCUTANEOUS AT BEDTIME
Qty: 15 ML | Refills: 0 | Status: SHIPPED | OUTPATIENT
Start: 2023-12-04 | End: 2023-12-11

## 2023-12-04 NOTE — PATIENT INSTRUCTIONS
Test glucose 4 times per day:              Fasting (when you first awake for the day): 95 mg/dL or below              1 hour after breakfast: 140 mg/dL or below              1 hour after lunch: 140 mg/dL or below              1 hour after dinner: 140 mg/dL or below                 Please bring your meter and log book to all appointments                 If you miss 1 hour after meal test, test 2 hours after the meal.  Goal 2 hours after is 120 mg/dL or below.      2.  Check your urine ketones once a day, when you first awake for the day until they are negative to trace for 7 days in a row.  Then decrease and check once a week.      3.  For your bedtime snack: 1 slice bread with peanut butter OR 1 cup milk OR 1 slice of bread with a prepared egg     4.  Aim for 20-30 minutes of activity most days of the week (with the okay of your OB provider).      5.  Increase NPH to 13 units once a day at bedtime.     6. Keep something with you for treating a low blood sugar (below 80 mg/dL).  If your blood sugar is low, eat/drink ONE of the following:   -1/2 cup juice  -1/2 cup regular soda  -1 package fruit snacks  -4 glucose tablets  Then, wait 15 minutes and re-check blood sugar.  If it is not above 70 mg/dL, repeat the above.    7.  Follow up: Thursday, December 7th at 10:45 AM with Marisol via phone     8.  Call Diabetes Education at [phone] or send a [employer] message with:              -questions or concerns              -ketones that are small, moderate, or large              -3 or more blood sugars above target in a 7 day period   -any low blood sugars    Thank you,   Marisol Strange, MPH, RD, CDCES, LD 12/4/2023

## 2023-12-04 NOTE — LETTER
12/4/2023         RE: Asmita Frye  [address] [facility] [address]        Dear Colleague,    Thank you for referring your patient, Asmita Frye, to the [facility]. Please see a copy of my visit note below.    Diabetes and Pregnancy Follow-up  Type of Service: Telephone Visit/ 28 minutes     Originating Location (Patient Location): Home  Distant Location (Provider Location): [facility]  Mode of Communication:  Telephone  D[employer] phone  #[id number] used for the duration of the visit     Telephone Visit Start Time: 9:35 AM, 9:47 AM  Telephone Visit End Time (telephone visit stop time): 9:45 AM, 10:05 AM  *times above reflect disconnection on patient end and subsequent re-connection     How would patient like to obtain AVS? MyChart      Subjective/Objective:    Asmita Frye was called for a scheduled BG review. Last date of communication was: 11/29.    Gestational diabetes is being managed with diet, activity, and medications    Taking diabetes medications: yes:     Diabetes Medication(s)       Insulin       insulin NPH (HUMULIN N KWIKPEN) 100 UNIT/ML injection    Inject 12 Units Subcutaneous at bedtime            Estimated Date of Delivery: Jan 7, 2024    Blood Glucose/Ketone Log:    Date Ketones Fasting Post Breakfast Post Lunch Post Supper   11/30 negative 105 132 140 147   12/1 trace 94 122 125 140   12/2 Moderate 95 98 139 136   12/3 small 99 140 121 124   12/4 Moderate 89      (Testing 1 hour post meals)    Supper at 8-8:30 PM last night, usually 7-7:30 PM  To bed: 10 PM, last night = lettuce & water    Assessment:  Ketones: negative x1, trace x1, small x1, moderate x2.   Fasting blood glucoses: 60% in target.  After breakfast: 100% in target.  Before lunch: n/a% in target.  After lunch: 100% in target.  Before dinner: n/a% in target.  After dinner: 75% in target.    Focused on addressing elevated urine ketones today.  Last night patient reports she  had supper at 8-8:30 PM and then went to bed at 10 PM.  Reports HS snack of a smoothie which was solely lettuce and water.  Discussed the need for carbohydrate + protein at HS snack.  Had patient practice reading a food label for carbohydrate and patient struggled.  Reviewed reading a label for total carbohydrates.  Provided patient specific examples of carbohydrate + protein to have at HS.      Plan/Response:  Test glucose 4 times per day:              Fasting (when you first awake for the day): 95 mg/dL or below              1 hour after breakfast: 140 mg/dL or below              1 hour after lunch: 140 mg/dL or below              1 hour after dinner: 140 mg/dL or below                 Please bring your meter and log book to all appointments                 If you miss 1 hour after meal test, test 2 hours after the meal.  Goal 2 hours after is 120 mg/dL or below.      2.  Check your urine ketones once a day, when you first awake for the day until they are negative to trace for 7 days in a row.  Then decrease and check once a week.      3.  For your bedtime snack: 1 slice bread with peanut butter OR 1 cup milk OR 1 slice of bread with a prepared egg     4.  Aim for 20-30 minutes of activity most days of the week (with the okay of your OB provider).      5.  Increase NPH to 13 units once a day at bedtime.     6. Keep something with you for treating a low blood sugar (below 80 mg/dL).  If your blood sugar is low, eat/drink ONE of the following:   -1/2 cup juice  -1/2 cup regular soda  -1 package fruit snacks  -4 glucose tablets  Then, wait 15 minutes and re-check blood sugar.  If it is not above 70 mg/dL, repeat the above.    7.  Follow up: Thursday, December 7th at 10:45 AM with Marisol via phone     8.  Call Diabetes Education at [phone] or send a [employer] message with:              -questions or concerns              -ketones that are small, moderate, or large              -3 or more blood sugars above target in  a 7 day period   -any low blood sugars    Marisol Strange, MPH, RD, CDCES, LD 12/4/2023    Time Spent: 28 minutes    Any diabetes medication dose changes were made via the CDE Protocol and Collaborative Practice Agreement with the patient's referring provider. A copy of this encounter was shared with the provider.

## 2023-12-04 NOTE — PROGRESS NOTES
Diabetes and Pregnancy Follow-up  Type of Service: Telephone Visit/ 28 minutes     Originating Location (Patient Location): Home  Distant Location (Provider Location): [facility] [place]  Mode of Communication:  Telephone  [place]ian phone  #[id number] used for the duration of the visit     Telephone Visit Start Time: 9:35 AM, 9:47 AM  Telephone Visit End Time (telephone visit stop time): 9:45 AM, 10:05 AM  *times above reflect disconnection on patient end and subsequent re-connection     How would patient like to obtain AVS? MyChart      Subjective/Objective:    Asmita Frye was called for a scheduled BG review. Last date of communication was: 11/29.    Gestational diabetes is being managed with diet, activity, and medications    Taking diabetes medications: yes:     Diabetes Medication(s)       Insulin       insulin NPH (HUMULIN N KWIKPEN) 100 UNIT/ML injection    Inject 12 Units Subcutaneous at bedtime            Estimated Date of Delivery: Jan 7, 2024    Blood Glucose/Ketone Log:    Date Ketones Fasting Post Breakfast Post Lunch Post Supper   11/30 negative 105 132 140 147   12/1 trace 94 122 125 140   12/2 Moderate 95 98 139 136   12/3 small 99 140 121 124   12/4 Moderate 89      (Testing 1 hour post meals)    Supper at 8-8:30 PM last night, usually 7-7:30 PM  To bed: 10 PM, last night = lettuce & water    Assessment:  Ketones: negative x1, trace x1, small x1, moderate x2.   Fasting blood glucoses: 60% in target.  After breakfast: 100% in target.  Before lunch: n/a% in target.  After lunch: 100% in target.  Before dinner: n/a% in target.  After dinner: 75% in target.    Focused on addressing elevated urine ketones today.  Last night patient reports she had supper at 8-8:30 PM and then went to bed at 10 PM.  Reports HS snack of a smoothie which was solely lettuce and water.  Discussed the need for carbohydrate + protein at HS snack.  Had patient practice reading a food label for carbohydrate and  patient struggled.  Reviewed reading a label for total carbohydrates.  Provided patient specific examples of carbohydrate + protein to have at .      Plan/Response:  Test glucose 4 times per day:              Fasting (when you first awake for the day): 95 mg/dL or below              1 hour after breakfast: 140 mg/dL or below              1 hour after lunch: 140 mg/dL or below              1 hour after dinner: 140 mg/dL or below                 Please bring your meter and log book to all appointments                 If you miss 1 hour after meal test, test 2 hours after the meal.  Goal 2 hours after is 120 mg/dL or below.      2.  Check your urine ketones once a day, when you first awake for the day until they are negative to trace for 7 days in a row.  Then decrease and check once a week.      3.  For your bedtime snack: 1 slice bread with peanut butter OR 1 cup milk OR 1 slice of bread with a prepared egg     4.  Aim for 20-30 minutes of activity most days of the week (with the okay of your OB provider).      5.  Increase NPH to 13 units once a day at bedtime.     6. Keep something with you for treating a low blood sugar (below 80 mg/dL).  If your blood sugar is low, eat/drink ONE of the following:   -1/2 cup juice  -1/2 cup regular soda  -1 package fruit snacks  -4 glucose tablets  Then, wait 15 minutes and re-check blood sugar.  If it is not above 70 mg/dL, repeat the above.    7.  Follow up: Thursday, December 7th at 10:45 AM with Marisol via phone     8.  Call Diabetes Education at [phone] or send a [employer] message with:              -questions or concerns              -ketones that are small, moderate, or large              -3 or more blood sugars above target in a 7 day period   -any low blood sugars    Marisol Strange, MPH, RD, CDCES, LD 12/4/2023    Time Spent: 28 minutes    Any diabetes medication dose changes were made via the CDE Protocol and Collaborative Practice Agreement with the patient's  referring provider. A copy of this encounter was shared with the provider.

## 2023-12-06 ENCOUNTER — PRENATAL OFFICE VISIT (OUTPATIENT)
Dept: OBGYN | Facility: CLINIC | Age: 33
End: 2023-12-06
Payer: COMMERCIAL

## 2023-12-06 ENCOUNTER — PREP FOR PROCEDURE (OUTPATIENT)
Dept: OBGYN | Facility: CLINIC | Age: 33
End: 2023-12-06

## 2023-12-06 ENCOUNTER — TELEPHONE (OUTPATIENT)
Dept: OBGYN | Facility: CLINIC | Age: 33
End: 2023-12-06

## 2023-12-06 VITALS
SYSTOLIC BLOOD PRESSURE: 118 MMHG | DIASTOLIC BLOOD PRESSURE: 71 MMHG | HEART RATE: 104 BPM | WEIGHT: 211.2 LBS | HEIGHT: 61 IN | BODY MASS INDEX: 39.88 KG/M2 | TEMPERATURE: 97.6 F | RESPIRATION RATE: 14 BRPM

## 2023-12-06 DIAGNOSIS — O24.414 GESTATIONAL DIABETES MELLITUS (GDM) REQUIRING INSULIN: ICD-10-CM

## 2023-12-06 DIAGNOSIS — O09.522 MULTIGRAVIDA OF ADVANCED MATERNAL AGE IN SECOND TRIMESTER: ICD-10-CM

## 2023-12-06 DIAGNOSIS — O34.219 HISTORY OF CESAREAN SECTION COMPLICATING PREGNANCY: Primary | ICD-10-CM

## 2023-12-06 DIAGNOSIS — Z98.891 HISTORY OF CESAREAN SECTION: ICD-10-CM

## 2023-12-06 DIAGNOSIS — Z34.83 PRENATAL CARE, SUBSEQUENT PREGNANCY IN THIRD TRIMESTER: Primary | ICD-10-CM

## 2023-12-06 PROCEDURE — 59025 FETAL NON-STRESS TEST: CPT | Performed by: OBSTETRICS & GYNECOLOGY

## 2023-12-06 PROCEDURE — 99207 PR PRENATAL VISIT: CPT | Performed by: OBSTETRICS & GYNECOLOGY

## 2023-12-06 RX ORDER — CITRIC ACID/SODIUM CITRATE 334-500MG
30 SOLUTION, ORAL ORAL
Status: CANCELLED | OUTPATIENT
Start: 2023-12-06

## 2023-12-06 RX ORDER — SODIUM CHLORIDE, SODIUM LACTATE, POTASSIUM CHLORIDE, CALCIUM CHLORIDE 600; 310; 30; 20 MG/100ML; MG/100ML; MG/100ML; MG/100ML
INJECTION, SOLUTION INTRAVENOUS CONTINUOUS
Status: CANCELLED | OUTPATIENT
Start: 2023-12-06

## 2023-12-06 RX ORDER — CEFAZOLIN SODIUM 2 G/100ML
2 INJECTION, SOLUTION INTRAVENOUS SEE ADMIN INSTRUCTIONS
Status: CANCELLED | OUTPATIENT
Start: 2023-12-06

## 2023-12-06 RX ORDER — OXYTOCIN/0.9 % SODIUM CHLORIDE 30/500 ML
100-340 PLASTIC BAG, INJECTION (ML) INTRAVENOUS CONTINUOUS PRN
Status: CANCELLED | OUTPATIENT
Start: 2023-12-06

## 2023-12-06 RX ORDER — OXYTOCIN 10 [USP'U]/ML
10 INJECTION, SOLUTION INTRAMUSCULAR; INTRAVENOUS
Status: CANCELLED | OUTPATIENT
Start: 2023-12-06

## 2023-12-06 RX ORDER — MISOPROSTOL 200 UG/1
800 TABLET ORAL
Status: CANCELLED | OUTPATIENT
Start: 2023-12-06

## 2023-12-06 RX ORDER — ACETAMINOPHEN 325 MG/1
975 TABLET ORAL ONCE
Status: CANCELLED | OUTPATIENT
Start: 2023-12-06 | End: 2023-12-06

## 2023-12-06 RX ORDER — LIDOCAINE 40 MG/G
CREAM TOPICAL
Status: CANCELLED | OUTPATIENT
Start: 2023-12-06

## 2023-12-06 RX ORDER — METHYLERGONOVINE MALEATE 0.2 MG/ML
200 INJECTION INTRAVENOUS
Status: CANCELLED | OUTPATIENT
Start: 2023-12-06

## 2023-12-06 RX ORDER — MISOPROSTOL 200 UG/1
400 TABLET ORAL
Status: CANCELLED | OUTPATIENT
Start: 2023-12-06

## 2023-12-06 RX ORDER — OXYTOCIN/0.9 % SODIUM CHLORIDE 30/500 ML
340 PLASTIC BAG, INJECTION (ML) INTRAVENOUS CONTINUOUS PRN
Status: CANCELLED | OUTPATIENT
Start: 2023-12-06

## 2023-12-06 RX ORDER — TRANEXAMIC ACID 10 MG/ML
1 INJECTION, SOLUTION INTRAVENOUS EVERY 30 MIN PRN
Status: CANCELLED | OUTPATIENT
Start: 2023-12-06

## 2023-12-06 RX ORDER — CEFAZOLIN SODIUM 2 G/100ML
2 INJECTION, SOLUTION INTRAVENOUS
Status: CANCELLED | OUTPATIENT
Start: 2023-12-06

## 2023-12-06 RX ORDER — CARBOPROST TROMETHAMINE 250 UG/ML
250 INJECTION, SOLUTION INTRAMUSCULAR
Status: CANCELLED | OUTPATIENT
Start: 2023-12-06

## 2023-12-06 NOTE — PROGRESS NOTES
"[facility]    Return OB Note    CC: Return OB     Subjective:  Asmita is a 33 year old  at 35w3d   Denies vaginal bleeding, loss of fluid, or regular contractions. Good fetal movement.  Complaints today: None    Objective:  /71 (BP Location: Left arm, Patient Position: Sitting, Cuff Size: Adult Regular)   Pulse 104   Temp 97.6  F (36.4  C) (Tympanic)   Resp 14   Ht 1.549 m (5' 1\")   Wt 95.8 kg (211 lb 3.2 oz)   LMP 2023   BMI 39.91 kg/m      NST:   Indication: GDMA2  Baseline: 130 bpm  Variability: moderate  Accelerations: present  Decelerations: absent  TOCO: none  Impression: Reactive NST      Assessment/Plan:   Encounter Diagnoses   Name Primary?    Gestational diabetes mellitus (GDM) requiring insulin     History of  section     Multigravida of advanced maternal age in second trimester     Prenatal care, subsequent pregnancy in third trimester Yes       IUP at 35w3d  -NOB labs normal  -Prenatal screening: declines  -Anatomy US normal (declined L2)  -Mid trimester labs: GDMA  -S/p Flu shot and Tdap     Co-Morbidities/Complications/Concerns:   -GDMA2: taking PM NPH, have been titrating for improved control. BPPs have been ordered, but patient has not yet initiated. Follow growth US. US  EFW 89%, AC 95%. Repeat is scheduled for this week. She does not plan to complete BPPs. NST performed today. Will plan for earlier delivery based on lack of  surveillance. C section scheduled for 23.  -AMA: declines L2 and genetics   -History of C section x2: have discussed option for TOLAC consult vs. Repeat C section. She has made the decision to move forward with repeat C section, scheduled for . She is considering tubal ligation. This pregnancy has been very difficult. Diagnosis of diabetes has been scary. Discussed permanent nature of procedure. She has private insurance, no need for federal tubal papers.  -Obesity BMI 38, also S>D: following " US, will have BPPs  -Strict return precautions given    RTC 1 weeks    Melodie Sesay DO

## 2023-12-06 NOTE — PATIENT INSTRUCTIONS
Weeks 34 to 36 of Your Pregnancy: Care Instructions  Your belly has grown quite large. It's almost time to give birth! Your baby's lungs are almost ready to breathe air. The skull bones are firm enough to protect your baby's head. But they're soft enough to move down through the birth canal.    You might be wondering what to expect during labor. Because each birth is different, there's no way to know exactly what childbirth will be like for you. Talk to your doctor or midwife about any concerns you have.   You'll probably have a test for group B streptococcus (GBS). GBS is bacteria that can live in the vagina and rectum. GBS can make your baby sick after birth. If you test positive, you'll get antibiotics during labor.     Choose what type of pain relief you would like during labor.  You can choose from a few types, including medicine and non-medicine options. You may want to use several types of pain relief.     Know how medicines can help with pain during labor.  Some medicines lower anxiety and help with some of the pain. Others make your lower body numb so that you will feel less pain.     Tell your doctor about your pain medicine choice.  Do this before you start labor or very early in your labor. You may be able to change your mind during labor.     Learn about the stages of labor.    The first stage includes the early (latent) and active phases of labor. Contractions start in early labor. During active labor, contractions get stronger, last longer, and happen more often. And the cervix opens more rapidly.  The second stage starts when you're ready to push. During this stage, your baby is born.  During the third stage, you'll usually have a few more contractions to push out the placenta.   Follow-up care is a key part of your treatment and safety. Be sure to make and go to all appointments, and call your doctor if you are having problems. It's also a good idea to know your test results and keep a list of the  "medicines you take.  Where can you learn more?  Go to https://www.[employer].net/patiented  Enter B912 in the search box to learn more about \"Weeks 34 to 36 of Your Pregnancy: Care Instructions.\"  Current as of: 2023               Content Version: 13.8    8296-1836 [facility].   Care instructions adapted under license by your healthcare professional. If you have questions about a medical condition or this instruction, always ask your healthcare professional. [facility] disclaims any warranty or liability for your use of this information.      Group B Strep During Pregnancy: Care Instructions  Overview     Group B strep infection is caused by a type of bacteria. It's a different kind of bacteria than the kind that causes strep throat.  You may have this kind of bacteria in your body. Sometimes it may cause an infection, but most of the time it doesn't make you sick or cause symptoms. But if you pass the bacteria to your baby during the birth, it can cause serious health problems for your baby.  If you have this bacteria in your body, you will get antibiotics when you are in labor. Antibiotics help prevent problems for a  baby.  After birth, doctors will watch and may test your baby. If your baby tests positive for Group B strep, your baby will get antibiotics.  If you plan to breastfeed your baby, don't worry. It will be safe to breastfeed.  Follow-up care is a key part of your treatment and safety. Be sure to make and go to all appointments, and call your doctor if you are having problems. It's also a good idea to know your test results and keep a list of the medicines you take.  How can you care for yourself at home?  If your doctor has prescribed antibiotics, take them as directed. Do not stop taking them just because you feel better. You need to take the full course of antibiotics.  Tell your doctor if you are allergic to any antibiotic.  If you go into labor, or your " "water breaks, go to the hospital. Your doctor will give you antibiotics to help protect your baby from infection.  Tell the doctors and nurses if you have an allergy to penicillin.  Tell the doctors and nurses at the hospital that you tested positive for group B strep.  When should you call for help?   Call your doctor now or seek immediate medical care if:    You have symptoms of a urinary tract infection. These may include:  Pain or burning when you urinate.  A frequent need to urinate without being able to pass much urine.  Pain in the flank, which is just below the rib cage and above the waist on either side of the back.  Blood in your urine.  A fever.     You think you are in labor or your water has broken.     You have pain in your belly or pelvis.   Watch closely for changes in your health, and be sure to contact your doctor if you have any problems.  Where can you learn more?  Go to https://www.[employer]/patiented  Enter M001 in the search box to learn more about \"Group B Strep During Pregnancy: Care Instructions.\"  Current as of: June 13, 2023               Content Version: 13.8    3659-0777 [employer].   Care instructions adapted under license by your healthcare professional. If you have questions about a medical condition or this instruction, always ask your healthcare professional. [employer] disclaims any warranty or liability for your use of this information.      Circumcision in Infants: What to Expect at Home  Your Child's Recovery  After circumcision, your baby's penis may look red and swollen. It may have petroleum jelly and gauze on it. The gauze will likely come off when your baby urinates. Follow your doctor's directions about whether to put clean gauze back on your baby's penis or to leave the gauze off. If you need to remove gauze from the penis, use warm water to soak the gauze and gently loosen it.  The doctor may have used a Plastibell device to do the " circumcision. If so, your baby will have a plastic ring around the head of the penis. The ring should fall off by itself in 10 to 12 days.  A thin, yellow film may form over the area the day after the procedure. This is part of the normal healing process. It should go away in a few days.  Your baby may seem fussy while the area heals. It may hurt for your baby to urinate. This pain often gets better in 3 or 4 days. But it may last for up to 2 weeks.  Even though your baby's penis will likely start to feel better after 3 or 4 days, it may look worse. The penis often starts to look like it's getting better after about 7 to 10 days.  This care sheet gives you a general idea about how long it will take for your child to recover. But each child recovers at a different pace. Follow the steps below to help your child get better as quickly as possible.  How can you care for your child at home?  Activity    Let your baby rest as much as possible. Sleeping will help with recovery.     You can give your baby a sponge bath the day after surgery. Ask your doctor when it is okay to give your baby a bath.   Medicines    Your doctor will tell you if and when your child can restart any medicines. The doctor will also give you instructions about your child taking any new medicines.     Your doctor may recommend giving your baby acetaminophen (Tylenol) to help with pain after the procedure. Be safe with medicines. Give your child medicines exactly as prescribed. Call your doctor if you think your child is having a problem with a medicine.     Do not give your child two or more pain medicines at the same time unless the doctor told you to. Many pain medicines have acetaminophen, which is Tylenol. Too much acetaminophen (Tylenol) can be harmful.   Circumcision care    Always wash your hands before and after touching the circumcision area.     Gently wash your baby's penis with plain, warm water after each diaper change, and pat it dry.  "Do not use soap. Don't use hydrogen peroxide or alcohol. They can slow healing.     Do not try to remove the film that forms on the penis. The film will go away on its own.     Put plenty of petroleum jelly (such as Vaseline) on the circumcision area during each diaper change. This will prevent your baby's penis from sticking to the diaper while it heals.     Fasten your baby's diapers loosely so that there is less pressure on the penis while it heals.   Follow-up care is a key part of your child's treatment and safety. Be sure to make and go to all appointments, and call your doctor if your child is having problems. It's also a good idea to know your child's test results and keep a list of the medicines your child takes.  When should you call for help?   Call your doctor now or seek immediate medical care if:    Your baby has a fever over 100.4 F.     Your baby is extremely fussy or irritable, has a high-pitched cry, or refuses to eat.     Your baby does not have a wet diaper within 12 hours after the circumcision.     You find a spot of bleeding larger than a 2-inch Teller from the incision.     Your baby has signs of infection. Signs may include severe swelling; redness; a red streak on the shaft of the penis; or a thick, yellow discharge.   Watch closely for changes in your child's health, and be sure to contact your doctor if:    A Plastibell device was used for the circumcision and the ring has not fallen off after 10 to 12 days.   Where can you learn more?  Go to https://www.[employer].net/patiented  Enter S255 in the search box to learn more about \"Circumcision in Infants: What to Expect at Home.\"  Current as of: February 28, 2023               Content Version: 13.8    0095-4560 [employer].   Care instructions adapted under license by your healthcare professional. If you have questions about a medical condition or this instruction, always ask your healthcare professional. [employer], " "[facility] disclaims any warranty or liability for your use of this information.      Week 37 of Your Pregnancy: Care Instructions    Most babies are born between 37 and 40 weeks.   This is a good time to pack a bag to take with you to the birth. Then it will be ready to go when you are.     Learn about breastfeeding.  For example, find out about ways to hold your baby to make breastfeeding easier. And think about learning how to pump and store milk.     Know that crying is normal.  It's common for babies to cry 1 to 3 hours a day. Some cry more, and some cry less.     Learn why babies cry.  They may be hungry; have gas; need a diaper change; or feel cold, warm, tired, lonely, or tense. Sometimes they cry for unknown reasons.     Think about what will help you stay calm when your baby cries.  Taking slow, deep breaths can help. So can taking a break. It's okay to put your baby somewhere safe (like their crib) and walk away for a few minutes.     Learn about safe sleep for your baby.  Always put your baby to sleep on their back. Place them alone in a crib or bassinet with a firm, flat surface. Keep soft items like stuffed animals out of the crib.     Learn what to expect with  poop.  Your baby will have their own bowel patterns. Some babies have several bowel movements a day. Some have fewer.     Know that  babies will often have loose, yellow bowel movements.  Formula-fed babies have more formed stools. If your baby's poop looks like pellets, your baby is constipated.   Follow-up care is a key part of your treatment and safety. Be sure to make and go to all appointments, and call your doctor if you are having problems. It's also a good idea to know your test results and keep a list of the medicines you take.  Where can you learn more?  Go to https://www.healthwise.net/patiented  Enter N257 in the search box to learn more about \"Week 37 of Your Pregnancy: Care Instructions.\"  Current as of:  " 2023               Content Version: 13.8    1028-9097 [employer].   Care instructions adapted under license by your healthcare professional. If you have questions about a medical condition or this instruction, always ask your healthcare professional. [employer] disclaims any warranty or liability for your use of this information.

## 2023-12-06 NOTE — NURSING NOTE
"Initial /71 (BP Location: Left arm, Patient Position: Sitting, Cuff Size: Adult Regular)   Pulse 104   Temp 97.6  F (36.4  C) (Tympanic)   Resp 14   Ht 1.549 m (5' 1\")   Wt 95.8 kg (211 lb 3.2 oz)   LMP 03/27/2023   BMI 39.91 kg/m   Estimated body mass index is 39.91 kg/m  as calculated from the following:    Height as of this encounter: 1.549 m (5' 1\").    Weight as of this encounter: 95.8 kg (211 lb 3.2 oz). .      "

## 2023-12-06 NOTE — TELEPHONE ENCOUNTER
"[id number]  Asmita Frye    You are now scheduled for surgery at [facility].  Below are the details for your surgery.  Please read the \"Preparing for Your Surgery\" instructions and let us know if you have any questions.    Type of surgery:  SECTION   Surgeon:  Melodie Sesay DO  Location of surgery: [facility] OR    Date of surgery: 23    Time: 11:00am   Arrival Time: 9:30am    Time can change, to be confirmed a couple of days prior by pre-op surgery nurse.    Pre-Op Appt Date: AM of surgery  Post-Op Appt Date:                 Time:     Packet sent out: Yes  Pre-cert/Authorization completed:  TBD by Financial Securing Office.   MA Sterilization/Hysterectomy Acknowledgment Consent signed: Not Applicable    [facility]  [phone]    Fax: [phone]  Same Day Surgery [phone]  Fax: [phone]  [facility] [phone]    "

## 2023-12-07 ENCOUNTER — VIRTUAL VISIT (OUTPATIENT)
Dept: EDUCATION SERVICES | Facility: CLINIC | Age: 33
End: 2023-12-07
Payer: COMMERCIAL

## 2023-12-07 DIAGNOSIS — O24.419 GESTATIONAL DIABETES MELLITUS (GDM) IN THIRD TRIMESTER, GESTATIONAL DIABETES METHOD OF CONTROL UNSPECIFIED: Primary | ICD-10-CM

## 2023-12-07 PROCEDURE — G0108 DIAB MANAGE TRN  PER INDIV: HCPCS | Mod: 95

## 2023-12-07 NOTE — PATIENT INSTRUCTIONS
Test glucose 4 times per day:              Fasting (when you first awake for the day): 95 mg/dL or below              1 hour after breakfast: 140 mg/dL or below              1 hour after lunch: 140 mg/dL or below              1 hour after dinner: 140 mg/dL or below                 Please bring your meter and log book to all appointments                 If you miss 1 hour after meal test, test 2 hours after the meal.  Goal 2 hours after is 120 mg/dL or below.      2.  Check your urine ketones once a day, when you first awake for the day until they are negative to trace for 7 days in a row.  Then decrease and check once a week.      3.  Meal Planning   Dinner: 45-60 grams of carbohydrate      -read labels for tortillas     -1 cup (about the size of your fist) of cooked rice and/or beans is about 45 grams of carbohydrate    For your bedtime snack: 1 slice bread with peanut butter OR 1 cup milk OR 1 slice of bread with a prepared egg     4.  Aim for 20-30 minutes of activity most days of the week (with the okay of your OB provider).       5.  Continue with NPH as 13 units once a day at bedtime.      6. Keep something with you for treating a low blood sugar (below 80 mg/dL).  If your blood sugar is low, eat/drink ONE of the following:   -1/2 cup juice  -1/2 cup regular soda  -1 package fruit snacks  -4 glucose tablets  Then, wait 15 minutes and re-check blood sugar.  If it is not above 70 mg/dL, repeat the above.     7.  Follow up: Monday, December 11th at 9 AM with Margarita via phone     8.  Call Diabetes Education at [phone] or send a [employer] message with:              -questions or concerns              -ketones that are small, moderate, or large              -3 or more blood sugars above target in a 7 day period              -any low blood sugars    Thank you,   Marisol Strange, MPH, RD, CDCES, LD 12/7/2023

## 2023-12-07 NOTE — PROGRESS NOTES
Diabetes and Pregnancy Follow-up  Type of Service: Telephone Visit/ 75 minutes     Originating Location (Patient Location): Home  Distant Location (Provider Location): [facility]  Mode of Communication:  Telephone     Telephone Visit Start Time: 10:46 AM  Telephone Visit End Time (telephone visit stop time): 12:01 PM  [employer] phone  #[phone] used for duration of visit    How would patient like to obtain AVS? not needed      Subjective/Objective:    Asmita Frye was called for a scheduled BG review. Last date of communication was: 12/4/23.    Gestational diabetes is being managed with diet, activity, and medications    Taking diabetes medications: yes:     Diabetes Medication(s)       Insulin       insulin NPH (HUMULIN N KWIKPEN) 100 UNIT/ML injection    Inject 13 Units Subcutaneous at bedtime            Estimated Date of Delivery: Jan 7, 2024    Blood Glucose/Ketone Log:    Date Ketones Fasting Post Breakfast Post Lunch Post Supper dinner   12/4 moderate 89 113 131 126    12/5 trace 91 133 133 126 Pork ribs with beans (less than 1/2 cup), 1-2 corn tortillas   12/6 small 88 129 106 131 Egg, 4 corn tortillas, beef sausage, avocado   12/7 small 90 130      (Testing glucoses 1 hour post meal)    Assessment:    Ketones: trace x1, small x2, moderate x1.   Fasting blood glucoses: 100% in target.  After breakfast: 100% in target.  Before lunch: n/a% in target.  After lunch: 100% in target.  Before dinner: n/a% in target.  After dinner: 100% in target.    Patient reports she has been having a boiled egg with a slice wheat bread OR a slice of bread with peanut butter with 1/2 glass of milk for her snack at bedtime since we last spoke on 12/4/23. Frustrated that she continues to have ketones that are small. Based on the two most recent dinner meals she can recall, anticipate she is not consuming sufficient carbohydrate at dinner meal. Again had patient practice reading a label for total carbohydrates.   Today she used the label from the corn tortillas and determined that 1 tortilla has 10 grams of carbohydrate.  Reviewed recommendation of 45-60 grams of carbohydrate at dinner meal.  Discussed approximate carbohydrate in preferred/usual foods without labels including fruits, rice, and beans.     Requested assistance with prescriptions.  Unsure why she received only 1 NPH pen initially.  Also in need of a refill of test strips; states when she went to obtain yesterday, they gave her lancets.   Also needs refill of urine ketone strips.  Together called pharmacy and they will process refills for NPH, test strips, and urine ketone strips. Per pharmacy, patient's insurance will only allow them to dispense 1 NPH pen at a time.  Updated patient.  Reviewed NPH pen is only good at room temperature for 2 weeks so she will need a new pen every 2 weeks.  Patient stated understanding and agreement.     Plan/Response:  Test glucose 4 times per day:              Fasting (when you first awake for the day): 95 mg/dL or below              1 hour after breakfast: 140 mg/dL or below              1 hour after lunch: 140 mg/dL or below              1 hour after dinner: 140 mg/dL or below                 Please bring your meter and log book to all appointments                 If you miss 1 hour after meal test, test 2 hours after the meal.  Goal 2 hours after is 120 mg/dL or below.      2.  Check your urine ketones once a day, when you first awake for the day until they are negative to trace for 7 days in a row.  Then decrease and check once a week.      3.  Meal Planning   Dinner: 45-60 grams of carbohydrate      -read labels for tortillas     -1 cup (about the size of your fist) of cooked rice and/or beans is about 45 grams of carbohydrate    For your bedtime snack: 1 slice bread with peanut butter OR 1 cup milk OR 1 slice of bread with a prepared egg     4.  Aim for 20-30 minutes of activity most days of the week (with the okay of  your OB provider).       5.  Continue with NPH as 13 units once a day at bedtime.      6. Keep something with you for treating a low blood sugar (below 80 mg/dL).  If your blood sugar is low, eat/drink ONE of the following:   -1/2 cup juice  -1/2 cup regular soda  -1 package fruit snacks  -4 glucose tablets  Then, wait 15 minutes and re-check blood sugar.  If it is not above 70 mg/dL, repeat the above.     7.  Follow up: Monday, December 11th at 9 AM with Margarita via phone     8.  Call Diabetes Education at [phone] or send a [employer] message with:              -questions or concerns              -ketones that are small, moderate, or large              -3 or more blood sugars above target in a 7 day period              -any low blood sugars    Marisol Strange, MPH, RD, CDCES, LD 12/7/2023    Time Spent: 75 minutes    Any diabetes medication dose changes were made via the CDE Protocol and Collaborative Practice Agreement with the patient's referring provider. A copy of this encounter was shared with the provider.

## 2023-12-07 NOTE — LETTER
12/7/2023         RE: Asmita Frye  [address] [facility] [place] [address]        Dear Colleague,    Thank you for referring your patient, Asmita Frye, to the [facility]. Please see a copy of my visit note below.    Diabetes and Pregnancy Follow-up  Type of Service: Telephone Visit/ 75 minutes     Originating Location (Patient Location): Home  Distant Location (Provider Location): [facility]  Mode of Communication:  Telephone     Telephone Visit Start Time: 10:46 AM  Telephone Visit End Time (telephone visit stop time): 12:01 PM  Khmer phone  #[phone] used for duration of visit    How would patient like to obtain AVS? not needed      Subjective/Objective:    Asmita Frye was called for a scheduled BG review. Last date of communication was: 12/4/23.    Gestational diabetes is being managed with diet, activity, and medications    Taking diabetes medications: yes:     Diabetes Medication(s)       Insulin       insulin NPH (HUMULIN N KWIKPEN) 100 UNIT/ML injection    Inject 13 Units Subcutaneous at bedtime            Estimated Date of Delivery: Jan 7, 2024    Blood Glucose/Ketone Log:    Date Ketones Fasting Post Breakfast Post Lunch Post Supper dinner   12/4 moderate 89 113 131 126    12/5 trace 91 133 133 126 Pork ribs with beans (less than 1/2 cup), 1-2 corn tortillas   12/6 small 88 129 106 131 Egg, 4 corn tortillas, beef sausage, avocado   12/7 small 90 130      (Testing glucoses 1 hour post meal)    Assessment:    Ketones: trace x1, small x2, moderate x1.   Fasting blood glucoses: 100% in target.  After breakfast: 100% in target.  Before lunch: n/a% in target.  After lunch: 100% in target.  Before dinner: n/a% in target.  After dinner: 100% in target.    Patient reports she has been having a boiled egg with a slice wheat bread OR a slice of bread with peanut butter with 1/2 glass of milk for her snack at bedtime since we last spoke on 12/4/23.  Frustrated that she continues to have ketones that are small. Based on the two most recent dinner meals she can recall, anticipate she is not consuming sufficient carbohydrate at dinner meal. Again had patient practice reading a label for total carbohydrates.  Today she used the label from the corn tortillas and determined that 1 tortilla has 10 grams of carbohydrate.  Reviewed recommendation of 45-60 grams of carbohydrate at dinner meal.  Discussed approximate carbohydrate in preferred/usual foods without labels including fruits, rice, and beans.     Requested assistance with prescriptions.  Unsure why she received only 1 NPH pen initially.  Also in need of a refill of test strips; states when she went to obtain yesterday, they gave her lancets.   Also needs refill of urine ketone strips.  Together called pharmacy and they will process refills for NPH, test strips, and urine ketone strips. Per pharmacy, patient's insurance will only allow them to dispense 1 NPH pen at a time.  Updated patient.  Reviewed NPH pen is only good at room temperature for 2 weeks so she will need a new pen every 2 weeks.  Patient stated understanding and agreement.     Plan/Response:  Test glucose 4 times per day:              Fasting (when you first awake for the day): 95 mg/dL or below              1 hour after breakfast: 140 mg/dL or below              1 hour after lunch: 140 mg/dL or below              1 hour after dinner: 140 mg/dL or below                 Please bring your meter and log book to all appointments                 If you miss 1 hour after meal test, test 2 hours after the meal.  Goal 2 hours after is 120 mg/dL or below.      2.  Check your urine ketones once a day, when you first awake for the day until they are negative to trace for 7 days in a row.  Then decrease and check once a week.      3.  Meal Planning   Dinner: 45-60 grams of carbohydrate      -read labels for tortillas     -1 cup (about the size of your fist)  of cooked rice and/or beans is about 45 grams of carbohydrate    For your bedtime snack: 1 slice bread with peanut butter OR 1 cup milk OR 1 slice of bread with a prepared egg     4.  Aim for 20-30 minutes of activity most days of the week (with the okay of your OB provider).       5.  Continue with NPH as 13 units once a day at bedtime.      6. Keep something with you for treating a low blood sugar (below 80 mg/dL).  If your blood sugar is low, eat/drink ONE of the following:   -1/2 cup juice  -1/2 cup regular soda  -1 package fruit snacks  -4 glucose tablets  Then, wait 15 minutes and re-check blood sugar.  If it is not above 70 mg/dL, repeat the above.     7.  Follow up: Monday, December 11th at 9 AM with Margarita via phone     8.  Call Diabetes Education at [phone] or send a [employer] message with:              -questions or concerns              -ketones that are small, moderate, or large              -3 or more blood sugars above target in a 7 day period              -any low blood sugars    Marisol Stragne, MPH, RD, CDCES, LD 12/7/2023    Time Spent: 75 minutes    Any diabetes medication dose changes were made via the CDE Protocol and Collaborative Practice Agreement with the patient's referring provider. A copy of this encounter was shared with the provider.

## 2023-12-08 ENCOUNTER — HOSPITAL ENCOUNTER (OUTPATIENT)
Dept: ULTRASOUND IMAGING | Facility: CLINIC | Age: 33
Discharge: HOME | End: 2023-12-08
Attending: OBSTETRICS & GYNECOLOGY | Admitting: OBSTETRICS & GYNECOLOGY
Payer: COMMERCIAL

## 2023-12-08 DIAGNOSIS — O24.419 GESTATIONAL DIABETES MELLITUS (GDM) IN THIRD TRIMESTER, GESTATIONAL DIABETES METHOD OF CONTROL UNSPECIFIED: ICD-10-CM

## 2023-12-08 PROCEDURE — 76816 OB US FOLLOW-UP PER FETUS: CPT

## 2023-12-11 ENCOUNTER — VIRTUAL VISIT (OUTPATIENT)
Dept: EDUCATION SERVICES | Facility: CLINIC | Age: 33
End: 2023-12-11
Payer: COMMERCIAL

## 2023-12-11 ENCOUNTER — TELEPHONE (OUTPATIENT)
Dept: FAMILY MEDICINE | Facility: CLINIC | Age: 33
End: 2023-12-11

## 2023-12-11 DIAGNOSIS — O24.414 GESTATIONAL DIABETES MELLITUS (GDM) REQUIRING INSULIN: Primary | ICD-10-CM

## 2023-12-11 DIAGNOSIS — O24.419 GESTATIONAL DIABETES MELLITUS (GDM) IN THIRD TRIMESTER, GESTATIONAL DIABETES METHOD OF CONTROL UNSPECIFIED: ICD-10-CM

## 2023-12-11 PROCEDURE — G0108 DIAB MANAGE TRN  PER INDIV: HCPCS | Mod: AE | Performed by: DIETITIAN, REGISTERED

## 2023-12-11 RX ORDER — INSULIN HUMAN 100 [IU]/ML
15 INJECTION, SUSPENSION SUBCUTANEOUS AT BEDTIME
Qty: 15 ML | Refills: 0 | Status: SHIPPED | OUTPATIENT
Start: 2023-12-11 | End: 2023-12-14

## 2023-12-11 NOTE — TELEPHONE ENCOUNTER
Patient missed her GDM follow up today and I cannot fit her in sooner than her already scheduled appt on 12-18-23.  Please call to fit her in sooner.

## 2023-12-11 NOTE — LETTER
2023         RE: Asmita Frye  [address] [facility] [place] [address]        Dear Colleague,    Thank you for referring your patient, Asmita Frye, to the [facility]. Please see a copy of my visit note below.    Diabetes and Pregnancy Follow-up  Type of Service: Telephone Visit    How would patient like to obtain AVS? Not needed    Subjective/Objective:    Asmita Frye was called for a scheduled BG review, utilizing  [id number]. Last date of communication was: 23.    Gestational diabetes is being managed with diet, activity, and medications    Taking diabetes medications: yes:     Diabetes Medication(s)       Insulin       insulin NPH (HUMULIN N KWIKPEN) 100 UNIT/ML injection    Inject 13 Units Subcutaneous at bedtime            Estimated Date of Delivery: 2024    Blood Glucose/Ketone Log:    Date Ketones Fasting Post Breakfast Post Lunch Post Supper    negative 101 134 160 133    - - - 126 147   12/10 trace 96 147 126 137    trace 93 149           Assessment:    Ketones: negative to trace.   Fasting blood glucoses: 33% in target.  After breakfast: 33% in target.  Before lunch: -% in target.  After lunch: 66% in target.  Before dinner: -% in target.  After dinner: 66% in target.    Dinner 6pm, checking blood sugars 8pm, eating snack at 9pm, insulin at 9:30-10pm. Sometimes checking 2 hours after meals, inconsistently. She is trying to adjust what she is eating to help with the numbers. Also, she tries to move her body a little bit after meals but also not consistent, can be hard with weather, schedule, etc. Encouraged her to include this to help bring blood sugars to normal range and she is understanding of this. Per EMR,  scheduled for .     At end of the call, patient requests to have the same  over the phone (ideally today's ) as she has had challenges with some of the  interpreters sounding unhappy or frustrated. She understand English quite well but can't always understand so does need/prefer an  but it's hard if who she has on the line isn't that helpful. Writer will email [facility]  services with this request, in hopes of scheduling an  for her next 2 follow up appointments.     Plan/Response:  Recommend increase to insulin - NPH 0-0-0-13 --> 0-0-0-15.  Include 5-10 minutes of activity, walking, etc after meals   Follow-up in 3-4 days.    Margarita Allen RD, LD, [facility]   Time Spent: 30 minutes    Any diabetes medication dose changes were made via the CDE Protocol and Collaborative Practice Agreement with the patient's OB/GYN provider. A copy of this encounter was shared with the provider.

## 2023-12-11 NOTE — PROGRESS NOTES
Diabetes and Pregnancy Follow-up  Type of Service: Telephone Visit    How would patient like to obtain AVS? Not needed    Subjective/Objective:    Asmita Frye was called for a scheduled BG review, utilizing  [id number]. Last date of communication was: 23.    Gestational diabetes is being managed with diet, activity, and medications    Taking diabetes medications: yes:     Diabetes Medication(s)       Insulin       insulin NPH (HUMULIN N KWIKPEN) 100 UNIT/ML injection    Inject 13 Units Subcutaneous at bedtime            Estimated Date of Delivery: 2024    Blood Glucose/Ketone Log:    Date Ketones Fasting Post Breakfast Post Lunch Post Supper    negative 101 134 160 133    - - - 126 147   12/10 trace 96 147 126 137    trace 93 149           Assessment:    Ketones: negative to trace.   Fasting blood glucoses: 33% in target.  After breakfast: 33% in target.  Before lunch: -% in target.  After lunch: 66% in target.  Before dinner: -% in target.  After dinner: 66% in target.    Dinner 6pm, checking blood sugars 8pm, eating snack at 9pm, insulin at 9:30-10pm. Sometimes checking 2 hours after meals, inconsistently. She is trying to adjust what she is eating to help with the numbers. Also, she tries to move her body a little bit after meals but also not consistent, can be hard with weather, schedule, etc. Encouraged her to include this to help bring blood sugars to normal range and she is understanding of this. Per EMR,  scheduled for .     At end of the call, patient requests to have the same  over the phone (ideally today's ) as she has had challenges with some of the interpreters sounding unhappy or frustrated. She understand English quite well but can't always understand so does need/prefer an  but it's hard if who she has on the line isn't that helpful. Writer will email [facility]  services with this  request, in hopes of scheduling an  for her next 2 follow up appointments.     Plan/Response:  Recommend increase to insulin - NPH 0-0-0-13 --> 0-0-0-15.  Include 5-10 minutes of activity, walking, etc after meals   Follow-up in 3-4 days.    Margarita Allen RD, LD, [facility] [place]   Time Spent: 30 minutes    Any diabetes medication dose changes were made via the CDE Protocol and Collaborative Practice Agreement with the patient's OB/GYN provider. A copy of this encounter was shared with the provider.

## 2023-12-11 NOTE — TELEPHONE ENCOUNTER
I will attempt to call her again today in my add-on slot at 12:30p.     Margarita Allen RD, LD, [facility]

## 2023-12-12 ENCOUNTER — PRENATAL OFFICE VISIT (OUTPATIENT)
Dept: OBGYN | Facility: CLINIC | Age: 33
End: 2023-12-12
Payer: COMMERCIAL

## 2023-12-12 VITALS
HEIGHT: 61 IN | BODY MASS INDEX: 39.73 KG/M2 | TEMPERATURE: 97.7 F | SYSTOLIC BLOOD PRESSURE: 110 MMHG | DIASTOLIC BLOOD PRESSURE: 68 MMHG | WEIGHT: 210.4 LBS | HEART RATE: 95 BPM | RESPIRATION RATE: 14 BRPM

## 2023-12-12 DIAGNOSIS — Z98.891 HISTORY OF CESAREAN SECTION: ICD-10-CM

## 2023-12-12 DIAGNOSIS — E66.812 CLASS 2 OBESITY WITHOUT SERIOUS COMORBIDITY WITH BODY MASS INDEX (BMI) OF 38.0 TO 38.9 IN ADULT, UNSPECIFIED OBESITY TYPE: ICD-10-CM

## 2023-12-12 DIAGNOSIS — Z34.83 PRENATAL CARE, SUBSEQUENT PREGNANCY IN THIRD TRIMESTER: Primary | ICD-10-CM

## 2023-12-12 DIAGNOSIS — O09.522 MULTIGRAVIDA OF ADVANCED MATERNAL AGE IN SECOND TRIMESTER: ICD-10-CM

## 2023-12-12 DIAGNOSIS — O24.414 GESTATIONAL DIABETES MELLITUS (GDM) REQUIRING INSULIN: ICD-10-CM

## 2023-12-12 PROCEDURE — 99207 PR PRENATAL VISIT: CPT | Performed by: OBSTETRICS & GYNECOLOGY

## 2023-12-12 PROCEDURE — 87653 STREP B DNA AMP PROBE: CPT | Performed by: OBSTETRICS & GYNECOLOGY

## 2023-12-12 NOTE — PROGRESS NOTES
"[facility]    Return OB Note    CC: Return OB     Subjective:  Asmita is a 33 year old  at 36w2d   Denies vaginal bleeding, loss of fluid, or regular contractions. Good fetal movement.  Complaints today: None    Objective:  /68 (BP Location: Left arm, Patient Position: Sitting, Cuff Size: Adult Large)   Pulse 95   Temp 97.7  F (36.5  C) (Tympanic)   Resp 14   Ht 1.549 m (5' 1\")   Wt 95.4 kg (210 lb 6.4 oz)   LMP 2023   BMI 39.75 kg/m      Fundal height: 38cm  FHT: 140bpm  SVE: 0/50/-2    Assessment/Plan:   Encounter Diagnoses   Name Primary?    Prenatal care, subsequent pregnancy in third trimester Yes    Gestational diabetes mellitus (GDM) requiring insulin     History of  section     Multigravida of advanced maternal age in second trimester     Class 2 obesity without serious comorbidity with body mass index (BMI) of 38.0 to 38.9 in adult, unspecified obesity type        IUP at 36w2d  -NOB labs normal  -Prenatal screening: declines  -Anatomy US normal (declined L2)  -Mid trimester labs: GDMA  -S/p Flu shot and Tdap  -GBS collected today     Co-Morbidities/Complications/Concerns:   -GDMA2: taking PM NPH, have been titrating for improved control. BPPs have been ordered, but patient has not yet initiated. Follow growth US. US  EFW 94%, AC >98%.  She does not plan to complete BPPs. Will plan for earlier delivery based on lack of  surveillance. C section scheduled for 23.  -AMA: declines L2 and genetics   -History of C section x2: have discussed option for TOLAC consult vs. Repeat C section. She has made the decision to move forward with repeat C section, scheduled for .  -Obesity BMI 38, also S>D: following US, will have BPPs  -Strict return precautions given    RTC post partum    Melodie Sesay DO    "

## 2023-12-12 NOTE — NURSING NOTE
"Initial /68 (BP Location: Left arm, Patient Position: Sitting, Cuff Size: Adult Large)   Pulse 95   Temp 97.7  F (36.5  C) (Tympanic)   Resp 14   Ht 1.549 m (5' 1\")   Wt 95.4 kg (210 lb 6.4 oz)   LMP 03/27/2023   BMI 39.75 kg/m   Estimated body mass index is 39.75 kg/m  as calculated from the following:    Height as of this encounter: 1.549 m (5' 1\").    Weight as of this encounter: 95.4 kg (210 lb 6.4 oz). .    "

## 2023-12-12 NOTE — TELEPHONE ENCOUNTER
Spoke with patient on 12/11  to review blood sugars. See phone encounter note.     Margarita Allen RD, LD, [facility]

## 2023-12-13 LAB — GP B STREP DNA SPEC QL NAA+PROBE: POSITIVE

## 2023-12-14 ENCOUNTER — VIRTUAL VISIT (OUTPATIENT)
Dept: EDUCATION SERVICES | Facility: CLINIC | Age: 33
End: 2023-12-14
Payer: COMMERCIAL

## 2023-12-14 DIAGNOSIS — O24.419 GESTATIONAL DIABETES MELLITUS (GDM) IN THIRD TRIMESTER, GESTATIONAL DIABETES METHOD OF CONTROL UNSPECIFIED: ICD-10-CM

## 2023-12-14 DIAGNOSIS — O24.414 GESTATIONAL DIABETES MELLITUS (GDM) REQUIRING INSULIN: Primary | ICD-10-CM

## 2023-12-14 PROCEDURE — 98968 PH1 ASSMT&MGMT NQHP 21-30: CPT | Performed by: DIETITIAN, REGISTERED

## 2023-12-14 RX ORDER — INSULIN HUMAN 100 [IU]/ML
18 INJECTION, SUSPENSION SUBCUTANEOUS AT BEDTIME
Qty: 15 ML | Refills: 0 | Status: ON HOLD | OUTPATIENT
Start: 2023-12-14 | End: 2023-12-23

## 2023-12-14 NOTE — LETTER
12/14/2023         RE: Asmita Frye  [address] [facility] [place] [address]        Dear Colleague,    Thank you for referring your patient, Asmita Frye, to the [facility]. Please see a copy of my visit note below.    Diabetes and Pregnancy Follow-up  Type of Service: Telephone Visit    How would patient like to obtain AVS? Not needed    Subjective/Objective:    Asmita Frye was called for a scheduled BG review. Last date of communication was: 12/11/23.    Gestational diabetes is being managed with diet, activity, and medications    Taking diabetes medications: yes:     Diabetes Medication(s)       Insulin       insulin NPH (HUMULIN N KWIKPEN) 100 UNIT/ML injection    Inject 15 Units Subcutaneous at bedtime            Estimated Date of Delivery: Jan 7, 2024    Blood Glucose/Ketone Log:    Date Ketones Fasting Post Breakfast Post Lunch Post Supper   12/11 trace 93 149 123 154  Pozole soup- but vegetables vs. Corn, plus 4.5 tortilla    12/12 small 104 131 118 130   12/13 small 95 116 123 126   12/14 negative 103 157  2 quesadilla- corn tortilla, cheese, lettuce, 1 with meat   Total: 2 thin tortilla, 1 fried and folded.           Assessment:  Fasting and post breakfast inconsistently elevated.  Recommend increase to Nph insulin.  She is having 40-50g total carbohydrates per meal.  HS snack usually 1 hard boiled egg with 1 slice whole wheat bread    Ketones: small-negative.   Fasting blood glucoses: 50% in target.  After breakfast: 50% in target.  Before lunch: -% in target.  After lunch: 100% in target.  Before dinner: -% in target.  After dinner: 67% in target.    Plan/Response:  Recommend increase to insulin - NPH 0-0-0-15 --> 0-0-0-18.  Reviewed food intake and confirmed it was within recommended intake.  Discussed post partum testing.    Patient would like to continue this diet post partum- advised ok, but if breastfeeding she will need to increase carbohydrate intake  during that time, then ok to reduce when done.        Cielo Plunkett MS, RD, LD, CDE  Time Spent: 22 minutes    Any diabetes medication dose changes were made via the CDE Protocol and Collaborative Practice Agreement with the patient's OB/GYN provider. A copy of this encounter was shared with the provider.

## 2023-12-14 NOTE — PROGRESS NOTES
Diabetes and Pregnancy Follow-up  Type of Service: Telephone Visit    How would patient like to obtain AVS? Not needed    Subjective/Objective:    Asmita Frye was called for a scheduled BG review. Last date of communication was: 12/11/23.    Gestational diabetes is being managed with diet, activity, and medications    Taking diabetes medications: yes:     Diabetes Medication(s)       Insulin       insulin NPH (HUMULIN N KWIKPEN) 100 UNIT/ML injection    Inject 15 Units Subcutaneous at bedtime            Estimated Date of Delivery: Jan 7, 2024    Blood Glucose/Ketone Log:    Date Ketones Fasting Post Breakfast Post Lunch Post Supper   12/11 trace 93 149 123 154  Pozole soup- but vegetables vs. Corn, plus 4.5 tortilla    12/12 small 104 131 118 130   12/13 small 95 116 123 126   12/14 negative 103 157  2 quesadilla- corn tortilla, cheese, lettuce, 1 with meat   Total: 2 thin tortilla, 1 fried and folded.           Assessment:  Fasting and post breakfast inconsistently elevated.  Recommend increase to Nph insulin.  She is having 40-50g total carbohydrates per meal.  HS snack usually 1 hard boiled egg with 1 slice whole wheat bread    Ketones: small-negative.   Fasting blood glucoses: 50% in target.  After breakfast: 50% in target.  Before lunch: -% in target.  After lunch: 100% in target.  Before dinner: -% in target.  After dinner: 67% in target.    Plan/Response:  Recommend increase to insulin - NPH 0-0-0-15 --> 0-0-0-18.  Reviewed food intake and confirmed it was within recommended intake.  Discussed post partum testing.    Patient would like to continue this diet post partum- advised ok, but if breastfeeding she will need to increase carbohydrate intake during that time, then ok to reduce when done.        Cielo Plunkett MS, RD, LD, CDE  Time Spent: 22 minutes    Any diabetes medication dose changes were made via the CDE Protocol and Collaborative Practice Agreement with the patient's OB/GYN provider. A copy  of this encounter was shared with the provider.

## 2023-12-18 ENCOUNTER — TELEPHONE (OUTPATIENT)
Dept: OBGYN | Facility: CLINIC | Age: 33
End: 2023-12-18

## 2023-12-18 ENCOUNTER — VIRTUAL VISIT (OUTPATIENT)
Dept: EDUCATION SERVICES | Facility: CLINIC | Age: 33
End: 2023-12-18
Payer: COMMERCIAL

## 2023-12-18 DIAGNOSIS — O24.414 GESTATIONAL DIABETES MELLITUS (GDM) REQUIRING INSULIN: Primary | ICD-10-CM

## 2023-12-18 PROCEDURE — G0108 DIAB MANAGE TRN  PER INDIV: HCPCS

## 2023-12-18 NOTE — LETTER
12/18/2023         RE: Asmita Frye  [address] [facility] [place] [address]        Dear Colleague,    Thank you for referring your patient, Asmita Frye, to the [facility]. Please see a copy of my visit note below.    Diabetes and Pregnancy Follow-up  Type of Service: Telephone Visit/ 45 minutes     Originating Location (Patient Location): Home  Distant Location (Provider Location): [place] [facility]  Mode of Communication:  Telephone     Telephone Visit Start Time: 9 AM, 9:16 AM, 4:52 PM  Telephone Visit End Time (telephone visit stop time): 9:10 AM, 9:45 AM, 5:08 PM  [facility] Nepali interpreters used for the initial 2 conversations,  #[id number] used for the third conversation     How would patient like to obtain AVS? Not needed    Subjective/Objective:    Asmita Frye was called for a scheduled BG review. Last date of communication was: 12/14/23.    Gestational diabetes is being managed with diet and medications    Taking diabetes medications: yes:     Diabetes Medication(s)       Insulin       insulin NPH (HUMULIN N KWIKPEN) 100 UNIT/ML injection    Inject 18 Units Subcutaneous at bedtime            Estimated Date of Delivery: Jan 7, 2024    Blood Glucose/Ketone Log:    Date Ketones Fasting Post Breakfast Post Lunch Post Supper   12/14    120 140   12/15 trace 92 138  140*  (sandwich (not wheat bread) and beans)   12/16 trace 87 133 139 154* (soup with vegetables, corn starch pudding)   12/17 trace 91 123 152 133* (gordita, mashed potatoes, salad with green peas)   12/18 negative 101      (Testing 1 hour post meals unless * = 2 hours after)      Assessment:  Ketones: negative x1, trace x3.   Fasting blood glucoses: 75% in target.  After breakfast: 100% in target.  Before lunch: n/a% in target.  After lunch: 67% in target.  Before dinner: n/a% in target.  After dinner: 25% in target.    Glucose elevated above target following each of the past 3  dinner meals. Not recommending rapid acting insulin start as she is scheduled for a  on 23.  Recommended patient have more simple meals for dinner, meals where she can easily count the carbohydrate (such as tortillas) with protein and non-starchy vegetables. Patient agreed. Also suggested she walk for 5-10 minutes post dinner meal.  Patient reports experiencing pain/pressure in vaginal area with walking; she is questioning contractions.  Patient was instructed to reach out to her OB to discuss these symptoms.    Patient asked how much insulin she should take at HS on , the night before her .  Writer mis-read the protocol and initially instructed patient to decrease NPH dose to 13 units on .  Writer noted error and called back patient to instruct to continue NPH at 18 units each night, including the night before her .  This aligns with the documentation from Dr. Sesay in the telephone encounter dated today, 23.  Despite reviewing this with patient, patient states she will wait for the clinic to call her back tomorrow. Patient verbalized at the end of the call that she understands to continue NPH at 18 units each night at bedtime, even  (the night before her ).     Plan/Response:  Continue to check BG 4 times daily (fasting and one hour(s) after each meal).  Continue NPH as 18 units/day (HS) even on , the night of your .   Have dinner meals where you can easily count the carbohydrate, goal is 45-60 grams at dinner + protein + non-starchy vegetables.  Phone follow up with Marisol on 23 at 1:30 PM.    Marisol Strange, MPH, RD, CDCES, LD 2023    Time Spent: 45 minutes    Any diabetes medication dose changes were made via the CDE Protocol and Collaborative Practice Agreement with the patient's referring provider. A copy of this encounter was shared with the provider.

## 2023-12-18 NOTE — TELEPHONE ENCOUNTER
[facility] Call Center    Phone Message    May a detailed message be left on voicemail: yes     Reason for Call: Other: Pt is wondering when is the last time she can eat before her . Please contact pt.     Action Taken: Message routed to:  Other: [place] [facility]    Travel Screening: Not Applicable

## 2023-12-18 NOTE — TELEPHONE ENCOUNTER
Called pt with Hungarian interpretor on the line.  Informed pt of eating and drinking instructions and that [facility] would also be [facility] her one day before.  Pt has questions - about insulin.  States she takes insulin at 9-10pm - dose has increased to 18 units.  Does she still take this the night before?  Pt was given Gatorade instructions but is confused - with diabetes does she still take this?    Please advise.    Thanks-    -Rena Galan  Clinic [facility]

## 2023-12-18 NOTE — TELEPHONE ENCOUNTER
Yes, recommend her taking her night time insulin, even the night before the surgery. Then still drinking the Gatorade in the morning. The insulin will help with the sugar in this drink, and we will monitor her blood sugar at the hospital.    Thanks,  Melodie Sesay DO

## 2023-12-18 NOTE — PROGRESS NOTES
Diabetes and Pregnancy Follow-up  Type of Service: Telephone Visit/ 45 minutes     Originating Location (Patient Location): Home  Distant Location (Provider Location): [facility]  Mode of Communication:  Telephone     Telephone Visit Start Time: 9 AM, 9:16 AM, 4:52 PM  Telephone Visit End Time (telephone visit stop time): 9:10 AM, 9:45 AM, 5:08 PM   [employer] Polish interpreters used for the initial 2 conversations,  #[id number] used for the third conversation     How would patient like to obtain AVS? Not needed    Subjective/Objective:    Asmita Frye was called for a scheduled BG review. Last date of communication was: 23.    Gestational diabetes is being managed with diet and medications    Taking diabetes medications: yes:     Diabetes Medication(s)       Insulin       insulin NPH (HUMULIN N KWIKPEN) 100 UNIT/ML injection    Inject 18 Units Subcutaneous at bedtime            Estimated Date of Delivery: 2024    Blood Glucose/Ketone Log:    Date Ketones Fasting Post Breakfast Post Lunch Post Supper       120 140   12/15 trace 92 138  140*  (sandwich (not wheat bread) and beans)    trace 87 133 139 154* (soup with vegetables, corn starch pudding)    trace 91 123 152 133* (gordita, mashed potatoes, salad with green peas)    negative 101      (Testing 1 hour post meals unless * = 2 hours after)      Assessment:  Ketones: negative x1, trace x3.   Fasting blood glucoses: 75% in target.  After breakfast: 100% in target.  Before lunch: n/a% in target.  After lunch: 67% in target.  Before dinner: n/a% in target.  After dinner: 25% in target.    Glucose elevated above target following each of the past 3 dinner meals. Not recommending rapid acting insulin start as she is scheduled for a  on 23.  Recommended patient have more simple meals for dinner, meals where she can easily count the carbohydrate (such as tortillas) with protein and  non-starchy vegetables. Patient agreed. Also suggested she walk for 5-10 minutes post dinner meal.  Patient reports experiencing pain/pressure in vaginal area with walking; she is questioning contractions.  Patient was instructed to reach out to her OB to discuss these symptoms.    Patient asked how much insulin she should take at HS on , the night before her .  Writer mis-read the protocol and initially instructed patient to decrease NPH dose to 13 units on .  Writer noted error and called back patient to instruct to continue NPH at 18 units each night, including the night before her .  This aligns with the documentation from Dr. Sesay in the telephone encounter dated today, 23.  Despite reviewing this with patient, patient states she will wait for the clinic to call her back tomorrow. Patient verbalized at the end of the call that she understands to continue NPH at 18 units each night at bedtime, even  (the night before her ).     Plan/Response:  Continue to check BG 4 times daily (fasting and one hour(s) after each meal).  Continue NPH as 18 units/day (HS) even on , the night of your .   Have dinner meals where you can easily count the carbohydrate, goal is 45-60 grams at dinner + protein + non-starchy vegetables.  Phone follow up with Marisol on 23 at 1:30 PM.    Marisol Strange, MPH, RD, CDCES, LD 2023    Time Spent: 45 minutes    Any diabetes medication dose changes were made via the CDE Protocol and Collaborative Practice Agreement with the patient's referring provider. A copy of this encounter was shared with the provider.

## 2023-12-19 ENCOUNTER — VIRTUAL VISIT (OUTPATIENT)
Dept: EDUCATION SERVICES | Facility: CLINIC | Age: 33
End: 2023-12-19
Payer: COMMERCIAL

## 2023-12-19 ENCOUNTER — ANESTHESIA EVENT (OUTPATIENT)
Dept: SURGERY | Facility: CLINIC | Age: 33
End: 2023-12-19
Payer: COMMERCIAL

## 2023-12-19 DIAGNOSIS — O24.414 GESTATIONAL DIABETES MELLITUS (GDM) REQUIRING INSULIN: Primary | ICD-10-CM

## 2023-12-19 PROCEDURE — 99207 PR NONPHYSICIAN TELEPHONE ASSESSMENT 11-20 MIN: CPT | Performed by: DIETITIAN, REGISTERED

## 2023-12-19 ASSESSMENT — LIFESTYLE VARIABLES: TOBACCO_USE: 1

## 2023-12-19 NOTE — TELEPHONE ENCOUNTER
Called pt with interpretor services.  Pt informed of Melodie Sesay DO response.    -Rena MCMAHON [facility]  [facility] [place]

## 2023-12-19 NOTE — PROGRESS NOTES
Diabetes and Pregnancy Follow-up  Type of Service: Telephone Visit/ 16 minutes     Originating Location (Patient Location): Home  Distant Location (Provider Location): [facility]  Mode of Communication:  Telephone     Telephone Visit Start Time: 1:32 PM  Telephone Visit End Time (telephone visit stop time): 1:48 PM  [employer]  used for the duration of the visit.     How would patient like to obtain AVS? not needed       Subjective/Objective:    Asmita Frye was called for a scheduled BG review. Last date of communication was: 23.    Gestational diabetes is being managed with diet and medications    Taking diabetes medications: yes:     Diabetes Medication(s)       Insulin       insulin NPH (HUMULIN N KWIKPEN) 100 UNIT/ML injection    Inject 18 Units Subcutaneous at bedtime          Estimated Date of Delivery: 2024    Blood Glucose/Ketone Log:    Date Ketones Fasting Post Breakfast Post Lunch Post Supper       114 144 (pork, 4 tortillas (40 grams carbohydrate), potatoes)    trace 88 114     (Testing 1 hour post meal)    Assessment:  Ketones: trace x1.   Fasting blood glucoses: 100% in target.  After breakfast: 100% in target.  Before lunch: n/a% in target.  After lunch: 100% in target.  Before dinner: n/a% in target.  After dinner: 0% in target.    Based on patient report of her dinner meal last night which resulted in an elevated post dinner glucose, recommended patient try to stick to 45 grams of carbohydrate at dinner meals.      Discussed labor, delivery, and postpartum. Instructed patient to continue NPH at 18 units/day (HS) including the night prior to her .  Patient stated understanding.    Plan/Response:  Continue to check BG 4 times daily (fasting and one hour(s) after each meal).  Continue NPH as 18 units/day (HS) even on , the night of your .   Have dinner meals where you can easily count the carbohydrate, goal  is 45 grams at dinner + protein + non-starchy vegetables.  Call Diabetes Education at [phone] with questions/concerns.     Marisol Strange, MPH, RD, CDCES, LD 12/19/2023    Time Spent: 16 minutes    Any diabetes medication dose changes were made via the CDE Protocol and Collaborative Practice Agreement with the patient's referring provider. A copy of this encounter was shared with the provider.

## 2023-12-19 NOTE — ANESTHESIA PREPROCEDURE EVALUATION
"Anesthesia Pre-Procedure Evaluation    Patient: Asmita Frye   MRN: [id number] : 1990        Procedure : Procedure(s):   SECTION          Past Medical History:   Diagnosis Date    Chickenpox       Past Surgical History:   Procedure Laterality Date    APPENDECTOMY        No Known Allergies   Social History     Tobacco Use    Smoking status: Former     Types: Cigarettes     Quit date: 2023     Years since quittin.8    Smokeless tobacco: Never   Substance Use Topics    Alcohol use: Not Currently      Wt Readings from Last 1 Encounters:   23 95.4 kg (210 lb 6.4 oz)        Anesthesia Evaluation   Pt has had prior anesthetic. Type: General.        ROS/MED HX  ENT/Pulmonary:     (+)                tobacco use,                        Neurologic:       Cardiovascular:       METS/Exercise Tolerance:     Hematologic:       Musculoskeletal:       GI/Hepatic:     (+)         appendicitis,           Renal/Genitourinary:       Endo:     (+)  type II DM,             Obesity,       Psychiatric/Substance Use:       Infectious Disease:       Malignancy:       Other:            Physical Exam    Airway  airway exam normal      Mallampati: I   TM distance: > 3 FB   Neck ROM: full   Mouth opening: > 3 cm    Respiratory Devices and Support         Dental       (+) Minor Abnormalities - some fillings, tiny chips      Cardiovascular   cardiovascular exam normal       Rhythm and rate: regular and normal     Pulmonary   pulmonary exam normal        breath sounds clear to auscultation           OUTSIDE LABS:  CBC:   Lab Results   Component Value Date    WBC 7.6 10/13/2023    WBC 8.9 2023    HGB 10.1 (L) 10/13/2023    HGB 12.4 2023    HCT 31.1 (L) 10/13/2023    HCT 36.5 2023     10/13/2023     2023     BMP: No results found for: \"NA\", \"POTASSIUM\", \"CHLORIDE\", \"CO2\", \"BUN\", \"CR\", \"GLC\"  COAGS: No results found for: \"PTT\", \"INR\", \"FIBR\"  POC: No results found for: \"BGM\", " "\"HCG\", \"HCGS\"  HEPATIC: No results found for: \"ALBUMIN\", \"PROTTOTAL\", \"ALT\", \"AST\", \"GGT\", \"ALKPHOS\", \"BILITOTAL\", \"BILIDIRECT\", \"NATALYA\"  OTHER:   Lab Results   Component Value Date    A1C 5.4 10/27/2023       Anesthesia Plan    ASA Status:  2    NPO Status:  NPO Appropriate    Anesthesia Type: Spinal.              Consents    Anesthesia Plan(s) and associated risks, benefits, and realistic alternatives discussed. Questions answered and patient/representative(s) expressed understanding.     - Discussed: Risks, Benefits and Alternatives for the PROCEDURE were discussed     - Discussed with:  Patient      - Extended Intubation/Ventilatory Support Discussed: No.      - Patient is DNR/DNI Status: No     Use of blood products discussed: No .     Postoperative Care            Comments:               Francine Stubbs CRNA, APRN CRNA    I have reviewed the pertinent notes and labs in the chart from the past 30 days and (re)examined the patient.  Any updates or changes from those notes are reflected in this note.                  "

## 2023-12-19 NOTE — LETTER
2023         RE: Asmita Frye  [address] [facility] [place] [address]        Dear Colleague,    Thank you for referring your patient, Asmita Frye, to the [facility]. Please see a copy of my visit note below.    Diabetes and Pregnancy Follow-up  Type of Service: Telephone Visit/ 16 minutes     Originating Location (Patient Location): Home  Distant Location (Provider Location): [facility]  Mode of Communication:  Telephone     Telephone Visit Start Time: 1:32 PM  Telephone Visit End Time (telephone visit stop time): 1:48 PM  [facility]  used for the duration of the visit.     How would patient like to obtain AVS? not needed       Subjective/Objective:    Asmita Frye was called for a scheduled BG review. Last date of communication was: 23.    Gestational diabetes is being managed with diet and medications    Taking diabetes medications: yes:     Diabetes Medication(s)       Insulin       insulin NPH (HUMULIN N KWIKPEN) 100 UNIT/ML injection    Inject 18 Units Subcutaneous at bedtime          Estimated Date of Delivery: 2024    Blood Glucose/Ketone Log:    Date Ketones Fasting Post Breakfast Post Lunch Post Supper       114 144 (pork, 4 tortillas (40 grams carbohydrate), potatoes)    trace 88 114     (Testing 1 hour post meal)    Assessment:  Ketones: trace x1.   Fasting blood glucoses: 100% in target.  After breakfast: 100% in target.  Before lunch: n/a% in target.  After lunch: 100% in target.  Before dinner: n/a% in target.  After dinner: 0% in target.    Based on patient report of her dinner meal last night which resulted in an elevated post dinner glucose, recommended patient try to stick to 45 grams of carbohydrate at dinner meals.      Discussed labor, delivery, and postpartum. Instructed patient to continue NPH at 18 units/day (HS) including the night prior to her .  Patient stated  understanding.    Plan/Response:  Continue to check BG 4 times daily (fasting and one hour(s) after each meal).  Continue NPH as 18 units/day (HS) even on , the night of your .   Have dinner meals where you can easily count the carbohydrate, goal is 45 grams at dinner + protein + non-starchy vegetables.  Call Diabetes Education at [phone] with questions/concerns.     Marisol Strange, MPH, RD, CDCES, LD 2023    Time Spent: 16 minutes    Any diabetes medication dose changes were made via the CDE Protocol and Collaborative Practice Agreement with the patient's referring provider. A copy of this encounter was shared with the provider.

## 2023-12-19 NOTE — TELEPHONE ENCOUNTER
Pt states gest diabetes doctor increased her to 18 units - pt is wondering if she should still take the 18 units of if she she take a lower dose the night before surgery.    Please advise.    Thanks-    -Rena Galan  [facility] [place]

## 2023-12-20 ENCOUNTER — TELEPHONE (OUTPATIENT)
Dept: OBGYN | Facility: CLINIC | Age: 33
End: 2023-12-20
Payer: COMMERCIAL

## 2023-12-21 ENCOUNTER — HOSPITAL ENCOUNTER (INPATIENT)
Facility: CLINIC | Age: 33
LOS: 2 days | Discharge: HOME | End: 2023-12-23
Attending: OBSTETRICS & GYNECOLOGY | Admitting: OBSTETRICS & GYNECOLOGY
Payer: COMMERCIAL

## 2023-12-21 ENCOUNTER — ANESTHESIA (OUTPATIENT)
Dept: SURGERY | Facility: CLINIC | Age: 33
End: 2023-12-21
Payer: COMMERCIAL

## 2023-12-21 DIAGNOSIS — Z98.891 S/P REPEAT LOW TRANSVERSE C-SECTION: ICD-10-CM

## 2023-12-21 LAB
ABO/RH(D): NORMAL
ALBUMIN SERPL BCG-MCNC: 3.5 G/DL (ref 3.5–5.2)
ALP SERPL-CCNC: 191 U/L (ref 40–150)
ALT SERPL W P-5'-P-CCNC: 16 U/L (ref 0–50)
ANION GAP SERPL CALCULATED.3IONS-SCNC: 11 MMOL/L (ref 7–15)
ANTIBODY SCREEN: NEGATIVE
AST SERPL W P-5'-P-CCNC: 16 U/L (ref 0–45)
BILIRUB SERPL-MCNC: 0.2 MG/DL
BUN SERPL-MCNC: 8.3 MG/DL (ref 6–20)
CALCIUM SERPL-MCNC: 8.8 MG/DL (ref 8.6–10)
CHLORIDE SERPL-SCNC: 102 MMOL/L (ref 98–107)
CREAT SERPL-MCNC: 0.52 MG/DL (ref 0.51–0.95)
DEPRECATED HCO3 PLAS-SCNC: 22 MMOL/L (ref 22–29)
EGFRCR SERPLBLD CKD-EPI 2021: >90 ML/MIN/1.73M2
ERYTHROCYTE [DISTWIDTH] IN BLOOD BY AUTOMATED COUNT: 15.1 % (ref 10–15)
GLUCOSE SERPL-MCNC: 96 MG/DL (ref 70–99)
HBA1C MFR BLD: 5.3 %
HCT VFR BLD AUTO: 31.7 % (ref 35–47)
HGB BLD-MCNC: 10.2 G/DL (ref 11.7–15.7)
MCH RBC QN AUTO: 29.7 PG (ref 26.5–33)
MCHC RBC AUTO-ENTMCNC: 32.2 G/DL (ref 31.5–36.5)
MCV RBC AUTO: 92 FL (ref 78–100)
PLATELET # BLD AUTO: 264 10E3/UL (ref 150–450)
POTASSIUM SERPL-SCNC: 3.6 MMOL/L (ref 3.4–5.3)
PROT SERPL-MCNC: 6.7 G/DL (ref 6.4–8.3)
RBC # BLD AUTO: 3.44 10E6/UL (ref 3.8–5.2)
SODIUM SERPL-SCNC: 135 MMOL/L (ref 135–145)
SPECIMEN EXPIRATION DATE: NORMAL
T PALLIDUM AB SER QL: NONREACTIVE
WBC # BLD AUTO: 7.4 10E3/UL (ref 4–11)

## 2023-12-21 PROCEDURE — 85027 COMPLETE CBC AUTOMATED: CPT | Performed by: OBSTETRICS & GYNECOLOGY

## 2023-12-21 PROCEDURE — 250N000011 HC RX IP 250 OP 636: Performed by: OBSTETRICS & GYNECOLOGY

## 2023-12-21 PROCEDURE — 250N000009 HC RX 250: Performed by: OBSTETRICS & GYNECOLOGY

## 2023-12-21 PROCEDURE — 59514 CESAREAN DELIVERY ONLY: CPT | Mod: AS | Performed by: NURSE PRACTITIONER

## 2023-12-21 PROCEDURE — 360N000076 HC SURGERY LEVEL 3, PER MIN: Performed by: OBSTETRICS & GYNECOLOGY

## 2023-12-21 PROCEDURE — 250N000013 HC RX MED GY IP 250 OP 250 PS 637: Performed by: OBSTETRICS & GYNECOLOGY

## 2023-12-21 PROCEDURE — 86901 BLOOD TYPING SEROLOGIC RH(D): CPT | Performed by: OBSTETRICS & GYNECOLOGY

## 2023-12-21 PROCEDURE — 36415 COLL VENOUS BLD VENIPUNCTURE: CPT | Performed by: OBSTETRICS & GYNECOLOGY

## 2023-12-21 PROCEDURE — 120N000001 HC R&B MED SURG/OB

## 2023-12-21 PROCEDURE — 271N000001 HC OR GENERAL SUPPLY NON-STERILE: Performed by: OBSTETRICS & GYNECOLOGY

## 2023-12-21 PROCEDURE — 59510 CESAREAN DELIVERY: CPT | Performed by: OBSTETRICS & GYNECOLOGY

## 2023-12-21 PROCEDURE — 86780 TREPONEMA PALLIDUM: CPT | Performed by: OBSTETRICS & GYNECOLOGY

## 2023-12-21 PROCEDURE — 250N000013 HC RX MED GY IP 250 OP 250 PS 637: Performed by: NURSE ANESTHETIST, CERTIFIED REGISTERED

## 2023-12-21 PROCEDURE — 272N000001 HC OR GENERAL SUPPLY STERILE: Performed by: OBSTETRICS & GYNECOLOGY

## 2023-12-21 PROCEDURE — 258N000003 HC RX IP 258 OP 636: Performed by: NURSE ANESTHETIST, CERTIFIED REGISTERED

## 2023-12-21 PROCEDURE — 86850 RBC ANTIBODY SCREEN: CPT | Performed by: OBSTETRICS & GYNECOLOGY

## 2023-12-21 PROCEDURE — 250N000011 HC RX IP 250 OP 636: Performed by: NURSE ANESTHETIST, CERTIFIED REGISTERED

## 2023-12-21 PROCEDURE — 83036 HEMOGLOBIN GLYCOSYLATED A1C: CPT | Performed by: OBSTETRICS & GYNECOLOGY

## 2023-12-21 PROCEDURE — 80053 COMPREHEN METABOLIC PANEL: CPT | Performed by: OBSTETRICS & GYNECOLOGY

## 2023-12-21 PROCEDURE — 370N000017 HC ANESTHESIA TECHNICAL FEE, PER MIN: Performed by: OBSTETRICS & GYNECOLOGY

## 2023-12-21 RX ORDER — NALBUPHINE HYDROCHLORIDE 10 MG/ML
2.5-5 INJECTION, SOLUTION INTRAMUSCULAR; INTRAVENOUS; SUBCUTANEOUS EVERY 6 HOURS PRN
Status: DISCONTINUED | OUTPATIENT
Start: 2023-12-21 | End: 2023-12-23 | Stop reason: HOSPADM

## 2023-12-21 RX ORDER — METHYLERGONOVINE MALEATE 0.2 MG/ML
200 INJECTION INTRAVENOUS
Status: DISCONTINUED | OUTPATIENT
Start: 2023-12-21 | End: 2023-12-21 | Stop reason: HOSPADM

## 2023-12-21 RX ORDER — ONDANSETRON 2 MG/ML
INJECTION INTRAMUSCULAR; INTRAVENOUS PRN
Status: DISCONTINUED | OUTPATIENT
Start: 2023-12-21 | End: 2023-12-21

## 2023-12-21 RX ORDER — MEPERIDINE HYDROCHLORIDE 25 MG/ML
12.5 INJECTION INTRAMUSCULAR; INTRAVENOUS; SUBCUTANEOUS EVERY 5 MIN PRN
Status: DISCONTINUED | OUTPATIENT
Start: 2023-12-21 | End: 2023-12-21

## 2023-12-21 RX ORDER — NALOXONE HYDROCHLORIDE 0.4 MG/ML
0.2 INJECTION, SOLUTION INTRAMUSCULAR; INTRAVENOUS; SUBCUTANEOUS
Status: DISCONTINUED | OUTPATIENT
Start: 2023-12-21 | End: 2023-12-23 | Stop reason: HOSPADM

## 2023-12-21 RX ORDER — ACETAMINOPHEN 325 MG/1
975 TABLET ORAL ONCE
Status: DISCONTINUED | OUTPATIENT
Start: 2023-12-21 | End: 2023-12-21

## 2023-12-21 RX ORDER — AMOXICILLIN 250 MG
2 CAPSULE ORAL 2 TIMES DAILY
Status: DISCONTINUED | OUTPATIENT
Start: 2023-12-21 | End: 2023-12-23 | Stop reason: HOSPADM

## 2023-12-21 RX ORDER — HYDROMORPHONE HCL IN WATER/PF 6 MG/30 ML
0.2 PATIENT CONTROLLED ANALGESIA SYRINGE INTRAVENOUS EVERY 5 MIN PRN
Status: DISCONTINUED | OUTPATIENT
Start: 2023-12-21 | End: 2023-12-21

## 2023-12-21 RX ORDER — MISOPROSTOL 200 UG/1
400 TABLET ORAL
Status: DISCONTINUED | OUTPATIENT
Start: 2023-12-21 | End: 2023-12-21 | Stop reason: HOSPADM

## 2023-12-21 RX ORDER — ACETAMINOPHEN 325 MG/1
975 TABLET ORAL ONCE
Status: COMPLETED | OUTPATIENT
Start: 2023-12-21 | End: 2023-12-21

## 2023-12-21 RX ORDER — DEXTROSE MONOHYDRATE 25 G/50ML
25-50 INJECTION, SOLUTION INTRAVENOUS
Status: DISCONTINUED | OUTPATIENT
Start: 2023-12-21 | End: 2023-12-23 | Stop reason: HOSPADM

## 2023-12-21 RX ORDER — FENTANYL CITRATE 50 UG/ML
50 INJECTION, SOLUTION INTRAMUSCULAR; INTRAVENOUS EVERY 5 MIN PRN
Status: DISCONTINUED | OUTPATIENT
Start: 2023-12-21 | End: 2023-12-21

## 2023-12-21 RX ORDER — PRENATAL VIT/IRON FUM/FOLIC AC 27MG-0.8MG
1 TABLET ORAL DAILY
Status: DISCONTINUED | OUTPATIENT
Start: 2023-12-21 | End: 2023-12-23 | Stop reason: HOSPADM

## 2023-12-21 RX ORDER — MISOPROSTOL 200 UG/1
800 TABLET ORAL
Status: DISCONTINUED | OUTPATIENT
Start: 2023-12-21 | End: 2023-12-23 | Stop reason: HOSPADM

## 2023-12-21 RX ORDER — MODIFIED LANOLIN
OINTMENT (GRAM) TOPICAL
Status: DISCONTINUED | OUTPATIENT
Start: 2023-12-21 | End: 2023-12-23 | Stop reason: HOSPADM

## 2023-12-21 RX ORDER — ONDANSETRON 2 MG/ML
4 INJECTION INTRAMUSCULAR; INTRAVENOUS EVERY 30 MIN PRN
Status: DISCONTINUED | OUTPATIENT
Start: 2023-12-21 | End: 2023-12-21

## 2023-12-21 RX ORDER — CITRIC ACID/SODIUM CITRATE 334-500MG
30 SOLUTION, ORAL ORAL
Status: COMPLETED | OUTPATIENT
Start: 2023-12-21 | End: 2023-12-21

## 2023-12-21 RX ORDER — LIDOCAINE 40 MG/G
CREAM TOPICAL
Status: DISCONTINUED | OUTPATIENT
Start: 2023-12-21 | End: 2023-12-21 | Stop reason: HOSPADM

## 2023-12-21 RX ORDER — KETOROLAC TROMETHAMINE 30 MG/ML
30 INJECTION, SOLUTION INTRAMUSCULAR; INTRAVENOUS EVERY 6 HOURS
Qty: 3 ML | Refills: 0 | Status: COMPLETED | OUTPATIENT
Start: 2023-12-21 | End: 2023-12-22

## 2023-12-21 RX ORDER — OXYTOCIN 10 [USP'U]/ML
10 INJECTION, SOLUTION INTRAMUSCULAR; INTRAVENOUS
Status: DISCONTINUED | OUTPATIENT
Start: 2023-12-21 | End: 2023-12-23 | Stop reason: HOSPADM

## 2023-12-21 RX ORDER — HYDROMORPHONE HCL IN WATER/PF 6 MG/30 ML
0.4 PATIENT CONTROLLED ANALGESIA SYRINGE INTRAVENOUS EVERY 5 MIN PRN
Status: DISCONTINUED | OUTPATIENT
Start: 2023-12-21 | End: 2023-12-21

## 2023-12-21 RX ORDER — SODIUM CHLORIDE, SODIUM LACTATE, POTASSIUM CHLORIDE, CALCIUM CHLORIDE 600; 310; 30; 20 MG/100ML; MG/100ML; MG/100ML; MG/100ML
INJECTION, SOLUTION INTRAVENOUS CONTINUOUS
Status: DISCONTINUED | OUTPATIENT
Start: 2023-12-21 | End: 2023-12-21 | Stop reason: HOSPADM

## 2023-12-21 RX ORDER — ONDANSETRON 4 MG/1
4 TABLET, ORALLY DISINTEGRATING ORAL EVERY 30 MIN PRN
Status: DISCONTINUED | OUTPATIENT
Start: 2023-12-21 | End: 2023-12-21

## 2023-12-21 RX ORDER — HYDROCORTISONE 25 MG/G
CREAM TOPICAL 3 TIMES DAILY PRN
Status: DISCONTINUED | OUTPATIENT
Start: 2023-12-21 | End: 2023-12-23 | Stop reason: HOSPADM

## 2023-12-21 RX ORDER — KETOROLAC TROMETHAMINE 30 MG/ML
INJECTION, SOLUTION INTRAMUSCULAR; INTRAVENOUS PRN
Status: DISCONTINUED | OUTPATIENT
Start: 2023-12-21 | End: 2023-12-21

## 2023-12-21 RX ORDER — PROCHLORPERAZINE 25 MG
25 SUPPOSITORY, RECTAL RECTAL EVERY 12 HOURS PRN
Status: DISCONTINUED | OUTPATIENT
Start: 2023-12-21 | End: 2023-12-23 | Stop reason: HOSPADM

## 2023-12-21 RX ORDER — ACETAMINOPHEN 325 MG/1
975 TABLET ORAL EVERY 6 HOURS
Status: DISCONTINUED | OUTPATIENT
Start: 2023-12-21 | End: 2023-12-23 | Stop reason: HOSPADM

## 2023-12-21 RX ORDER — METOCLOPRAMIDE 10 MG/1
10 TABLET ORAL EVERY 6 HOURS PRN
Status: DISCONTINUED | OUTPATIENT
Start: 2023-12-21 | End: 2023-12-23 | Stop reason: HOSPADM

## 2023-12-21 RX ORDER — IBUPROFEN 800 MG/1
800 TABLET, FILM COATED ORAL EVERY 6 HOURS
Status: DISCONTINUED | OUTPATIENT
Start: 2023-12-22 | End: 2023-12-22

## 2023-12-21 RX ORDER — DIPHENHYDRAMINE HCL 25 MG
25 CAPSULE ORAL EVERY 6 HOURS PRN
Status: DISCONTINUED | OUTPATIENT
Start: 2023-12-21 | End: 2023-12-21

## 2023-12-21 RX ORDER — CARBOPROST TROMETHAMINE 250 UG/ML
250 INJECTION, SOLUTION INTRAMUSCULAR
Status: DISCONTINUED | OUTPATIENT
Start: 2023-12-21 | End: 2023-12-21 | Stop reason: HOSPADM

## 2023-12-21 RX ORDER — OXYCODONE HYDROCHLORIDE 5 MG/1
5 TABLET ORAL EVERY 4 HOURS PRN
Status: DISCONTINUED | OUTPATIENT
Start: 2023-12-21 | End: 2023-12-23 | Stop reason: HOSPADM

## 2023-12-21 RX ORDER — AMOXICILLIN 250 MG
1 CAPSULE ORAL 2 TIMES DAILY
Status: DISCONTINUED | OUTPATIENT
Start: 2023-12-21 | End: 2023-12-23 | Stop reason: HOSPADM

## 2023-12-21 RX ORDER — FENTANYL CITRATE 50 UG/ML
15 INJECTION, SOLUTION INTRAMUSCULAR; INTRAVENOUS ONCE
Status: COMPLETED | OUTPATIENT
Start: 2023-12-21 | End: 2023-12-21

## 2023-12-21 RX ORDER — FENTANYL CITRATE 50 UG/ML
25 INJECTION, SOLUTION INTRAMUSCULAR; INTRAVENOUS EVERY 5 MIN PRN
Status: DISCONTINUED | OUTPATIENT
Start: 2023-12-21 | End: 2023-12-21

## 2023-12-21 RX ORDER — MISOPROSTOL 200 UG/1
800 TABLET ORAL
Status: DISCONTINUED | OUTPATIENT
Start: 2023-12-21 | End: 2023-12-21 | Stop reason: HOSPADM

## 2023-12-21 RX ORDER — TRANEXAMIC ACID 10 MG/ML
1 INJECTION, SOLUTION INTRAVENOUS EVERY 30 MIN PRN
Status: DISCONTINUED | OUTPATIENT
Start: 2023-12-21 | End: 2023-12-21 | Stop reason: HOSPADM

## 2023-12-21 RX ORDER — LIDOCAINE 40 MG/G
CREAM TOPICAL
Status: DISCONTINUED | OUTPATIENT
Start: 2023-12-21 | End: 2023-12-23 | Stop reason: HOSPADM

## 2023-12-21 RX ORDER — DIMENHYDRINATE 50 MG/ML
25 INJECTION, SOLUTION INTRAMUSCULAR; INTRAVENOUS
Status: DISCONTINUED | OUTPATIENT
Start: 2023-12-21 | End: 2023-12-21

## 2023-12-21 RX ORDER — NALOXONE HYDROCHLORIDE 0.4 MG/ML
0.4 INJECTION, SOLUTION INTRAMUSCULAR; INTRAVENOUS; SUBCUTANEOUS
Status: DISCONTINUED | OUTPATIENT
Start: 2023-12-21 | End: 2023-12-23 | Stop reason: HOSPADM

## 2023-12-21 RX ORDER — DIPHENHYDRAMINE HYDROCHLORIDE 50 MG/ML
25 INJECTION INTRAMUSCULAR; INTRAVENOUS EVERY 6 HOURS PRN
Status: DISCONTINUED | OUTPATIENT
Start: 2023-12-21 | End: 2023-12-21

## 2023-12-21 RX ORDER — CEFAZOLIN SODIUM/WATER 2 G/20 ML
2 SYRINGE (ML) INTRAVENOUS
Status: COMPLETED | OUTPATIENT
Start: 2023-12-21 | End: 2023-12-21

## 2023-12-21 RX ORDER — CEFAZOLIN SODIUM/WATER 2 G/20 ML
2 SYRINGE (ML) INTRAVENOUS SEE ADMIN INSTRUCTIONS
Status: DISCONTINUED | OUTPATIENT
Start: 2023-12-21 | End: 2023-12-21 | Stop reason: HOSPADM

## 2023-12-21 RX ORDER — MISOPROSTOL 200 UG/1
400 TABLET ORAL
Status: DISCONTINUED | OUTPATIENT
Start: 2023-12-21 | End: 2023-12-23 | Stop reason: HOSPADM

## 2023-12-21 RX ORDER — CARBOPROST TROMETHAMINE 250 UG/ML
250 INJECTION, SOLUTION INTRAMUSCULAR
Status: DISCONTINUED | OUTPATIENT
Start: 2023-12-21 | End: 2023-12-23 | Stop reason: HOSPADM

## 2023-12-21 RX ORDER — LABETALOL HYDROCHLORIDE 5 MG/ML
10 INJECTION, SOLUTION INTRAVENOUS
Status: DISCONTINUED | OUTPATIENT
Start: 2023-12-21 | End: 2023-12-21

## 2023-12-21 RX ORDER — METOCLOPRAMIDE HYDROCHLORIDE 5 MG/ML
10 INJECTION INTRAMUSCULAR; INTRAVENOUS EVERY 6 HOURS PRN
Status: DISCONTINUED | OUTPATIENT
Start: 2023-12-21 | End: 2023-12-23 | Stop reason: HOSPADM

## 2023-12-21 RX ORDER — TRANEXAMIC ACID 10 MG/ML
1 INJECTION, SOLUTION INTRAVENOUS EVERY 30 MIN PRN
Status: DISCONTINUED | OUTPATIENT
Start: 2023-12-21 | End: 2023-12-23 | Stop reason: HOSPADM

## 2023-12-21 RX ORDER — OXYTOCIN/0.9 % SODIUM CHLORIDE 30/500 ML
340 PLASTIC BAG, INJECTION (ML) INTRAVENOUS CONTINUOUS PRN
Status: COMPLETED | OUTPATIENT
Start: 2023-12-21 | End: 2023-12-21

## 2023-12-21 RX ORDER — PROCHLORPERAZINE MALEATE 10 MG
10 TABLET ORAL EVERY 6 HOURS PRN
Status: DISCONTINUED | OUTPATIENT
Start: 2023-12-21 | End: 2023-12-23 | Stop reason: HOSPADM

## 2023-12-21 RX ORDER — METHYLERGONOVINE MALEATE 0.2 MG/ML
200 INJECTION INTRAVENOUS
Status: DISCONTINUED | OUTPATIENT
Start: 2023-12-21 | End: 2023-12-23 | Stop reason: HOSPADM

## 2023-12-21 RX ORDER — GABAPENTIN 300 MG/1
300 CAPSULE ORAL
Status: COMPLETED | OUTPATIENT
Start: 2023-12-21 | End: 2023-12-21

## 2023-12-21 RX ORDER — ONDANSETRON 2 MG/ML
4 INJECTION INTRAMUSCULAR; INTRAVENOUS EVERY 6 HOURS PRN
Status: DISCONTINUED | OUTPATIENT
Start: 2023-12-21 | End: 2023-12-23 | Stop reason: HOSPADM

## 2023-12-21 RX ORDER — BUPIVACAINE HYDROCHLORIDE 7.5 MG/ML
INJECTION, SOLUTION INTRASPINAL PRN
Status: DISCONTINUED | OUTPATIENT
Start: 2023-12-21 | End: 2023-12-21

## 2023-12-21 RX ORDER — SODIUM CHLORIDE, SODIUM LACTATE, POTASSIUM CHLORIDE, CALCIUM CHLORIDE 600; 310; 30; 20 MG/100ML; MG/100ML; MG/100ML; MG/100ML
INJECTION, SOLUTION INTRAVENOUS CONTINUOUS
Status: DISCONTINUED | OUTPATIENT
Start: 2023-12-21 | End: 2023-12-21

## 2023-12-21 RX ORDER — OXYTOCIN/0.9 % SODIUM CHLORIDE 30/500 ML
340 PLASTIC BAG, INJECTION (ML) INTRAVENOUS CONTINUOUS PRN
Status: DISCONTINUED | OUTPATIENT
Start: 2023-12-21 | End: 2023-12-23 | Stop reason: HOSPADM

## 2023-12-21 RX ORDER — FENTANYL CITRATE-0.9 % NACL/PF 10 MCG/ML
100 PLASTIC BAG, INJECTION (ML) INTRAVENOUS EVERY 5 MIN PRN
Status: DISCONTINUED | OUTPATIENT
Start: 2023-12-21 | End: 2023-12-21 | Stop reason: HOSPADM

## 2023-12-21 RX ORDER — ONDANSETRON 4 MG/1
4 TABLET, ORALLY DISINTEGRATING ORAL EVERY 6 HOURS PRN
Status: DISCONTINUED | OUTPATIENT
Start: 2023-12-21 | End: 2023-12-23 | Stop reason: HOSPADM

## 2023-12-21 RX ORDER — OXYTOCIN 10 [USP'U]/ML
10 INJECTION, SOLUTION INTRAMUSCULAR; INTRAVENOUS
Status: DISCONTINUED | OUTPATIENT
Start: 2023-12-21 | End: 2023-12-21 | Stop reason: HOSPADM

## 2023-12-21 RX ORDER — SIMETHICONE 80 MG
80 TABLET,CHEWABLE ORAL 4 TIMES DAILY PRN
Status: DISCONTINUED | OUTPATIENT
Start: 2023-12-21 | End: 2023-12-23 | Stop reason: HOSPADM

## 2023-12-21 RX ORDER — BISACODYL 10 MG
10 SUPPOSITORY, RECTAL RECTAL DAILY PRN
Status: DISCONTINUED | OUTPATIENT
Start: 2023-12-23 | End: 2023-12-23 | Stop reason: HOSPADM

## 2023-12-21 RX ORDER — DEXTROSE, SODIUM CHLORIDE, SODIUM LACTATE, POTASSIUM CHLORIDE, AND CALCIUM CHLORIDE 5; .6; .31; .03; .02 G/100ML; G/100ML; G/100ML; G/100ML; G/100ML
INJECTION, SOLUTION INTRAVENOUS CONTINUOUS
Status: DISCONTINUED | OUTPATIENT
Start: 2023-12-21 | End: 2023-12-23 | Stop reason: HOSPADM

## 2023-12-21 RX ORDER — MORPHINE SULFATE 0.5 MG/ML
150 INJECTION, SOLUTION EPIDURAL; INTRATHECAL; INTRAVENOUS ONCE
Status: COMPLETED | OUTPATIENT
Start: 2023-12-21 | End: 2023-12-21

## 2023-12-21 RX ORDER — NICOTINE POLACRILEX 4 MG
15-30 LOZENGE BUCCAL
Status: DISCONTINUED | OUTPATIENT
Start: 2023-12-21 | End: 2023-12-23 | Stop reason: HOSPADM

## 2023-12-21 RX ORDER — OXYTOCIN/0.9 % SODIUM CHLORIDE 30/500 ML
100-340 PLASTIC BAG, INJECTION (ML) INTRAVENOUS CONTINUOUS PRN
Status: DISCONTINUED | OUTPATIENT
Start: 2023-12-21 | End: 2023-12-23 | Stop reason: HOSPADM

## 2023-12-21 RX ADMIN — FENTANYL CITRATE 15 MCG: 50 INJECTION INTRAMUSCULAR; INTRAVENOUS at 11:00

## 2023-12-21 RX ADMIN — KETOROLAC TROMETHAMINE 15 MG: 30 INJECTION, SOLUTION INTRAMUSCULAR at 11:47

## 2023-12-21 RX ADMIN — ACETAMINOPHEN 975 MG: 325 TABLET, FILM COATED ORAL at 23:39

## 2023-12-21 RX ADMIN — MORPHINE SULFATE 150 MCG: 0.5 INJECTION, SOLUTION EPIDURAL; INTRATHECAL; INTRAVENOUS at 11:00

## 2023-12-21 RX ADMIN — Medication 340 ML/HR: at 11:30

## 2023-12-21 RX ADMIN — KETOROLAC TROMETHAMINE 30 MG: 30 INJECTION, SOLUTION INTRAMUSCULAR at 18:34

## 2023-12-21 RX ADMIN — BUPIVACAINE HYDROCHLORIDE IN DEXTROSE 1.6 ML: 7.5 INJECTION, SOLUTION SUBARACHNOID at 11:00

## 2023-12-21 RX ADMIN — SODIUM CITRATE AND CITRIC ACID MONOHYDRATE 30 ML: 500; 334 SOLUTION ORAL at 10:48

## 2023-12-21 RX ADMIN — PHENYLEPHRINE HYDROCHLORIDE 40 MCG/MIN: 10 INJECTION INTRAVENOUS at 11:03

## 2023-12-21 RX ADMIN — GABAPENTIN 300 MG: 300 CAPSULE ORAL at 10:48

## 2023-12-21 RX ADMIN — PROCHLORPERAZINE EDISYLATE 10 MG: 5 INJECTION INTRAMUSCULAR; INTRAVENOUS at 13:19

## 2023-12-21 RX ADMIN — METOCLOPRAMIDE HYDROCHLORIDE 10 MG: 5 INJECTION INTRAMUSCULAR; INTRAVENOUS at 18:46

## 2023-12-21 RX ADMIN — PRENATAL VITAMINS-IRON FUMARATE 27 MG IRON-FOLIC ACID 0.8 MG TABLET 1 TABLET: at 18:34

## 2023-12-21 RX ADMIN — Medication 2 G: at 10:53

## 2023-12-21 RX ADMIN — OXYCODONE HYDROCHLORIDE 5 MG: 5 TABLET ORAL at 20:32

## 2023-12-21 RX ADMIN — SENNOSIDES AND DOCUSATE SODIUM 1 TABLET: 8.6; 5 TABLET ORAL at 20:31

## 2023-12-21 RX ADMIN — ACETAMINOPHEN 975 MG: 325 TABLET, FILM COATED ORAL at 10:47

## 2023-12-21 RX ADMIN — SODIUM CHLORIDE, POTASSIUM CHLORIDE, SODIUM LACTATE AND CALCIUM CHLORIDE: 600; 310; 30; 20 INJECTION, SOLUTION INTRAVENOUS at 10:54

## 2023-12-21 RX ADMIN — ONDANSETRON 4 MG: 2 INJECTION INTRAMUSCULAR; INTRAVENOUS at 11:50

## 2023-12-21 RX ADMIN — ONDANSETRON 4 MG: 2 INJECTION INTRAMUSCULAR; INTRAVENOUS at 11:06

## 2023-12-21 RX ADMIN — ACETAMINOPHEN 975 MG: 325 TABLET, FILM COATED ORAL at 18:33

## 2023-12-21 ASSESSMENT — ACTIVITIES OF DAILY LIVING (ADL)
ADLS_ACUITY_SCORE: 18
ADLS_ACUITY_SCORE: 33
ADLS_ACUITY_SCORE: 18

## 2023-12-21 NOTE — H&P
[facility] OB/GYN Department    History and Physical Note    Asmita Frye  1990  [id number]    HPI: Asmita Frye is a 33 year old  at 37w4d by 7w1d US, who presents for scheduled repeat C section. Reports good fetal movement. No regular contractions, no loss of fluid, no vaginal bleeding.     Pregnancy notable for:  Hx C section x2  GDMA2 on insulin  AMA  Obesity  Anemia    OBHX:   OB History    Para Term  AB Living   3 2 2 0 0 2   SAB IAB Ectopic Multiple Live Births   0 0 0 0 2      # Outcome Date GA Lbr [place]/2nd Weight Sex Delivery Anes PTL Lv   3 Current            2 Term 16 39w0d  3.43 kg (7 lb 9 oz) M CS-Unspec   ROYAL      Birth Comments: CAN      Name: Mario Barrientos Term 12/29/10 40w0d  3.629 kg (8 lb) M CS-Unspec   ROYAL      Complications: Failure to Progress in Second Stage      Name: Roman       MedicalHX:   Past Medical History:   Diagnosis Date    Chickenpox        SurgicalHX:   Past Surgical History:   Procedure Laterality Date    APPENDECTOMY         Medications:   No current facility-administered medications on file prior to encounter.  acetone urine (KETOSTIX) test strip, Use 1 strip daily to check urine ketones.  blood glucose (NO BRAND SPECIFIED) test strip, Use to test blood sugar 4 times daily or as directed. To accompany: Blood Glucose Monitor Brands: per insurance.  blood glucose (ONETOUCH ULTRA) test strip, Use to test blood sugar 4 times daily or as directed.  blood glucose monitoring (NO BRAND SPECIFIED) meter device kit, Use to test blood sugar 4 times daily or as directed. Preferred blood glucose meter OR supplies to accompany: Blood Glucose Monitor Brands: per insurance.  insulin pen needle (32G X 4 MM) 32G X 4 MM miscellaneous, Use 1 pen needles daily or as directed.  metoclopramide (REGLAN) 5 MG tablet, Take 1 tablet (5 mg) by mouth 3 times daily as needed (for nausea and vomiting)  omeprazole (PRILOSEC) 40 MG DR capsule, Take 1  capsule (40 mg) by mouth daily  Prenatal Vit-Fe Fumarate-FA (PRENATAL MULTIVITAMIN W/IRON) 27-0.8 MG tablet, Take 1 tablet by mouth daily  Prenatal Vit-Fe Fumarate-FA (PRENATAL MULTIVITAMIN W/IRON) 27-0.8 MG tablet, Take 1 tablet by mouth daily  thin (NO BRAND SPECIFIED) lancets, Use with lancing device to check glucose 4 times/day. To accompany: Blood Glucose Monitor Brands: per insurance.        Allergies:  No Known Allergies    FamilyHX:    Family History   Problem Relation Age of Onset    Heart Disease Father        SocialHX:   Social History     Socioeconomic History    Marital status:    Tobacco Use    Smoking status: Former     Types: Cigarettes     Quit date: 2023     Years since quittin.8    Smokeless tobacco: Never   Vaping Use    Vaping Use: Never used   Substance and Sexual Activity    Alcohol use: Not Currently    Drug use: Never       ROS: 10-point ROS negative except as in HPI     Physical Exam:  There were no vitals filed for this visit.  GEN: AOA x3  CV: No heaves or thrills. No peripheral varicosities  PULM: Equal expansion bilaterally, no accessory muscle use  ABD: soft, gravid, non-tender, non-distended  EXT: no edema        Labs:        Lab Results   Component Value Date    AS Negative 2023    HEPBANG Nonreactive 2023    HGB 10.2 (L) 2023       GBS Status: positive    CBC RESULTS:   Recent Labs   Lab Test 23  0923   WBC 7.4   RBC 3.44*   HGB 10.2*   HCT 31.7*   MCV 92   MCH 29.7   MCHC 32.2   RDW 15.1*                A/P: Asmita Frye is a 33 year old female  at 37w4d, here for scheduled repeat C section.    Admit to L&D. Place PIV. Admission labs. Routine pre operative prep. Reviewed procedure and expected post surgical course. All questions answered.    GDMA2: took evening insulin last night. Will continue to monitor blood sugars per protocol post partum.      Melodie Sesay DO

## 2023-12-21 NOTE — ANESTHESIA PROCEDURE NOTES
"Intrathecal injection Procedure Note    Pre-Procedure   Staff -        CRNA: Stevo Mckeon APRN CRNA       Performed By: CRNA       Location: OB       Pre-Anesthestic Checklist: patient identified, IV checked, risks and benefits discussed, informed consent, monitors and equipment checked, pre-op evaluation, at physician/surgeon's request and post-op pain management  Timeout:       Correct Patient: Yes        Correct Procedure: Yes        Correct Site: Yes        Correct Position: Yes   Procedure Documentation  Procedure: intrathecal injection       Patient Position: sitting       Patient Prep/Sterile Barriers: sterile gloves, mask, patient draped       Skin prep: Chloraprep       Insertion Site: L3-4. (midline approach).       Needle Gauge: 25.        Needle Length (Inches): 3.5        Spinal Needle Type: Pencan       Introducer used       Introducer: 20 G       # of attempts: 1 and  # of redirects:  0    Assessment/Narrative         Paresthesias: No.       CSF fluid: clear.       Opening pressure was cmH2O while  Sitting.        FOR [facility] ([facility] ONLY:   Pain Team Contact information: please page the Pain Team Via [email or web address] Search \"Pain\". During daytime hours, please page the attending first. At night please page the resident first.      " today

## 2023-12-21 NOTE — PLAN OF CARE
Complaining of nausea and had emesis. Compazine given with relief, able to tolerate  solid foods. Holding and nursing infant skin to skin.

## 2023-12-21 NOTE — PLAN OF CARE
Patient arrived to 2044 for scheduled c-sec. Category 1 fetal monitor tracing, mod variability with accels. IV placed in left hand, bolus running. Procedure explained, questions answered, permit signed. To OR ambulatory at 1100.

## 2023-12-21 NOTE — OP NOTE
[facility]  Section Operative Note    Patient Name: Asmita Frye  MRN:[id number]  : 1990  Date of Surgery: 23   Pre-operative diagnosis:  IUP at 37w4d  History of  section complicating pregnancy [O34.219]   Post-operative diagnosis:  S/p repeat C section   Procedure:  Procedure(s):   SECTION   Surgeon:  Dr. Melodie Sesay (OB/GYN Attending Staff)    Assistant(s):  Monica Coreas NP  The assistant was required for retraction in order to enter the abdomen, instrument and suture handling as well as fundal pressure for delivery of the baby.   Anesthesia:  Spinal   Quantitative blood loss:  226cc   Total IV fluids:  1,200ml crystaloid    Blood transfusion:  No transfusion was given during surgery    Total urine output:  175 mL    Drains:  Barcenas catheter   Specimens:  ID Type Source Tests Collected by Time Destination   A : placenta Placenta Placenta OR DOCUMENTATION ONLY Melodie Sesay,  2023 10:34 AM       Findings:  Viable male , in TAY presentation.  Apgars of 9 and 9 at 1 and 5 minutes respectively.  Clear fluid with amniotomy. Loose nuchal was reduced, no compound presentation. Moderate abdominal wall adhesions,  intraabdominal adhesions noted of the omentum to the anterior abdominal wall and filmy bladder adhesions to the anterior abdominal wall. Normal appearing uterus with thin lower uterine segment. Left fallopian tube with small paratubal cysts, normal appearing ovary. Right adnexa was unable to be visualized due to adhesions but palpably normal.  Hemostatic surgical site at the end of the case.    Complications:  None apparent   Condition:  Stable, transferred to PACU        Indication: Patient is a 33 year old  at 37w4d who presented for scheduled repeat C section in setting of GDMA2 on insulin.     Prior to procedure risks benefits, complications, and alternatives were discussed with the patient.  Verbal and  written consent were obtained.     Procedure:  The patient was taken to the operating room where spinal anesthesia was administered and found to be adequate.  Barcenas catheter was placed. She was then prepped and draped in the standard sterile fashion, in the dorsal supine position with slight leftward tilt.  A time out was performed to confirm correct patient and procedure. After testing for adequate anesthesia, a pfannenstiel skin incision was made and carried through to the underlying layer of fascia.  The fascia was then incised in the midline and the incision extended laterally with Cervantes scissors.  The superior aspect of the fascial incision was grasped with kocher clamps and elevated, the underlying rectus muscle was dissected off.  In a similar fashion the inferior aspect of the fascial incision was grasped with Kocher clamps, elevated from the rectus muscle and the underlying rectus muscle dissected off. Moderate adhesions noted in this layer. The rectus muscles were then  in the midline, the peritoneum identified, and entered bluntly.  This incision was then extended laterally with gentle traction.  Intraabdominal adhesions were noted of the omentum to the anterior abdominal wall. Adhesions also noted from the uterus to the anterior abdominal wall and pelvic side wall. The uterine adhesions were taken down with electrocautery. The kamilla retractor was then placed for visualization.     The lower uterine segment was then incised in a transverse fashion with the second scalpel.  The uterine incision was extended laterally with gentle traction.  The infant's head was then delivered atraumatically.  The nose and mouth were bulb suctioned, the cord was doubly clamped and cut, and the infant handed off the waiting pediatric personal.     The placenta was then delivered via gentle cord traction.  The uterus was cleared of all clots and debris.  The uterine incision was then closed using a #0 Vicryl in a  running interlocked fashion.  A second layer of the same suture was then utilized to obtain excellent hemostasis with an imbricating layer.  The gutters were cleared of all clots and debris, with visualization of the left adnexa. Right adnexa was not visible due to adhesions but palpably normal. Attention was then turned to the fascial incision which was then re-approximated using a #0 Vicryl in a running fashion.  The subcutaneous tissues were then closed using a #3-0 plain gut, with the skin being closed using a #4-0 undyed Vicryl.    The patient tolerated the procedure well.  All sponge, lap and needle counts were correct times two.  The patient received Kefzol prior to skin incision.  At the conclusion of the procedure a debrief was performed.  The patient was taken to recovery in stable condition.           Melodie Sesay DO

## 2023-12-21 NOTE — ANESTHESIA CARE TRANSFER NOTE
Patient: Asmita Frye    Procedure: Procedure(s):   SECTION       Diagnosis: History of  section complicating pregnancy [O34.219]  Diagnosis Additional Information: No value filed.    Anesthesia Type:   Spinal     Note:    Oropharynx: oropharynx clear of all foreign objects  Level of Consciousness: awake  Oxygen Supplementation: room air    Independent Airway: airway patency satisfactory and stable  Dentition: dentition unchanged  Vital Signs Stable: post-procedure vital signs reviewed and stable  Report to RN Given: handoff report given  Patient transferred to: Labor and Delivery    Handoff Report: Identifed the Patient, Identified the Reponsible Provider, Reviewed the pertinent medical history, Discussed the surgical course, Reviewed Intra-OP anesthesia mangement and issues during anesthesia, Set expectations for post-procedure period and Allowed opportunity for questions and acknowledgement of understanding      Vitals:  Vitals Value Taken Time   /62 23 1247   Temp 36.7  C (98  F) 23 1215   Pulse 81 23 1247   Resp 16 23 1200   SpO2 95 % 23 1300   Vitals shown include unfiled device data.    Electronically Signed By: MERVAT Dalal CRNA  2023  1:01 PM

## 2023-12-21 NOTE — ANESTHESIA POSTPROCEDURE EVALUATION
Patient: Asmita Frye    Procedure: Procedure(s):   SECTION       Anesthesia Type:  Spinal    Note:  Disposition: Inpatient   Postop Pain Control: Uneventful            Sign Out: Well controlled pain   PONV: No   Neuro/Psych: Uneventful            Sign Out: Acceptable/Baseline neuro status   Airway/Respiratory: Uneventful            Sign Out: Acceptable/Baseline resp. status   CV/Hemodynamics: Uneventful            Sign Out: Acceptable CV status; No obvious hypovolemia; No obvious fluid overload   Other NRE: NONE   DID A NON-ROUTINE EVENT OCCUR? No           Last vitals:  Vitals:    23 1300 23 1315 23 1330   BP: 104/47 100/44 98/54   Pulse: 69 68    Resp:   16   Temp:      SpO2: 95% 94% 93%       Electronically Signed By: MERVAT Dalal CRNA  2023  1:47 PM

## 2023-12-21 NOTE — BRIEF OP NOTE
[facility]    Brief Operative Note    Pre-operative diagnosis: History of  section complicating pregnancy [O34.219]  Post-operative diagnosis S/p repeat C section    Procedure:  SECTION, N/A - Abdomen    Surgeon: Surgeon(s) and Role:     * Melodie Sesay DO - Primary  Anesthesia: Spinal   Quantitative Blood Loss: 226cc    Drains: Barcenas   Specimens:   ID Type Source Tests Collected by Time Destination   A : placenta Placenta Placenta OR DOCUMENTATION ONLY Melodie Sesay DO 2023 10:34 AM      Findings:   Viable male  in TAY presentation. Intraabdominal adhesions with omentum to the anterior abdominal wall and bladder flap adhesions .  Complications: None.  Implants: * No implants in log *      Melodie Sesay DO

## 2023-12-21 NOTE — PROGRESS NOTES
"S:Delivery  B:No Labor,37w4d    No results found for: \"GBS\" with antibiotic treatment not indicated 4 hours prior to delivery.  A: Patient delivered Repeat C/S at 1129 with Dr. JOANNA Sesay in attendance and baby placed on mother's abdomen for delayed cord clamping. Baby received from surgeon. Baby to warmer for drying and wrapped in blanket. Apgars 9/9. Delivery .  IV infusion of Oxytocin  infused. Placenta removal manual. MD does not want placenta sent to pathology.  See Flowsheet for VS and PP checks.  .  Labor care plan goals met, transition now to postpartum care.  R: Expect routine postpartum care. Anticipate first feeding within the hour or whenever infant displays feeding cues. Continue skin to skin. Prior discussion with mother indicates that feeding plan is Breast feeding . Educated mother on importance of exclusive breastfeeding, expected feeding readiness cues and encouraged her to observe for these cues while rooming in. Informed her that breastfeeding assistance would be provided.  "

## 2023-12-22 LAB
GLUCOSE BLDC GLUCOMTR-MCNC: 99 MG/DL (ref 70–99)
HGB BLD-MCNC: 9 G/DL (ref 11.7–15.7)

## 2023-12-22 PROCEDURE — 120N000001 HC R&B MED SURG/OB

## 2023-12-22 PROCEDURE — 36415 COLL VENOUS BLD VENIPUNCTURE: CPT | Performed by: OBSTETRICS & GYNECOLOGY

## 2023-12-22 PROCEDURE — 85018 HEMOGLOBIN: CPT | Performed by: OBSTETRICS & GYNECOLOGY

## 2023-12-22 PROCEDURE — 250N000011 HC RX IP 250 OP 636: Performed by: OBSTETRICS & GYNECOLOGY

## 2023-12-22 PROCEDURE — 250N000013 HC RX MED GY IP 250 OP 250 PS 637: Performed by: OBSTETRICS & GYNECOLOGY

## 2023-12-22 RX ORDER — IBUPROFEN 800 MG/1
800 TABLET, FILM COATED ORAL EVERY 6 HOURS
Status: DISCONTINUED | OUTPATIENT
Start: 2023-12-22 | End: 2023-12-23 | Stop reason: HOSPADM

## 2023-12-22 RX ADMIN — IBUPROFEN 800 MG: 800 TABLET ORAL at 08:04

## 2023-12-22 RX ADMIN — OXYCODONE HYDROCHLORIDE 5 MG: 5 TABLET ORAL at 08:08

## 2023-12-22 RX ADMIN — KETOROLAC TROMETHAMINE 30 MG: 30 INJECTION, SOLUTION INTRAMUSCULAR at 02:06

## 2023-12-22 RX ADMIN — IBUPROFEN 800 MG: 800 TABLET ORAL at 19:59

## 2023-12-22 RX ADMIN — SENNOSIDES AND DOCUSATE SODIUM 1 TABLET: 8.6; 5 TABLET ORAL at 08:04

## 2023-12-22 RX ADMIN — ACETAMINOPHEN 975 MG: 325 TABLET, FILM COATED ORAL at 11:50

## 2023-12-22 RX ADMIN — ACETAMINOPHEN 975 MG: 325 TABLET, FILM COATED ORAL at 05:38

## 2023-12-22 RX ADMIN — OXYCODONE HYDROCHLORIDE 5 MG: 5 TABLET ORAL at 18:25

## 2023-12-22 RX ADMIN — OXYCODONE HYDROCHLORIDE 5 MG: 5 TABLET ORAL at 14:07

## 2023-12-22 RX ADMIN — ACETAMINOPHEN 975 MG: 325 TABLET, FILM COATED ORAL at 18:25

## 2023-12-22 RX ADMIN — SENNOSIDES AND DOCUSATE SODIUM 2 TABLET: 50; 8.6 TABLET ORAL at 19:59

## 2023-12-22 RX ADMIN — PRENATAL VITAMINS-IRON FUMARATE 27 MG IRON-FOLIC ACID 0.8 MG TABLET 1 TABLET: at 08:04

## 2023-12-22 RX ADMIN — IBUPROFEN 800 MG: 800 TABLET ORAL at 14:07

## 2023-12-22 ASSESSMENT — ACTIVITIES OF DAILY LIVING (ADL)
ADLS_ACUITY_SCORE: 18

## 2023-12-22 NOTE — PROGRESS NOTES
[facility] Department    Post-Partum Progress Note: PPD #1    Name: Asmita Frye  Date: 12/22/2023    Subjective:   Patient seen and examined.  No complaints.  Pain well controlled.  Tolerating regular diet, no nausea or vomiting.  Ambulating and voiding without difficulty.  Lochia mild.  Breast feeding.     ROS:    General/Constitutional:  Denies chills or fever  Respiratory: Denies shortness of breath  Cardiovascular: Denies chest pain  Gastrointestinal:  +mild uterine cramping, no nausea or vomiting  Genitourinary: Denies difficulty urinating  Musculoskeletal: Denies  peripheral edema      Objective:     Intake/Output Summary (Last 24 hours) at 12/22/2023 0929  Last data filed at 12/22/2023 0214  Gross per 24 hour   Intake 900 ml   Output 1454 ml   Net -554 ml       Patient Vitals for the past 24 hrs:   BP Temp Temp src Pulse Resp SpO2   12/22/23 0819 98/51 98.7  F (37.1  C) Oral 80 16 --   12/22/23 0640 102/50 98.7  F (37.1  C) Oral 79 16 96 %   12/22/23 0232 109/49 98  F (36.7  C) Oral 66 16 96 %   12/21/23 2037 104/51 97.8  F (36.6  C) Oral 77 16 95 %   12/21/23 1623 111/43 98.2  F (36.8  C) -- 76 16 --   12/21/23 1330 98/54 -- -- 69 16 93 %   12/21/23 1315 100/44 -- -- 68 -- 94 %   12/21/23 1300 104/47 -- -- 69 -- 95 %   12/21/23 1247 107/62 -- -- 81 -- 95 %   12/21/23 1245 107/62 -- -- 81 -- 95 %   12/21/23 1230 117/47 -- -- -- -- 96 %   12/21/23 1215 (!) 117/35 98  F (36.7  C) Oral 78 -- --   12/21/23 1200 112/54 -- -- 75 16 --   12/21/23 1000 112/66 99  F (37.2  C) Oral 88 16 --            Recent Labs   Lab 12/22/23  0626 12/21/23  0923   HGB 9.0* 10.2*       General appearance: well-hydrated, A&O x 3, no apparent distress  ENT: EOMI, sclera anicteric   Lungs: Equal expansion bilaterally, no accessory muscle use  Heart: No heaves or thrills. No peripheral varicosities  Constitutional: See vitals  Abdomen: Soft, non-distended, no rebound or rigidity   Incision: clean, dry,  intact  Uterus: Firm below umbilicus with non-tender fundus   Neurologic: CN II-XII grossly intact, no lateralizing defects, no gross movement abnormalities  Extremities: trace edema, no calf tenderness    Assessment and Plan:    33 year old   s/p  delivery  Routine postpartum cares  GDMA2 - Monitor per protocol    Argenis Robert MD

## 2023-12-22 NOTE — PLAN OF CARE
Data: Vital signs within normal limits. Postpartum checks within normal limits - see flow record. Patient  Is tolerating po intake. She is voiding spontaneously and tolerating activity. Patient declines  service. She says she can understand english if we speak slowly. Patient Is performing self cares and Is able to care for infant. Positive attachment behaviors are observed with infant. Support persons are not present at this time but will be here this afternoon.  Action:  Pain plan was discussed. Pain meds, post  are scheduled and will be brought in when they are due. Additional narcotics will be administered prn. Patient was medicated during the shift for pain. See MAR.   Response:   Patient reassessed within 1 hour after each medication for pain. Patient stated that pain had improved. Patient stated that she was comfortable. .   Plan: Anticipate discharge on 23.

## 2023-12-22 NOTE — PLAN OF CARE
Data: Vital signs within normal limits. Postpartum checks within normal limits - see flow record. Patient  Is tolerating po intake. Patient is able to empty bladder independently. . Patient ambulating independently, has been calling for assistance..   No apparent signs of infection. Incision healing well. Patient Is performing self cares and Is able to care for infant. Positive attachment behaviors are observed with infant. Support persons are not present.  Action:  Pain plan was discussed. Pain meds, post  are scheduled and will be brought in when they are due. Additional narcotics will be administered prn. Patient was medicated during the shift for pain and cramping. See MAR.Patient education done about breastfeeding,  cares, postpartum cares, and pain management/plan. See flow record.  Response:   Patient reassessed within 1 hour after each medication for pain. Patient stated that pain had improved. Patient stated that she was comfortable. .   Plan: Anticipate discharge on .

## 2023-12-23 VITALS
HEART RATE: 81 BPM | RESPIRATION RATE: 18 BRPM | OXYGEN SATURATION: 97 % | BODY MASS INDEX: 38.51 KG/M2 | DIASTOLIC BLOOD PRESSURE: 53 MMHG | TEMPERATURE: 98.4 F | WEIGHT: 203.8 LBS | SYSTOLIC BLOOD PRESSURE: 115 MMHG

## 2023-12-23 PROCEDURE — 250N000013 HC RX MED GY IP 250 OP 250 PS 637: Performed by: OBSTETRICS & GYNECOLOGY

## 2023-12-23 RX ORDER — OXYCODONE HYDROCHLORIDE 5 MG/1
5 TABLET ORAL EVERY 4 HOURS PRN
Qty: 15 TABLET | Refills: 0 | Status: SHIPPED | OUTPATIENT
Start: 2023-12-23 | End: 2024-09-24

## 2023-12-23 RX ORDER — AMOXICILLIN 250 MG
1 CAPSULE ORAL 2 TIMES DAILY PRN
Qty: 30 TABLET | Refills: 1 | Status: SHIPPED | OUTPATIENT
Start: 2023-12-23 | End: 2024-09-24

## 2023-12-23 RX ORDER — IBUPROFEN 800 MG/1
800 TABLET, FILM COATED ORAL EVERY 6 HOURS
Qty: 30 TABLET | Refills: 3 | Status: SHIPPED | OUTPATIENT
Start: 2023-12-23 | End: 2024-09-24

## 2023-12-23 RX ADMIN — OXYCODONE HYDROCHLORIDE 5 MG: 5 TABLET ORAL at 08:34

## 2023-12-23 RX ADMIN — PRENATAL VITAMINS-IRON FUMARATE 27 MG IRON-FOLIC ACID 0.8 MG TABLET 1 TABLET: at 08:23

## 2023-12-23 RX ADMIN — ACETAMINOPHEN 975 MG: 325 TABLET, FILM COATED ORAL at 12:22

## 2023-12-23 RX ADMIN — ACETAMINOPHEN 975 MG: 325 TABLET, FILM COATED ORAL at 00:13

## 2023-12-23 RX ADMIN — IBUPROFEN 800 MG: 800 TABLET ORAL at 02:11

## 2023-12-23 RX ADMIN — OXYCODONE HYDROCHLORIDE 5 MG: 5 TABLET ORAL at 00:13

## 2023-12-23 RX ADMIN — SENNOSIDES AND DOCUSATE SODIUM 1 TABLET: 8.6; 5 TABLET ORAL at 08:23

## 2023-12-23 RX ADMIN — ACETAMINOPHEN 975 MG: 325 TABLET, FILM COATED ORAL at 06:27

## 2023-12-23 RX ADMIN — IBUPROFEN 800 MG: 800 TABLET ORAL at 08:23

## 2023-12-23 RX ADMIN — OXYCODONE HYDROCHLORIDE 5 MG: 5 TABLET ORAL at 04:33

## 2023-12-23 ASSESSMENT — ACTIVITIES OF DAILY LIVING (ADL)
ADLS_ACUITY_SCORE: 18

## 2023-12-23 NOTE — DISCHARGE SUMMARY
[facility]  Delivery Discharge Summary    Admit date: 2023  Discharge date: 2023     Admit Dx:   - 33 year old y/o  at 37w4d  - History of  section, planned repeat  - GDMA2- on insulin   - AMA  - Anemia; acute on chronic blood loss anemia, asymptomatic with expected drop from surgical blood loss     Discharge Dx:  - Same as above    Procedures:  - repeat low transverse  section with double layer closure via Pfannenstiel incision  - spinal analgesia    Admit HPI:  Ms. Asmita Frye is a 33 year old  at 37w4d by 7w1d US who was admitted on 2023 for scheduled repeat  section in the setting of GDMA-2, on insulin.  Uncomplicated  delivery with  ml.   Please see her admit H&P for full details of her PMH, PSH, Meds, Allergies and exam on admit.    Her postoperative course was uncomplicated. On POD#2, she was meeting all of her postpartum goals and deemed stable for discharge. She was voiding without difficulty, tolerating a regular diet without nausea and vomiting, her pain was well controlled on oral pain medicines and her lochia was appropriate. Her hemoglobin after delivery was 9.0. Her Rh status was POS and Rhogam was not indicated. Her postpartum glucose was 99.     Physical exam on the day of discharge:  Vitals:    23 1829 23 2000 23 0015 23 0826   BP: 101/52 107/56 113/59 115/53   BP Location:  Left arm Left arm    Patient Position:  Semi-Thompson's Semi-Thompson's    Cuff Size:  Adult Regular Adult Regular    Pulse: 86 77 81    Resp:  16 16 18   Temp: 97.6  F (36.4  C) 97.9  F (36.6  C) 98.2  F (36.8  C) 98.4  F (36.9  C)   TempSrc: Oral Oral Oral Oral   SpO2: 96% 96% 96% 97%       General: sitting up, alert and cooperative  Abd: soft, non-distended, non-tender. Fundus firm, nontender, 2 cm below umbilicus.   Incision clean/dry/intact with dermabond in place.  Extremities: calves nontender,  "trace edema of lower extremities bilaterally    Lab Results   Component Value Date    HGB 9.0 12/22/2023    HGB 10.2 12/21/2023     Blood type: No results found for: \"RH\"    Discharge/Disposition:  Asmita Frye was discharged to home in stable condition with the following instructions/medications:  1) Call for temperature > 100.4, foul smelling vaginal discharge, bleeding > 1 pad per hour x 2 hrs, pain not controlled by oral pain meds, severe constipation or severe nausea or vomiting.  2) She was instructed to follow-up with her primary OB in 6 weeks for a routine postpartum visit.  3) She was instructed to continue her PNV on discharge if she wished to breast feed her infant.  4) Post-op instructions: reg diet, limit lifting to <20lbs for 6 weeks, pelvic rest (nothing in the vagina for 6 weeks)   5) She was discharged home with the following medications:      Review of your medicines        START taking        Dose / Directions   ibuprofen 800 MG tablet  Commonly known as: ADVIL/MOTRIN      Dose: 800 mg  Take 1 tablet (800 mg) by mouth every 6 hours  Quantity: 30 tablet  Refills: 3     oxyCODONE 5 MG tablet  Commonly known as: ROXICODONE      Dose: 5 mg  Take 1 tablet (5 mg) by mouth every 4 hours as needed for moderate to severe pain  Quantity: 15 tablet  Refills: 0     senna-docusate 8.6-50 MG tablet  Commonly known as: SENOKOT-S/PERICOLACE      Dose: 1 tablet  Take 1 tablet by mouth 2 times daily as needed for constipation  Quantity: 30 tablet  Refills: 1            CONTINUE these medicines which may have CHANGED, or have new prescriptions. If we are uncertain of the size of tablets/capsules you have at home, strength may be listed as something that might have changed.        Dose / Directions   prenatal multivitamin w/iron 27-0.8 MG tablet  This may have changed: Another medication with the same name was removed. Continue taking this medication, and follow the directions you see here.      Dose: 1 " tablet  Take 1 tablet by mouth daily  Refills: 0            CONTINUE these medicines which have NOT CHANGED        Dose / Directions   acetone urine test strip  Commonly known as: KETOSTIX  Used for: Gestational diabetes mellitus (GDM) in third trimester, gestational diabetes method of control unspecified      Use 1 strip daily to check urine ketones.  Quantity: 50 strip  Refills: 1     blood glucose monitoring meter device kit  Commonly known as: NO BRAND SPECIFIED  Used for: Gestational diabetes mellitus (GDM) in third trimester, gestational diabetes method of control unspecified      Use to test blood sugar 4 times daily or as directed. Preferred blood glucose meter OR supplies to accompany: Blood Glucose Monitor Brands: per insurance.  Quantity: 1 kit  Refills: 0     * blood glucose test strip  Commonly known as: NO BRAND SPECIFIED  Used for: Gestational diabetes mellitus (GDM) in third trimester, gestational diabetes method of control unspecified      Use to test blood sugar 4 times daily or as directed. To accompany: Blood Glucose Monitor Brands: per insurance.  Quantity: 150 strip  Refills: 2     * OneTouch Ultra test strip  Used for: Gestational diabetes mellitus (GDM) in third trimester, gestational diabetes method of control unspecified  Generic drug: blood glucose      Use to test blood sugar 4 times daily or as directed.  Quantity: 200 strip  Refills: 4     insulin pen needle 32G X 4 MM miscellaneous  Commonly known as: 32G X 4 MM  Used for: Gestational diabetes mellitus (GDM) in third trimester, gestational diabetes method of control unspecified      Use 1 pen needles daily or as directed.  Quantity: 100 each  Refills: 1     omeprazole 40 MG DR capsule  Commonly known as: PriLOSEC  Used for: Gastroesophageal reflux disease without esophagitis      Dose: 40 mg  Take 1 capsule (40 mg) by mouth daily  Quantity: 90 capsule  Refills: 3     thin lancets  Commonly known as: NO BRAND SPECIFIED  Used for:  Gestational diabetes mellitus (GDM) in third trimester, gestational diabetes method of control unspecified      Use with lancing device to check glucose 4 times/day. To accompany: Blood Glucose Monitor Brands: per insurance.  Quantity: 200 each  Refills: 1           * This list has 2 medication(s) that are the same as other medications prescribed for you. Read the directions carefully, and ask your doctor or other care provider to review them with you.                STOP taking      HumuLIN N KWIKPEN 100 UNIT/ML susp  Generic drug: insulin NPH        metoclopramide 5 MG tablet  Commonly known as: REGLAN                  Where to get your medicines        These medications were sent to [facility] [place], [place] - [address]  [address] [address]      Phone: [phone]   ibuprofen 800 MG tablet  senna-docusate 8.6-50 MG tablet       Some of these will need a paper prescription and others can be bought over the counter. Ask your nurse if you have questions.    Bring a paper prescription for each of these medications  oxyCODONE 5 MG tablet          Barbi Tejeda MD  [facility]

## 2023-12-23 NOTE — PLAN OF CARE
Vitals and assessments have been WDL. Fundus is firm. Bleeding is WDL. Incision is WDL. Pain is being controlled with scheduled Ibuprofen and Tylenol and PRN oxy. Voiding and ambulating independently. [place] and attentive towards infant. Infant has been breastfeeding well and has been cluster feeding throughout the night. Pt requested a pump and was shown how to use it. Pt then wanted formula for infant. Pt was educated on cluster feeding, colostrum, and supplementation. Pt would like to discharge today.

## 2023-12-23 NOTE — DISCHARGE INSTRUCTIONS
Postop  Birth Instructions    MAKE AN APPOINTMENT TO FOLLOW UP WITH YOUR DOCTOR IN 6 WEEKS, SOONER IF PHYSICAL OR EMOTIONAL CONCERNS. [phone]         Birth Discharge Instructions: Lao  Actividad  No levante más de 10 libras kristyn las 6 semanas posteriores a [place] cirugía. Pida a los miembros de [place] pierce y amigos que la ayuden cuando lo necesite.  No conduzca si está tomando píldoras para el dolor recetadas por [place] médico. Puede conducir si está tomando píldoras de venta gordo para el dolor.  No ran ejercicio ni actividades intensas por 6 semanas. No ran nada que requiera un esfuerzo en el sitio de [place] cirugía.  No ran fuerza al usar el baño. [place] equipo de atención podría recetarle un laxante si tiene problemas para  el intestino (estreñimiento).    Para cuidar de [place] incisión:  Mantenga la incisión limpia y seca  No empape [place] incisión en agua. No nade ni use la michelle ni jacuzzis hasta que [place] incisión haya cicatrizado por completo. Puede sentarse en la michelle si el nivel del agua está por debajo de [place] incisión.  Después de lavarse, seque yanira [place] incisión. Incluya la piel que podría entrar en contacto con [place] incisión.  No use agua oxigenada, gel, cremas, lociones ni ungüentos sobre [place] incisión.  Ajuste [place] ropa para evitar la presión en el sitio de [place] cirugía (compruebe el elástico en [place] ropa interior, por ejemplo)  Si tiene Steri-Strips, deje las pequeñas tiras de cinta en [place] sitio. Se caerán solas o puede quitárselas después de teressa semana.    Podría leticia teressa pequeña cantidad de secreción transparente o rosada y esto es normal. Consulte con [place] proveedor de atención médica:  Si el drenaje aumenta o tiene olor.  Si tiene hinchazón, enrojecimiento o teressa erupción alrededor de la incisión.  Si tiene más dolor y [place] medicamento para el dolor no ayuda  Si tiene fiebre de 100.4  F (38  C) o más (temperatura tomada bajo [place] lengua) con o sin escalofríos     El área alrededor de [place] incisión (herida de la  cirugía) se sentirá adormecida. [place] es normal. El adormecimiento debería desaparecer en menos de un año.       Mantenga saul paulo limpias:  Siempre lávese las paulo antes de tocar [place] incisión. [place] ayuda a reducir [place] riesgo de infección. Si saul paulo no están sucias, puede usar un gel de alcohol para limpiarse las paulo. Mantenga saul uñas cortas y limpias.   Llame a [place] proveedor de atención médica si tiene alguno de estos síntomas:  Empapa teressa toalla femenina con orlando en el correr de 1 hora o ve coágulos más grandes que teressa pelota de golf.  Sangrado que dura más de 6 semanas.  Tiene teressa secreción vaginal que huele mal.  Dolor, calambres o sensibilidad graves en la región inferior de [place] vientre.  Necesidad más frecuente o urgente de orinar (hacer pis), o ardor al hacerlo.  Náuseas y vómitos  Enrojecimiento, hinchazón o dolor alrededor de teressa vena en [place] pierna.  Problemas para amamantar o un área enrojecida o dolorosa en [place] pecho.  Si tiene dolor en el pecho y tos o dificultad para respirar.  Problemas para manejar la tristeza, ansiedad o depresión.   Si le preocupa hacerse daño o hacerle daño al bebé, llame al médico de inmediato.  Tiene preguntas o inquietudes después de regresar a casa.    Warning Signs after Having a Baby    Keep this paper on your fridge or somewhere else where you can see it.    Call your provider if you have any of these symptoms up to 12 weeks after having your baby.    Thoughts of hurting yourself or your baby  Pain in your chest or trouble breathing  Severe headache not helped by pain medicine  Eyesight concerns (blurry vision, seeing spots or flashes of light, other changes to eyesight)  Fainting, shaking or other signs of a seizure    Call 9-1-1 if you feel that it is an emergency.     The symptoms below can happen to anyone after giving birth. They can be very serious. Call your provider if you have any of these warning signs.    My provider s phone number:  __[phone]_____________________    Losing too much blood (hemorrhage)    Call your provider if you soak through a pad in less than an hour or pass blood clots bigger than a golf ball. These may be signs that you are bleeding too much.    Blood clots in the legs or lungs    After you give birth, your body naturally clots its blood to help prevent blood loss. Sometimes this increased clotting can happen in other areas of the body, like the legs or lungs. This can block your blood flow and be very dangerous.     Call your provider if you:  Have a red, swollen spot on the back of your leg that is warm or painful when you touch it.   Are coughing up blood.     Infection    Call your provider if you have any of these symptoms:  Fever of 100.4 F (38 C) or higher.  Pain or redness around your stitches if you had an incision.   Any yellow, white, or green fluid coming from places where you had stitches or surgery.    Mood Problems (postpartum depression)    Many people feel sad or have mood changes after having a baby. But for some people, these mood swings are worse.     Call your provider right away if you feel so anxious or nervous that you can't care for yourself or your baby.    Preeclampsia (high blood pressure)    Even if you didn't have high blood pressure when you were pregnant, you are at risk for the high blood pressure disease called preeclampsia. This risk can last up to 12 weeks after giving birth.     Call your provider if you have:   Pain on your right side under your rib cage  Sudden swelling in the hands and face    Remember: You know your body. If something doesn't feel right, get medical help.     For informational purposes only. Not to replace the advice of your health care provider. Copyright 2020 [employer]. All rights reserved. Clinically reviewed by Rozina Murguia, RNC-OB, MSN. [employer] 061391 - Rev 02/23.

## 2024-02-14 ENCOUNTER — PRENATAL OFFICE VISIT (OUTPATIENT)
Dept: OBGYN | Facility: CLINIC | Age: 34
End: 2024-02-14
Payer: COMMERCIAL

## 2024-02-14 VITALS
HEART RATE: 84 BPM | BODY MASS INDEX: 36.82 KG/M2 | DIASTOLIC BLOOD PRESSURE: 76 MMHG | HEIGHT: 61 IN | WEIGHT: 195 LBS | SYSTOLIC BLOOD PRESSURE: 119 MMHG | TEMPERATURE: 97.6 F | RESPIRATION RATE: 18 BRPM

## 2024-02-14 DIAGNOSIS — Z30.430 ENCOUNTER FOR IUD INSERTION: Primary | ICD-10-CM

## 2024-02-14 DIAGNOSIS — Z30.430 ENCOUNTER FOR INSERTION OF MIRENA IUD: ICD-10-CM

## 2024-02-14 DIAGNOSIS — O24.419 GESTATIONAL DIABETES MELLITUS (GDM) IN THIRD TRIMESTER, GESTATIONAL DIABETES METHOD OF CONTROL UNSPECIFIED: ICD-10-CM

## 2024-02-14 DIAGNOSIS — Z30.430 ENCOUNTER FOR INSERTION OF INTRAUTERINE CONTRACEPTIVE DEVICE: ICD-10-CM

## 2024-02-14 LAB
HCG UR QL: NEGATIVE
INTERNAL QC OK POCT: NORMAL
POCT KIT EXPIRATION DATE: NORMAL
POCT KIT LOT NUMBER: NORMAL

## 2024-02-14 PROCEDURE — 58300 INSERT INTRAUTERINE DEVICE: CPT | Performed by: ADVANCED PRACTICE MIDWIFE

## 2024-02-14 PROCEDURE — 81025 URINE PREGNANCY TEST: CPT | Performed by: ADVANCED PRACTICE MIDWIFE

## 2024-02-14 PROCEDURE — 99207 PR POST PARTUM EXAM: CPT | Performed by: ADVANCED PRACTICE MIDWIFE

## 2024-02-14 ASSESSMENT — ANXIETY QUESTIONNAIRES
2. NOT BEING ABLE TO STOP OR CONTROL WORRYING: NOT AT ALL
6. BECOMING EASILY ANNOYED OR IRRITABLE: NOT AT ALL
IF YOU CHECKED OFF ANY PROBLEMS ON THIS QUESTIONNAIRE, HOW DIFFICULT HAVE THESE PROBLEMS MADE IT FOR YOU TO DO YOUR WORK, TAKE CARE OF THINGS AT HOME, OR GET ALONG WITH OTHER PEOPLE: NOT DIFFICULT AT ALL
3. WORRYING TOO MUCH ABOUT DIFFERENT THINGS: NOT AT ALL
GAD7 TOTAL SCORE: 0
5. BEING SO RESTLESS THAT IT IS HARD TO SIT STILL: NOT AT ALL
1. FEELING NERVOUS, ANXIOUS, OR ON EDGE: NOT AT ALL
4. TROUBLE RELAXING: NOT AT ALL
GAD7 TOTAL SCORE: 0
8. IF YOU CHECKED OFF ANY PROBLEMS, HOW DIFFICULT HAVE THESE MADE IT FOR YOU TO DO YOUR WORK, TAKE CARE OF THINGS AT HOME, OR GET ALONG WITH OTHER PEOPLE?: NOT DIFFICULT AT ALL
7. FEELING AFRAID AS IF SOMETHING AWFUL MIGHT HAPPEN: NOT AT ALL
7. FEELING AFRAID AS IF SOMETHING AWFUL MIGHT HAPPEN: NOT AT ALL
GAD7 TOTAL SCORE: 0

## 2024-02-14 ASSESSMENT — PATIENT HEALTH QUESTIONNAIRE - PHQ9
SUM OF ALL RESPONSES TO PHQ QUESTIONS 1-9: 0
SUM OF ALL RESPONSES TO PHQ QUESTIONS 1-9: 0
10. IF YOU CHECKED OFF ANY PROBLEMS, HOW DIFFICULT HAVE THESE PROBLEMS MADE IT FOR YOU TO DO YOUR WORK, TAKE CARE OF THINGS AT HOME, OR GET ALONG WITH OTHER PEOPLE: NOT DIFFICULT AT ALL

## 2024-02-14 NOTE — PROGRESS NOTES
SUBJECTIVE:  is here for a 6-week postpartum checkup.    Delivery date was 23. She had a repeat c/s of a viable boy with no complications.  Since delivery, she has been breast feeding.  She has No signs of infection, bleeding or other complications.    We discussed contraception and she has chosen Mirena IUD.  She  has not had intercourse since delivery and complains of No discomfort. Patient screened for postpartum depression and complaints are NEGATIVE. Screening has also been completed for intimate partner violence.    EXAM:  GENERAL healthy, alert, no distress, and cooperative  RESP: Clear to auscultation  BREASTS: NEGATIVE  CV: NEGATIVE  GYN PELVIC: NEGATIVE  MS: NEGATIVE  SKIN: no ulcers, lesions or rash  PSYCH: NEGATIVE  ASSESSMENT:      Normal postpartum exam after repeat c/s.  History of GDM   IUD placement - see below     PLAN:  Return as needed or at time interval for next routine pap, pelvic, or breast exam.  Encourage Kegals and abdominal exercise.  Continue a multi vitamin supplement, especially if breastfeeding.  Pap smear was not obtained.  Post partum Hgb was not obtained.  She declines 2 hr GTT.  She would like spot check her blood sugars and do hemoglobin A1Cs at her physicals. She understands that she is at increased risk for diabetes.      Kellie Pham, DNP, APRN, CNM        IUD Insertion:  CONSULT:    Is a pregnancy test required: Yes.  Was it positive or negative?  Negative  Was a consent obtained?  Yes    Subjective: Asmita Frye is a 34 year old  presents for IUD and desires Mirena type IUD.    Patient has been given the opportunity to ask questions about all forms of birth control, including all options appropriate for Asmita Frye. Discussed that no method of birth control, except abstinence is 100% effective against pregnancy or sexually transmitted infection.     Asmita Frye understands she may have the IUD removed at any time. IUD should be  "removed by a health care provider.    The entire insertion procedure was reviewed with the patient, including care after placement.    Patient's last menstrual period was 03/27/2023.  No allergy to betadine or shellfish. Recent STD screening  HCG Qual Urine   Date Value Ref Range Status   02/14/2024 Negative Negative Final         /76 (BP Location: Right arm, Patient Position: Chair, Cuff Size: Adult Regular)   Pulse 84   Temp 97.6  F (36.4  C) (Tympanic)   Resp 18   Ht 1.549 m (5' 1\")   Wt 88.5 kg (195 lb)   LMP 03/27/2023   Breastfeeding Yes   BMI 36.84 kg/m      Pelvic Exam:   EG/BUS: normal genital architecture without lesions, erythema or abnormal secretions.   Vagina: moist, pink, rugae with physiologic discharge and secretions  Cervix:  no lesions and pink, moist, closed, without lesion or CMT  Uterus: mobile, no pain  Adnexa: within normal limits and no masses, nodularity, tenderness    PROCEDURE NOTE: -- IUD Insertion    Reason for Insertion: contraception    Under sterile technique, cervix was visualized with speculum and prepped with Betadine solution swab x 3. Tenaculum was placed for stability. The uterus was gently straightened and sounded to 6.0 cm. IUD prepared for placement, and IUD inserted according to 's instructions without difficulty or significant resitance, and deployed at the fundus. The strings were visualized and trimmed to 2.0 cm from the external os. Tenaculum was removed and hemostasis noted. Speculum removed.  Patient tolerated procedure well.      EBL: minimal    Complications: none    ASSESSMENT:     ICD-10-CM    1. Encounter for IUD insertion  Z30.430 HCG qualitative urine POCT      2. Encounter for insertion of intrauterine contraceptive device  Z30.430 levonorgestrel (MIRENA) 52 MG (20 mcg/day) IUD 1 each     INSERTION INTRAUTERINE DEVICE      3. Encounter for insertion of mirena IUD  Z30.430       4. Normal postpartum course  Z39.2       5. Gestational " diabetes mellitus (GDM) in third trimester, gestational diabetes method of control unspecified  O24.419 blood glucose (NO BRAND SPECIFIED) test strip             PLAN:    Given 's handouts, including when to have IUD removed, list of danger s/sx, side effects and follow up recommended. Encouraged condom use for prevention of STD. Back up contraception advised for 7 days if progestin method. Advised to call for any fever, for prolonged or severe pain or bleeding, abnormal vaginal discharge, or unable to palpate strings. She was advised to use pain medications (ibuprofen) as needed for mild to moderate pain. Advised to follow-up in clinic in 4-6 weeks for IUD string check if unable to find strings or as directed by provider.     Kellie Pham CNM

## 2024-02-14 NOTE — NURSING NOTE
"Initial /76 (BP Location: Right arm, Patient Position: Chair, Cuff Size: Adult Regular)   Pulse 84   Temp 97.6  F (36.4  C) (Tympanic)   Resp 18   Ht 1.549 m (5' 1\")   Wt 88.5 kg (195 lb)   LMP 03/27/2023   Breastfeeding Yes   BMI 36.84 kg/m   Estimated body mass index is 36.84 kg/m  as calculated from the following:    Height as of this encounter: 1.549 m (5' 1\").    Weight as of this encounter: 88.5 kg (195 lb). .    "

## 2024-03-10 ENCOUNTER — HEALTH MAINTENANCE LETTER (OUTPATIENT)
Age: 34
End: 2024-03-10

## 2024-06-20 ENCOUNTER — HOSPITAL ENCOUNTER (EMERGENCY)
Facility: CLINIC | Age: 34
Discharge: HOME | End: 2024-06-20
Attending: FAMILY MEDICINE | Admitting: FAMILY MEDICINE
Payer: COMMERCIAL

## 2024-06-20 ENCOUNTER — APPOINTMENT (OUTPATIENT)
Dept: CT IMAGING | Facility: CLINIC | Age: 34
End: 2024-06-20
Attending: FAMILY MEDICINE
Payer: COMMERCIAL

## 2024-06-20 VITALS
RESPIRATION RATE: 24 BRPM | DIASTOLIC BLOOD PRESSURE: 72 MMHG | HEART RATE: 63 BPM | SYSTOLIC BLOOD PRESSURE: 124 MMHG | OXYGEN SATURATION: 95 % | TEMPERATURE: 98.5 F

## 2024-06-20 DIAGNOSIS — R11.2 NAUSEA VOMITING AND DIARRHEA: ICD-10-CM

## 2024-06-20 DIAGNOSIS — T83.32XA DISPLACEMENT OF INTRAUTERINE CONTRACEPTIVE DEVICE, INITIAL ENCOUNTER: ICD-10-CM

## 2024-06-20 DIAGNOSIS — R19.7 NAUSEA VOMITING AND DIARRHEA: ICD-10-CM

## 2024-06-20 LAB
ALBUMIN SERPL BCG-MCNC: 4.3 G/DL (ref 3.5–5.2)
ALBUMIN UR-MCNC: NEGATIVE MG/DL
ALP SERPL-CCNC: 104 U/L (ref 40–150)
ALT SERPL W P-5'-P-CCNC: 91 U/L (ref 0–50)
ANION GAP SERPL CALCULATED.3IONS-SCNC: 13 MMOL/L (ref 7–15)
APPEARANCE UR: CLEAR
AST SERPL W P-5'-P-CCNC: 55 U/L (ref 0–45)
BACTERIA #/AREA URNS HPF: ABNORMAL /HPF
BILIRUB DIRECT SERPL-MCNC: <0.2 MG/DL (ref 0–0.3)
BILIRUB SERPL-MCNC: 0.3 MG/DL
BILIRUB UR QL STRIP: NEGATIVE
BUN SERPL-MCNC: 10.3 MG/DL (ref 6–20)
CALCIUM SERPL-MCNC: 9.6 MG/DL (ref 8.6–10)
CHLORIDE SERPL-SCNC: 104 MMOL/L (ref 98–107)
COLOR UR AUTO: ABNORMAL
CREAT SERPL-MCNC: 0.57 MG/DL (ref 0.51–0.95)
DEPRECATED HCO3 PLAS-SCNC: 21 MMOL/L (ref 22–29)
EGFRCR SERPLBLD CKD-EPI 2021: >90 ML/MIN/1.73M2
ERYTHROCYTE [DISTWIDTH] IN BLOOD BY AUTOMATED COUNT: 12.6 % (ref 10–15)
GLUCOSE SERPL-MCNC: 101 MG/DL (ref 70–99)
GLUCOSE UR STRIP-MCNC: NEGATIVE MG/DL
HCG SERPL QL: NEGATIVE
HCT VFR BLD AUTO: 39.5 % (ref 35–47)
HGB BLD-MCNC: 13.2 G/DL (ref 11.7–15.7)
HGB UR QL STRIP: ABNORMAL
KETONES UR STRIP-MCNC: NEGATIVE MG/DL
LEUKOCYTE ESTERASE UR QL STRIP: NEGATIVE
LIPASE SERPL-CCNC: 34 U/L (ref 13–60)
MCH RBC QN AUTO: 29.6 PG (ref 26.5–33)
MCHC RBC AUTO-ENTMCNC: 33.4 G/DL (ref 31.5–36.5)
MCV RBC AUTO: 89 FL (ref 78–100)
MUCOUS THREADS #/AREA URNS LPF: PRESENT /LPF
NITRATE UR QL: NEGATIVE
PH UR STRIP: 6 [PH] (ref 5–7)
PLATELET # BLD AUTO: 342 10E3/UL (ref 150–450)
POTASSIUM SERPL-SCNC: 4.1 MMOL/L (ref 3.4–5.3)
PROT SERPL-MCNC: 8 G/DL (ref 6.4–8.3)
RBC # BLD AUTO: 4.46 10E6/UL (ref 3.8–5.2)
RBC URINE: 1 /HPF
SODIUM SERPL-SCNC: 138 MMOL/L (ref 135–145)
SP GR UR STRIP: 1 (ref 1–1.03)
SPERM #/AREA URNS HPF: PRESENT /HPF
SQUAMOUS EPITHELIAL: 5 /HPF
UROBILINOGEN UR STRIP-MCNC: NORMAL MG/DL
WBC # BLD AUTO: 9.1 10E3/UL (ref 4–11)
WBC URINE: 1 /HPF

## 2024-06-20 PROCEDURE — 83690 ASSAY OF LIPASE: CPT | Performed by: FAMILY MEDICINE

## 2024-06-20 PROCEDURE — 84703 CHORIONIC GONADOTROPIN ASSAY: CPT | Performed by: FAMILY MEDICINE

## 2024-06-20 PROCEDURE — 99285 EMERGENCY DEPT VISIT HI MDM: CPT | Performed by: FAMILY MEDICINE

## 2024-06-20 PROCEDURE — 96374 THER/PROPH/DIAG INJ IV PUSH: CPT | Mod: 59

## 2024-06-20 PROCEDURE — 250N000011 HC RX IP 250 OP 636: Performed by: FAMILY MEDICINE

## 2024-06-20 PROCEDURE — 36415 COLL VENOUS BLD VENIPUNCTURE: CPT | Performed by: FAMILY MEDICINE

## 2024-06-20 PROCEDURE — 80053 COMPREHEN METABOLIC PANEL: CPT | Performed by: FAMILY MEDICINE

## 2024-06-20 PROCEDURE — 96375 TX/PRO/DX INJ NEW DRUG ADDON: CPT

## 2024-06-20 PROCEDURE — 99285 EMERGENCY DEPT VISIT HI MDM: CPT | Mod: 25

## 2024-06-20 PROCEDURE — 250N000011 HC RX IP 250 OP 636: Mod: JZ | Performed by: FAMILY MEDICINE

## 2024-06-20 PROCEDURE — 74177 CT ABD & PELVIS W/CONTRAST: CPT

## 2024-06-20 PROCEDURE — 250N000009 HC RX 250: Performed by: FAMILY MEDICINE

## 2024-06-20 PROCEDURE — 81001 URINALYSIS AUTO W/SCOPE: CPT | Performed by: FAMILY MEDICINE

## 2024-06-20 PROCEDURE — 85041 AUTOMATED RBC COUNT: CPT | Performed by: FAMILY MEDICINE

## 2024-06-20 PROCEDURE — 96376 TX/PRO/DX INJ SAME DRUG ADON: CPT

## 2024-06-20 RX ORDER — ONDANSETRON 4 MG/1
4 TABLET, ORALLY DISINTEGRATING ORAL EVERY 6 HOURS PRN
Qty: 6 TABLET | Refills: 0 | Status: SHIPPED | OUTPATIENT
Start: 2024-06-20 | End: 2024-09-24

## 2024-06-20 RX ORDER — HYDROMORPHONE HYDROCHLORIDE 1 MG/ML
0.5 INJECTION, SOLUTION INTRAMUSCULAR; INTRAVENOUS; SUBCUTANEOUS EVERY 30 MIN PRN
Status: DISCONTINUED | OUTPATIENT
Start: 2024-06-20 | End: 2024-06-20 | Stop reason: HOSPADM

## 2024-06-20 RX ORDER — ONDANSETRON 2 MG/ML
4 INJECTION INTRAMUSCULAR; INTRAVENOUS EVERY 30 MIN PRN
Status: DISCONTINUED | OUTPATIENT
Start: 2024-06-20 | End: 2024-06-20 | Stop reason: HOSPADM

## 2024-06-20 RX ORDER — OXYCODONE HYDROCHLORIDE 5 MG/1
5 TABLET ORAL EVERY 6 HOURS PRN
Qty: 6 TABLET | Refills: 0 | Status: SHIPPED | OUTPATIENT
Start: 2024-06-20 | End: 2024-09-24

## 2024-06-20 RX ORDER — IOPAMIDOL 755 MG/ML
95 INJECTION, SOLUTION INTRAVASCULAR ONCE
Status: COMPLETED | OUTPATIENT
Start: 2024-06-20 | End: 2024-06-20

## 2024-06-20 RX ADMIN — SODIUM CHLORIDE 64 ML: 9 INJECTION, SOLUTION INTRAVENOUS at 15:49

## 2024-06-20 RX ADMIN — HYDROMORPHONE HYDROCHLORIDE 0.5 MG: 1 INJECTION, SOLUTION INTRAMUSCULAR; INTRAVENOUS; SUBCUTANEOUS at 14:59

## 2024-06-20 RX ADMIN — HYDROMORPHONE HYDROCHLORIDE 0.5 MG: 1 INJECTION, SOLUTION INTRAMUSCULAR; INTRAVENOUS; SUBCUTANEOUS at 16:29

## 2024-06-20 RX ADMIN — IOPAMIDOL 95 ML: 755 INJECTION, SOLUTION INTRAVENOUS at 15:49

## 2024-06-20 RX ADMIN — ONDANSETRON 4 MG: 2 INJECTION INTRAMUSCULAR; INTRAVENOUS at 14:57

## 2024-06-20 ASSESSMENT — COLUMBIA-SUICIDE SEVERITY RATING SCALE - C-SSRS
1. IN THE PAST MONTH, HAVE YOU WISHED YOU WERE DEAD OR WISHED YOU COULD GO TO SLEEP AND NOT WAKE UP?: NO
2. HAVE YOU ACTUALLY HAD ANY THOUGHTS OF KILLING YOURSELF IN THE PAST MONTH?: NO
6. HAVE YOU EVER DONE ANYTHING, STARTED TO DO ANYTHING, OR PREPARED TO DO ANYTHING TO END YOUR LIFE?: NO

## 2024-06-20 ASSESSMENT — ACTIVITIES OF DAILY LIVING (ADL)
ADLS_ACUITY_SCORE: 37

## 2024-06-20 NOTE — ED NOTES
"There were no vitals taken for this visit.    Asmita Frye is a 34-year-old female presenting with complaints of acute onset lower abdominal pain that began suddenly this morning.  Also had associated diarrhea.  Reports pain feels like \"something is twisting\".  Given patient's history, I recommended he/she be evaluated in the emergency department.  We discussed that he/she will likely require further testing and/or treatment beyond the capabilities of the current urgent care setting including labs and potential imaging.  Patient expresses understanding of this and agreement with the plan.     Viv Wade PA-C  06/20/24 1422    "

## 2024-06-20 NOTE — ED PROVIDER NOTES
History   No chief complaint on file.    HPI    Asmita Frye is a 34 year old female who comes in from home with her  with right lower quadrant abdominal pain.  Symptoms began today.  They are associated with loose watery stools.  She has had a prior appendectomy as a child and has had a .  She retains her uterus and ovaries and her gallbladder.  She has been nauseated and has vomited.  She has not noticed any fevers.  She is urinating without any difficulty.  She is not having any melena or hematochezia.  No one at home has been ill with any GI symptoms.    Allergies:  No Known Allergies    Problem List:    Patient Active Problem List    Diagnosis Date Noted    Encounter for insertion of mirena IUD 2024     Priority: Medium     24 mirena insertion lot# [id number]      S/P repeat low transverse  2023     Priority: Medium    Gestational diabetes mellitus (GDM) requiring insulin 2023     Priority: Medium    Multigravida of advanced maternal age in second trimester 2023     Priority: Medium    Class 2 obesity without serious comorbidity in adult 2023     Priority: Medium    History of  section 2023     Priority: Medium    Prenatal care, subsequent pregnancy 2023     Priority: Medium        Past Medical History:    Past Medical History:   Diagnosis Date    Chickenpox        Past Surgical History:    Past Surgical History:   Procedure Laterality Date    APPENDECTOMY       SECTION N/A 2023    Procedure:  SECTION;  Surgeon: Melodie Sesay DO;  Location: [facility] OR    GYN SURGERY         Family History:    Family History   Problem Relation Age of Onset    Heart Disease Father        Social History:  Marital Status:   [2]  Social History     Tobacco Use    Smoking status: Former     Current packs/day: 0.00     Types: Cigarettes     Quit date: 2023     Years since quittin.3    Smokeless tobacco:  Never   Vaping Use    Vaping status: Never Used   Substance Use Topics    Alcohol use: Not Currently    Drug use: Never        Medications:    ondansetron (ZOFRAN ODT) 4 MG ODT tab  oxyCODONE (ROXICODONE) 5 MG tablet  acetone urine (KETOSTIX) test strip  blood glucose (NO BRAND SPECIFIED) test strip  blood glucose (ONETOUCH ULTRA) test strip  blood glucose monitoring (NO BRAND SPECIFIED) meter device kit  ibuprofen (ADVIL/MOTRIN) 800 MG tablet  insulin pen needle (32G X 4 MM) 32G X 4 MM miscellaneous  levonorgestrel (MIRENA) 52 MG (20 mcg/day) IUD  omeprazole (PRILOSEC) 40 MG DR capsule  oxyCODONE (ROXICODONE) 5 MG tablet  Prenatal Vit-Fe Fumarate-FA (PRENATAL MULTIVITAMIN W/IRON) 27-0.8 MG tablet  senna-docusate (SENOKOT-S/PERICOLACE) 8.6-50 MG tablet  thin (NO BRAND SPECIFIED) lancets          Review of Systems  All other systems are reviewed and are negative    Physical Exam   BP: 116/66  Pulse: 66  Temp: 98.5  F (36.9  C)  Resp: 24  SpO2: 98 %      Physical Exam    Nursing note and vitals were reviewed.  Constitutional: Awake and alert, adequately nourished and developed appearing 34-year-old in moderate discomfort, who answers questions appropriately and cooperates with examination.  HEENT: Speech is fluent.  Voice quality is normal.  EOMI.   Neck: Freely mobile.  Cardiovascular: Cardiac examination reveals normal heart rate and regular rhythm without murmur.  Pulmonary/Chest: Breathing is unlabored.  Breath sounds are clear and equal bilaterally.  There no retractions, tachypnea, rales, wheezes, or rhonchi.  Abdomen: Soft, tender in the right lower quadrant maximally but tender in the left lower quadrant and right upper quadrant but no peritoneal signs, rebound, guarding, HSM, masses.  Musculoskeletal: Extremities are warm and well-perfused and without edema  Neurological: Alert, oriented, thought content logical, coherent   Skin: Warm, dry, no rashes.  Psychiatric: Affect congruent with acute symptoms.    ED  Course        Procedures              Critical Care time:  none               Results for orders placed or performed during the hospital encounter of 06/20/24 (from the past 24 hour(s))   CBC with platelets   Result Value Ref Range    WBC Count 9.1 4.0 - 11.0 10e3/uL    RBC Count 4.46 3.80 - 5.20 10e6/uL    Hemoglobin 13.2 11.7 - 15.7 g/dL    Hematocrit 39.5 35.0 - 47.0 %    MCV 89 78 - 100 fL    MCH 29.6 26.5 - 33.0 pg    MCHC 33.4 31.5 - 36.5 g/dL    RDW 12.6 10.0 - 15.0 %    Platelet Count 342 150 - 450 10e3/uL   Basic metabolic panel   Result Value Ref Range    Sodium 138 135 - 145 mmol/L    Potassium 4.1 3.4 - 5.3 mmol/L    Chloride 104 98 - 107 mmol/L    Carbon Dioxide (CO2) 21 (L) 22 - 29 mmol/L    Anion Gap 13 7 - 15 mmol/L    Urea Nitrogen 10.3 6.0 - 20.0 mg/dL    Creatinine 0.57 0.51 - 0.95 mg/dL    GFR Estimate >90 >60 mL/min/1.73m2    Calcium 9.6 8.6 - 10.0 mg/dL    Glucose 101 (H) 70 - 99 mg/dL   Lipase   Result Value Ref Range    Lipase 34 13 - 60 U/L   Hepatic function panel   Result Value Ref Range    Protein Total 8.0 6.4 - 8.3 g/dL    Albumin 4.3 3.5 - 5.2 g/dL    Bilirubin Total 0.3 <=1.2 mg/dL    Alkaline Phosphatase 104 40 - 150 U/L    AST 55 (H) 0 - 45 U/L    ALT 91 (H) 0 - 50 U/L    Bilirubin Direct <0.20 0.00 - 0.30 mg/dL   HCG qualitative Blood   Result Value Ref Range    hCG Serum Qualitative Negative Negative   Urine Macroscopic with reflex to Microscopic   Result Value Ref Range    Color Urine Straw Colorless, Straw, Light Yellow, Yellow    Appearance Urine Clear Clear    Glucose Urine Negative Negative mg/dL    Bilirubin Urine Negative Negative    Ketones Urine Negative Negative mg/dL    Specific Gravity Urine 1.003 1.003 - 1.035    Blood Urine Moderate (A) Negative    pH Urine 6.0 5.0 - 7.0    Protein Albumin Urine Negative Negative mg/dL    Urobilinogen Urine Normal Normal, 2.0 mg/dL    Nitrite Urine Negative Negative    Leukocyte Esterase Urine Negative Negative    Bacteria Urine Few  (A) None Seen /HPF    RBC Urine 1 <=2 /HPF    WBC Urine 1 <=5 /HPF    Squamous Epithelials Urine 5 (H) <=1 /HPF    Mucus Urine Present (A) None Seen /LPF    Sperm Urine Present (A) None Seen /HPF   CT Abdomen Pelvis w Contrast    Narrative    CT ABDOMEN AND PELVIS WITH CONTRAST 6/20/2024 3:57 PM    CLINICAL HISTORY: Right lower quadrant pain.    TECHNIQUE: CT scan of the abdomen and pelvis was performed following  injection of IV contrast. Multiplanar reformats were obtained. Dose  reduction techniques were used.  CONTRAST: 95 mL Isovue-370    COMPARISON: None.    FINDINGS:   LOWER CHEST: Small hiatal hernia.    HEPATOBILIARY: No hepatic masses. No calcified gallstones.    PANCREAS: Normal.    SPLEEN: Normal.    ADRENAL GLANDS: Normal.    KIDNEYS/BLADDER: Unremarkable. No hydronephrosis.    BOWEL: There is a mildly dilated loop of small bowel in the lower  abdomen right of midline (series 4 image 38; series 3 image 131),  containing fecalized contents, and demonstrating mild associated  mesenteric stranding, but no definite transition point. No evidence  for colitis or diverticulitis. Appendectomy.    LYMPH NODES: No lymphadenopathy.    VASCULATURE: Left-sided IVC.    PELVIC ORGANS: IUD is obliquely positioned within the lower uterine  segment, with one arm protruding slightly beyond the uterine wall on  the right. No free fluid in the pelvis.    ADDITIONAL FINDINGS: None.    MUSCULOSKELETAL: Unremarkable.      Impression    IMPRESSION:   1.  There is a loop of mildly dilated small bowel in the lower abdomen  right of midline, with no definite transition point. An early small  bowel obstruction is difficult to exclude on the basis of this exam.  Clinical correlation and close follow-up are recommended.  2.  IUD is obliquely positioned within the lower uterine segment, with  one arm protruding slightly beyond the uterine wall in the right.  Gynecologic consultation is recommended.    CARISSA GUPTA MD          SYSTEM ID:  [id number]       Medications   HYDROmorphone (PF) (DILAUDID) injection 0.5 mg (0.5 mg Intravenous $Given 6/20/24 1624)   ondansetron (ZOFRAN) injection 4 mg (4 mg Intravenous $Given 6/20/24 6047)   iopamidol (ISOVUE-370) solution 95 mL (95 mLs Intravenous $Given 6/20/24 1549)   sodium chloride 0.9 % bag 500mL for CT scan flush use (64 mLs As instructed $Given 6/20/24 1549)       Assessments & Plan (with Medical Decision Making)     34-year-old female presented with sudden onset of nausea, vomiting, and copious diarrhea.  Diarrhea has been nonbloody.  She had diffuse abdominal pain.  On arrival she had normal vital signs and no fever.  She had a benign abdominal examination without peritoneal signs.  Her laboratory studies were all reassuring with a normal CBC and blood chemistries with the exception of mildly elevated liver enzymes.  The latter likely due to fatty liver related to obesity.  I do not think they are related to her current symptoms.  CT scan of the abdomen pelvis shows the above findings.  I have a low suspicion that this represents a bowel obstruction and more likely represents a gastroenteritis.  There was the additional finding of the malpositioned IUD.  I discussed the situation with Dr. Lennon and OB/GYN.  We discussed possibility of perforation.  She recommended removal.  This was accomplished without any difficulty.  Patient did not have any pelvic pain and my suspicion for perforation is low but in any case does not appear to be the cause of her other symptoms.  Her symptoms improved with pain medication and antiemetics.  I think she can be monitored at home but if she has persistent pain tomorrow, recurrent vomiting, or other worrisome symptoms she should return for reevaluation.  If she has a few days of diarrhea she does not need to return but if its persistent or bloody then she should be seen for reevaluation.  She and her  expressed understanding of the recommendations  and her questions were all answered.  They said they would look for an alternative form of contraception because she did not like having the IUD in.    I have reviewed the nursing notes.    I have reviewed the findings, diagnosis, plan and need for follow up with the patient.       Discharge Medication List as of 6/20/2024  5:45 PM        START taking these medications    Details   ondansetron (ZOFRAN ODT) 4 MG ODT tab Take 1 tablet (4 mg) by mouth every 6 hours as needed for nausea, Disp-6 tablet, R-0, E-Prescribe      !! oxyCODONE (ROXICODONE) 5 MG tablet Take 1 tablet (5 mg) by mouth every 6 hours as needed, Disp-6 tablet, R-0, E-Prescribe       !! - Potential duplicate medications found. Please discuss with provider.          Final diagnoses:   Nausea vomiting and diarrhea   Displacement of intrauterine contraceptive device, initial encounter       6/20/2024   [facility] EMERGENCY DEPT       Mario Dove MD  06/20/24 7414

## 2024-06-20 NOTE — ED TRIAGE NOTES
Triage Assessment (Adult)       Row Name 06/20/24 1508          Triage Assessment    Airway WDL WDL        Respiratory WDL    Respiratory WDL WDL        Skin Circulation/Temperature WDL    Skin Circulation/Temperature WDL WDL        Cardiac WDL    Cardiac WDL WDL        Peripheral/Neurovascular WDL    Peripheral Neurovascular WDL WDL        Cognitive/Neuro/Behavioral WDL    Cognitive/Neuro/Behavioral WDL WDL

## 2024-06-20 NOTE — DISCHARGE INSTRUCTIONS
Espanol: Puede usar ondansetrón 4 mg hasta cada 6 horas si es necesario para las náuseas.  Regrese al departamento de emergencias si tiene dolor creciente o vómitos recurrentes.  Es posible que tenga diarrea kristyn los próximos días y no es necesario que regrese, si becki es el diego la diarrea se prolonga o se vuelve sanguinolenta, debe regresar para que lo atiendan.    English: You may use ondansetron 4 mg up to every 6 hours if needed for nausea.  Return to the emergency department if you have increasing pain or recurrent vomiting.  You may have diarrhea for the next few days and you do not need to return if that is the case the diarrhea is prolonged or becomes bloody you should return to be seen.

## 2024-06-20 NOTE — ED NOTES
Patient speaks some English and spouse speaks good English. Patient c/o pain and points to all across lower abdomen. Pain started this morning with one episode of vomiting and 2 hours of prn diarrhea. She has not had this pain before. She finished her menstrual cycle yesterday. Denies fevers

## 2024-06-20 NOTE — ED TRIAGE NOTES
Writer and provider spoke with pt about treatment and diagnostic options in ED vs. UC. Pt agreed to be evaluated in the ED. Chest tightness , left abdominal pian , feeling a sever twisting sensation , diarrhea this a.m.

## 2024-09-17 ENCOUNTER — VIRTUAL VISIT (OUTPATIENT)
Dept: OBGYN | Facility: CLINIC | Age: 34
End: 2024-09-17
Payer: COMMERCIAL

## 2024-09-17 DIAGNOSIS — Z34.80 PRENATAL CARE, SUBSEQUENT PREGNANCY, UNSPECIFIED TRIMESTER: Primary | ICD-10-CM

## 2024-09-17 PROBLEM — A04.8 HELICOBACTER PYLORI INFECTION: Status: ACTIVE | Noted: 2018-11-26

## 2024-09-17 PROBLEM — E66.812 OBESITY, CLASS II, BMI 35-39.9: Status: ACTIVE | Noted: 2017-05-01

## 2024-09-17 PROCEDURE — 99207 PR NO CHARGE NURSE ONLY: CPT | Mod: 93

## 2024-09-17 NOTE — PATIENT INSTRUCTIONS
Learning About Pregnancy  Your Care Instructions     Your health in the early weeks of your pregnancy is particularly important for your baby's health. Take good care of yourself. Anything you do that harms your body can also harm your baby.  Make sure to go to all of your doctor appointments. Regular checkups will help keep you and your baby healthy.  How can you care for yourself at home?  Diet    Choose healthy foods like fruits, vegetables, whole grains, lean proteins, and healthy fats.     Choose foods that are good sources of calcium, iron, and folate. You can try dairy products, dark leafy greens, fortified orange juice and cereals, almonds, broccoli, dried fruit, and beans.     Do not skip meals or go for many hours without eating. If you are nauseated, try to eat a small, healthy snack every 2 to 3 hours.     Avoid fish that are high in mercury. These include shark, swordfish, adelaida mackerel, marlin, orange roughy, and bigeye tuna, as well as tilefish from the [facility].     It's okay to eat up to 8 to 12 ounces a week of fish that are low in mercury or up to 4 ounces a week of fish that have medium levels of mercury. Some fish that are low in mercury are salmon, shrimp, canned light tuna, cod, and tilapia. Some fish that have medium levels of mercury are halibut and white albacore tuna.     Drink plenty of fluids. If you have kidney, heart, or liver disease and have to limit fluids, talk with your doctor before you increase the amount of fluids you drink.     Limit caffeine to about 200 to 300 mg per day. On average, a cup of brewed coffee has around 80 to 100 mg of caffeine.     Do not drink alcohol, such as beer, wine, or hard liquor.     Take a multivitamin that contains at least 400 micrograms (mcg) of folic acid to help prevent birth defects. Fortified cereal and whole wheat bread are good additional sources of folic acid.     Increase the calcium in your diet. Try to drink a quart of skim milk  each day. You may also take calcium supplements and choose foods such as cheese and yogurt.   Lifestyle    Make sure you go to your follow-up appointments.     Get plenty of rest. You may be unusually tired while you are pregnant.     Get at least 30 minutes of exercise on most days of the week. Walking is a good choice. If you have not exercised in the past, start out slowly. Take several short walks each day.     Do not smoke. If you need help quitting, talk to your doctor about stop-smoking programs. These can increase your chances of quitting for good.     Do not touch cat feces or litter boxes. Also, wash your hands after you handle raw meat, and fully cook all meat before you eat it. Wear gloves when you work in the yard or garden, and wash your hands well when you are done. Cat feces, raw or undercooked meat, and contaminated dirt can cause an infection that may harm your baby or lead to a miscarriage.     Avoid things that can make your body too hot and may be harmful to your baby, such as a hot tub or sauna. Or talk with your doctor before doing anything that raises your body temperature. Your doctor can tell you if it's safe.     Avoid chemical fumes, paint fumes, or poisons.     Do not use illegal drugs, marijuana, or alcohol.   Medicines    Review all of your medicines with your doctor. Some of your routine medicines may need to be changed to protect your baby.     Use acetaminophen (Tylenol) to relieve minor problems, such as a mild headache or backache or a mild fever with cold symptoms. Do not use nonsteroidal anti-inflammatory drugs (NSAIDs), such as ibuprofen (Advil, Motrin) or naproxen (Aleve), unless your doctor says it is okay.     Do not take two or more pain medicines at the same time unless the doctor told you to. Many pain medicines have acetaminophen, which is Tylenol. Too much acetaminophen (Tylenol) can be harmful.     Take your medicines exactly as prescribed. Call your doctor if you  "think you are having a problem with your medicine.   To manage morning sickness    Keep food in your stomach, but not too much at once. Try eating five or six small meals a day instead of three large meals.     For nausea when you wake up, eat a small snack, such as a couple of crackers or pretzels, before rising. Allow a few minutes for your stomach to settle before you slowly get up.     Try to avoid smells and foods that make you feel nauseated. High-fat or greasy foods, milk, and coffee may make nausea worse. Some foods that may be easier to tolerate include cold, spicy, sour, and salty foods.     Drink enough fluids. Water and other caffeine-free drinks are good choices.     Take your prenatal vitamins at night on a full stomach.     Try foods and drinks made with damaris. Damaris may help with nausea.     Get lots of rest. Morning sickness may be worse when you are tired.     Talk to your doctor about over-the-counter products, such as vitamin B6 or doxylamine, to help relieve symptoms.     Try a P6 acupressure wrist band. These anti-nausea wristbands help some people.   Follow-up care is a key part of your treatment and safety. Be sure to make and go to all appointments, and call your doctor if you are having problems. It's also a good idea to know your test results and keep a list of the medicines you take.  Where can you learn more?  Go to https://www.[email or web address] [email or web address]/patiented  Enter E868 in the search box to learn more about \"Learning About Pregnancy.\"  Current as of: July 10, 2023               Content Version: 14.0    5756-2390 [employer].   Care instructions adapted under license by your healthcare professional. If you have questions about a medical condition or this instruction, always ask your healthcare professional. [employer] disclaims any warranty or liability for your use of this information.      Weeks 6 to 10 of Your Pregnancy: Care Instructions  During these weeks of " "pregnancy, your body goes through many changes. You may start to feel different, both in your body and your emotions. Each pregnancy is different, so there's no \"right\" way to feel. These early weeks are a time to make healthy choices for you and your pregnancy.    Take a daily prenatal vitamin. Choose one with folic acid in it.    Avoid alcohol, tobacco, and drugs (including marijuana). If you need help quitting, talk to your doctor.    Drink plenty of liquids.  Be sure to drink enough water. And limit sodas, other sweetened drinks, and caffeine.     Choose foods that are good sources of calcium, iron, and folate.  You can try dairy products, dark leafy greens, fortified orange juice and cereals, almonds, broccoli, dried fruit, and beans.     Avoid foods that may be harmful.  Don't eat raw meat, deli meat, raw seafood, or raw eggs. Avoid soft cheese and unpasteurized dairy, like Brie and blue cheese. And don't eat fish that contains a lot of mercury, like shark and swordfish.     Don't touch jessica litter or cat poop.  They can cause an infection that could be harmful during pregnancy.     Avoid things that can make your body too hot.  For example, avoid hot tubs and saunas.     Soothe morning sickness.  Try eating 5 or 6 small meals a day, getting some fresh air, or using jacinta to control symptoms.     Ask your doctor about flu and COVID-19 shots.  Getting them can help protect against infection.   Follow-up care is a key part of your treatment and safety. Be sure to make and go to all appointments, and call your doctor if you are having problems. It's also a good idea to know your test results and keep a list of the medicines you take.  Where can you learn more?  Go to [email or web address]://[email or web address] [email or web address]/patiented  Enter G112 in the search box to learn more about \"Weeks 6 to 10 of Your Pregnancy: Care Instructions.\"  Current as of: July 10, 2023               Content Version: 14.0    8591-9471 [employer]. "   Care instructions adapted under license by your healthcare professional. If you have questions about a medical condition or this instruction, always ask your healthcare professional. [employer] disclaims any warranty or liability for your use of this information.         Pregnancy: Managing Morning Sickness (01:48)  Your health professional recommends that you watch this short online health video.  Learn how to manage morning sickness during pregnancy.   Purpose:  Goal: Learn how to manage morning sickness during pregnancy.    Watch: Scan the QR code or visit the link to view video       [email or web address]  Current as of: July 10, 2023  Content Version: 14.1 2006-2024 [employer].   Care instructions adapted under license by your healthcare professional. If you have questions about a medical condition or this instruction, always ask your healthcare professional. [employer] disclaims any warranty or liability for your use of this information.    Pregnancy and Heartburn: Care Instructions  Overview     Heartburn is a common problem during pregnancy.  Heartburn happens when stomach acid backs up into the tube that carries food to the stomach. This tube is called the esophagus. Early in pregnancy, heartburn is caused by hormone changes that slow down digestion. Later on, it's also caused by the large uterus pushing up on the stomach.  Even though you can't fix the cause, there are things you can do to get relief. Treating heartburn during pregnancy focuses first on making lifestyle changes, like changing what and how you eat, and on taking medicines.  Heartburn usually improves or goes away after childbirth.  Follow-up care is a key part of your treatment and safety. Be sure to make and go to all appointments, and call your doctor if you are having problems. It's also a good idea to know your test results and keep a list of the medicines you take.  How can you  "care for yourself at home?  Eat small, frequent meals.  Avoid foods that make your symptoms worse, such as chocolate, peppermint, and spicy foods. Avoid drinks with caffeine, such as coffee, tea, and sodas.  Avoid bending over or lying down after meals.  Take a short walk after you eat.  If heartburn is a problem at night, do not eat for 2 hours before bedtime.  Take antacids like Mylanta, Maalox, Rolaids, or Tums. Do not take antacids that have sodium bicarbonate, magnesium trisilicate, or aspirin. Be careful when you take over-the-counter antacid medicines. Many of these medicines have aspirin in them. While you are pregnant, do not take aspirin or medicines that contain aspirin unless your doctor says it is okay.  If you're not getting relief, talk to your doctor. You may be able to take a stronger acid-reducing medicine.  When should you call for help?   Call your doctor now or seek immediate medical care if:    You have new or worse belly pain.     You are vomiting.   Watch closely for changes in your health, and be sure to contact your doctor if:    You have new or worse symptoms of reflux.     You are losing weight.     You have trouble or pain swallowing.     You do not get better as expected.   Where can you learn more?  Go to https://www.[email or web address].net/patiented  Enter U946 in the search box to learn more about \"Pregnancy and Heartburn: Care Instructions.\"  Current as of: July 10, 2023  Content Version: 14.1 2006-2024 [employer].   Care instructions adapted under license by your healthcare professional. If you have questions about a medical condition or this instruction, always ask your healthcare professional. [employer] disclaims any warranty or liability for your use of this information.    Constipation: Care Instructions  Overview     Constipation means that you have a hard time passing stools (bowel movements). People pass stools from 3 times a day to once every 3 days. " What is normal for you may be different. Constipation may occur with pain in the rectum and cramping. The pain may get worse when you try to pass stools. Sometimes there are small amounts of bright red blood on toilet paper or the surface of stools. This is because of enlarged veins near the rectum (hemorrhoids).  A few changes in your diet and lifestyle may help you avoid ongoing constipation. Your doctor may also prescribe medicine to help loosen your stool.  Some medicines can cause constipation. These include pain medicines and antidepressants. Tell your doctor about all the medicines you take. Your doctor may want to make a medicine change to ease your symptoms.  Follow-up care is a key part of your treatment and safety. Be sure to make and go to all appointments, and call your doctor if you are having problems. It's also a good idea to know your test results and keep a list of the medicines you take.  How can you care for yourself at home?  Drink plenty of fluids. If you have kidney, heart, or liver disease and have to limit fluids, talk with your doctor before you increase the amount of fluids you drink.  Include high-fiber foods in your diet each day. These include fruits, vegetables, beans, and whole grains.  Get at least 30 minutes of exercise on most days of the week. Walking is a good choice. You also may want to do other activities, such as running, swimming, cycling, or playing tennis or team sports.  Take a fiber supplement, such as Citrucel or Metamucil, every day. Read and follow all instructions on the label.  Schedule time each day for a bowel movement. A daily routine may help. Take your time having a bowel movement, but don't sit for more than 10 minutes at a time. And don't strain too much.  Support your feet with a small step stool when you sit on the toilet. This helps flex your hips and places your pelvis in a squatting position.  Your doctor may recommend an over-the-counter laxative to  "relieve your constipation. Examples are Milk of Magnesia and MiraLax. Read and follow all instructions on the label. Do not use laxatives on a long-term basis.  When should you call for help?   Call your doctor now or seek immediate medical care if:    You have new or worse belly pain.     You have new or worse nausea or vomiting.     You have blood in your stools.   Watch closely for changes in your health, and be sure to contact your doctor if:    Your constipation is getting worse.     You do not get better as expected.   Where can you learn more?  Go to https://www.Mimecast.net/patiented  Enter P343 in the search box to learn more about \"Constipation: Care Instructions.\"  Current as of: October 19, 2023               Content Version: 14.0    5941-6088 [employer].   Care instructions adapted under license by your healthcare professional. If you have questions about a medical condition or this instruction, always ask your healthcare professional. [email or web address] disclaims any warranty or liability for your use of this information.      Learning About High-Iron Foods  What foods are high in iron?     The foods you eat contain nutrients, such as vitamins and minerals. Iron is a nutrient. Your body needs the right amount to stay healthy and work as it should. You can use the list below to help you make choices about which foods to eat.  Here are some foods that contain iron. They have 1 to 2 milligrams of iron per serving.  Fruits  Figs (dried), 5 figs  Vegetables  Asparagus (canned), 6 ford  Martha, beet, Swiss chard, or turnip greens, 1 cup  Dried peas, cooked,   cup  Seaweed, spirulina (dried),   cup  Spinach, (cooked)   cup or (raw) 1 cup  Grains  Cereals, fortified with iron, 1 cup  Grits (instant, cooked), fortified with iron,   cup  Meats and other protein foods  Beans (kidney, lima, navy, white), canned or cooked,   cup  Beef or lamb, 3 oz  Chicken giblets, 3 oz  Chickpeas " "(garbanzo beans),   cup  Liver of beef, lamb, or pork, 3 oz  Oysters (cooked), 3 oz  Sardines (canned), 3 oz  Soybeans (boiled),   cup  Tofu (firm),   cup  Work with your doctor to find out how much of this nutrient you need. Depending on your health, you may need more or less of it in your diet.  Where can you learn more?  Go to https://www.Kyruus.net/patiented  Enter R005 in the search box to learn more about \"Learning About High-Iron Foods.\"  Current as of: September 20, 2023  Content Version: 14.1    1629-6502 [employer].   Care instructions adapted under license by your healthcare professional. If you have questions about a medical condition or this instruction, always ask your healthcare professional. [employer] disclaims any warranty or liability for your use of this information.    Rh Antibodies Screening During Pregnancy: About This Test  What is it?     The Rh antibodies screening test is a blood test. It checks your blood for Rh antibodies. If you have Rh-negative blood and have been exposed to Rh-positive blood, your immune system may make antibodies to attack the Rh-positive blood. When a pregnant woman has these antibodies, it is called Rh sensitization.  Why is this test done?  The Rh antibodies screening test is done during pregnancy to find out if your baby is at risk for Rh disease. This can happen if you have Rh-negative blood and your baby has Rh-positive blood. If your Rh-negative blood mixes with Rh-positive blood, your immune system will make antibodies to attack the Rh-positive blood.  During pregnancy, these antibodies could attach to the baby's red blood cells. This can cause your baby to have serious health problems. The results of this test will help your doctor know how to best care for you and your baby during your pregnancy.  How do you prepare for the test?  In general, there's nothing you have to do before this test, unless your doctor tells you " "to.  How is the test done?  A health professional uses a needle to take a blood sample, usually from the arm.  What happens after the test?  You will probably be able to go home right away. It depends on the reason for the test.  You can go back to your usual activities right away.  Follow-up care is a key part of your treatment and safety. Be sure to make and go to all appointments, and call your doctor if you are having problems. It's also a good idea to keep a list of the medicines you take. Ask your doctor when you can expect to have your test results.  Where can you learn more?  Go to https://www.EQUISO.net/patiented  Enter P722 in the search box to learn more about \"Rh Antibodies Screening During Pregnancy: About This Test.\"  Current as of: July 10, 2023  Content Version: 14.1    5156-9337 [employer].   Care instructions adapted under license by your healthcare professional. If you have questions about a medical condition or this instruction, always ask your healthcare professional. [employer] disclaims any warranty or liability for your use of this information.    Learning About Preventing Rh Disease  What is Rh disease?     Rh disease can be a serious problem in pregnancy. It happens when substances called antibodies in the mother's blood cause red blood cells in her baby's blood to be destroyed. This can occur when the blood types of a mother and her baby do not match.  All blood has an Rh factor. This is what makes a blood type positive or negative. When you are Rh-negative, your baby may be Rh-negative or Rh-positive. If your baby has Rh-positive blood and it mixes with yours, your body will make antibodies. This is called Rh sensitization.  Most of the time, this is not a problem in a first pregnancy. But in future pregnancies, it could cause Rh disease.  A  with Rh disease has mild anemia and may have jaundice. In severe cases, anemia, jaundice, and swelling can be " "very dangerous or fatal. Some babies need to be delivered early. Some need special care in the NICU. A very sick baby will need a blood transfusion before or after birth.  Fortunately, Rh sensitization is usually easy to prevent.  That's why it's important to get your Rh status checked in your first trimester. It doesn't cause any warning signs. A blood test is the only way to know if you are Rh-sensitive or are at risk for it.  How can you prevent Rh disease?  If you are Rh-negative, your doctor gives you an Rh immune globulin shot (such as RhoGAM). It helps prevent your body from making the antibodies that attack your baby's red blood cells.  Timing is important. You need the shot at certain times during your pregnancy. And you need one anytime there is a chance that your baby's blood might mix with yours. That can happen with certain prenatal tests or when you have pregnancy bleeding, such as:  Right after any pregnancy loss, amniocentesis, or CVS testing.  After turning of a breech baby.  Before and maybe after childbirth. Your doctor gives you a shot around week 28. If your  is Rh-positive, you will have another shot.  Follow-up care is a key part of your treatment and safety. Be sure to make and go to all appointments, and call your doctor if you are having problems. It's also a good idea to know your test results and keep a list of the medicines you take.  Where can you learn more?  Go to https://www.Predictivez.net/patiented  Enter W177 in the search box to learn more about \"Learning About Preventing Rh Disease.\"  Current as of: July 10, 2023  Content Version: 14. [employer].   Care instructions adapted under license by your healthcare professional. If you have questions about a medical condition or this instruction, always ask your healthcare professional. [employer] disclaims any warranty or liability for your use of this information.    Learning About Rh " Immunoglobulin Shots  Introduction     An Rh immunoglobulin shot is given to pregnant women who have Rh-negative blood.  You may have Rh-negative blood, and your baby may have Rh-positive blood. If the two types of blood mix, your body will make antibodies. This is called Rh sensitization. Most of the time, this is not a problem the first time you're pregnant. But it could cause problems in future pregnancies.  This shot keeps your body from making the antibodies. You get the shot around 28 weeks of pregnancy. After the birth, your baby's blood is tested. If the blood is Rh positive, you will get another shot. You may also get the shot if you have vaginal bleeding while you are pregnant or if you have a miscarriage. These shots protect future pregnancies.  Women with Rh negative blood will need this shot each time they get pregnant.  Example  Rh immunoglobulin (HypRho-D, MICRhoGAM, and RhoGAM)  Possible side effects  Rare side effects may include:  Some mild pain where you got the shot.  A slight fever.  An allergic reaction.  You may have other side effects not listed here. Check the information that comes with your medicine.  What to know about taking this medicine  You may need more than one shot. You may need the shot again:  After amniocentesis, fetal blood sampling, or chorionic villus sampling tests.  If you have bleeding in your second or third trimester.  After turning of a breech baby.  After an injury to the belly while you are pregnant.  After a miscarriage or an .  Before or right after treatment for an ectopic or a partial molar pregnancy.  Tell your doctor if you have any allergies or have had a bad response to medicines in the past.  If you get this shot within 3 months of getting a live-virus vaccine, the vaccine may not work. Your doctor will tell you if you need more vaccine.  Check with your doctor or pharmacist before you use any other medicines. This includes over-the-counter medicines.  "Make sure your doctor knows all of the medicines, vitamins, herbs, and supplements you take. Taking some medicines at the same time can cause problems.  Where can you learn more?  Go to https://www.Estify.net/patiented  Enter V615 in the search box to learn more about \"Learning About Rh Immunoglobulin Shots.\"  Current as of: July 10, 2023               Content Version: 14.0    7715-8197 [employer].   Care instructions adapted under license by your healthcare professional. If you have questions about a medical condition or this instruction, always ask your healthcare professional. [employer] disclaims any warranty or liability for your use of this information.      Rubella (British Measles): Care Instructions  Overview  Rubella, also called British measles or 3-day measles, is a disease caused by a virus. It spreads by coughs, sneezes, and close contact. Rubella usually is mild and does not cause long-term problems. But if you are pregnant and get it, you can give the disease to your unborn baby. This can cause serious birth defects.  While you have rubella, you may get a rash and a mild fever, and the lymph glands in your neck may swell. Older children often have a fever, eye pain, a sore throat, and body aches. You can relieve most symptoms with care at home. Avoid being around others, especially pregnant people, until your rash has been gone for at least 4 days. People who have not had this disease before or have not had the vaccine have the greatest chance of getting the virus.  Follow-up care is a key part of your treatment and safety. Be sure to make and go to all appointments, and call your doctor if you are having problems. It's also a good idea to know your test results and keep a list of the medicines you take.  How can you care for yourself at home?  Drink plenty of fluids. If you have kidney, heart, or liver disease and have to limit fluids, talk with your doctor before you " "increase the amount of fluids you drink.  Get plenty of rest to help your body heal.  Take an over-the-counter pain medicine, such as acetaminophen (Tylenol), ibuprofen (Advil, Motrin), or naproxen (Aleve), to reduce fever and discomfort. Read and follow all instructions on the label. Do not give aspirin to anyone younger than 20. It has been linked to Reye syndrome, a serious illness.  Do not take two or more pain medicines at the same time unless the doctor told you to. Many pain medicines have acetaminophen, which is Tylenol. Too much acetaminophen (Tylenol) can be harmful.  Try not to scratch the rash. Put cold, wet cloths on the rash to reduce itching.  Do not smoke. Smoking can make your symptoms worse. If you need help quitting, talk to your doctor about stop-smoking programs and medicines. These can increase your chances of quitting for good.  Avoid contact with people who have never had rubella and who have not been immunized.  When should you call for help?   Call your doctor now or seek immediate medical care if:    You have a fever with a stiff neck or a severe headache.     You are sensitive to light or feel very sleepy or confused.   Watch closely for changes in your health, and be sure to contact your doctor if:    You do not get better as expected.   Where can you learn more?  Go to https://www."[employer].net/patiented  Enter B812 in the search box to learn more about \"Rubella (Persian Measles): Care Instructions.\"  Current as of: June 12, 2023               Content Version: 14.0    8460-5303 [employer].   Care instructions adapted under license by your healthcare professional. If you have questions about a medical condition or this instruction, always ask your healthcare professional. [employer] disclaims any warranty or liability for your use of this information.      Gonorrhea and Chlamydia: About These Tests  What is it?  These tests use a sample of urine or other body " fluid to look for the bacteria that cause these sexually transmitted infections (STIs). The fluid sample can come from the cervix, vagina, rectum, throat, or eyes.  Why is this test done?  These tests may be done to:  Find out if symptoms are caused by gonorrhea or chlamydia.  Check people who are at high risk of being infected with gonorrhea or chlamydia.  Retest people several months after they have been treated for gonorrhea or chlamydia.  Check for infection in your  if you had a gonorrhea or chlamydia infection at the time of delivery.  How can you prepare for the test?  If you are going to have a urine test, do not urinate for at least 1 hour before the test.  If you think you may have chlamydia or gonorrhea, don't have sexual intercourse until you get your test results. And you may want to have tests for other STIs, such as HIV.  How is the test done?  For a direct sample, a swab is used to collect body fluid from the cervix, vagina, rectum, throat, or eyes. Your doctor may collect the sample. Or you may be given instructions on how to collect your own sample.  For a urine sample, you will collect the urine that comes out when you first start to urinate. Don't wipe the genital area clean before you urinate.  How long does the test take?  The test will take a few minutes.  What happens after the test?  You will be able to go home right away.  You can go back to your usual activities right away.  If you do have an infection, don't have sexual intercourse for 7 days after you start treatment. And your sex partner(s) should also be treated.  Follow-up care is a key part of your treatment and safety. Be sure to make and go to all appointments, and call your doctor if you are having problems. It's also a good idea to keep a list of the medicines you take. Ask your doctor when you can expect to have your test results.  Where can you learn more?  Go to https://www.healthwise.net/patiented  Enter K976 in the  "search box to learn more about \"Gonorrhea and Chlamydia: About These Tests.\"  Current as of: November 27, 2023  Content Version: 14.1 2006-2024 [employer].   Care instructions adapted under license by your healthcare professional. If you have questions about a medical condition or this instruction, always ask your healthcare professional. [employer] disclaims any warranty or liability for your use of this information.    Trichomoniasis: About This Test  What is it?     This test uses a sample of urine or other body fluid to look for the tiny parasite that causes trichomoniasis (also called trich). The fluid sample can come from the vagina, cervix, or urethra. Your doctor may choose to use one or more of many available tests.  Why is it done?  A trich test may be done to:  Find out if symptoms are caused by trich.  Check people who are at high risk for being infected with trich.  Check after treatment to make sure that the infection is gone.  How do you prepare for the test?  If you are going to have a urine test, do not urinate for at least 1 hour before the test.  How is the test done?  For a direct sample, a swab is used to collect body fluid from the cervix, vagina, or urethra. Your doctor may collect the sample. Or you may be given instructions on how to collect your own sample.  For a urine sample, you will collect the urine that comes out when you first start to urinate. Don't wipe the area clean before you urinate.  How long does the test take?  It will take a few minutes to collect a sample.  What happens after the test?  You can go home right away.  You can go back to your usual activities right away.  You may get the test results the same day or several days later. It depends on the test used.  If you do have an infection, don't have sexual intercourse for 7 days after you start treatment. Your sex partner or partners should also be treated.  Follow-up care is a key part of " your treatment and safety. Be sure to make and go to all appointments, and call your doctor if you are having problems. Ask your doctor when you can expect to have your test results.  Current as of: November 27, 2023  Content Version: 14.1    8751-9108 [employer].   Care instructions adapted under license by your healthcare professional. If you have questions about a medical condition or this instruction, always ask your healthcare professional. [employer] disclaims any warranty or liability for your use of this information.    HIV Testing: Care Instructions  Overview  You can get tested for the human immunodeficiency virus (HIV). Most doctors use a blood test to check for HIV antibodies and antigens in your blood. It may also check for the genetic material (RNA) of HIV. Some tests use saliva to check for HIV antibodies. But these aren't as accurate. For example, they may give a false result if you've just been infected.  What do the results mean?    Normal (negative)    No HIV antibodies, antigens, or RNA were found.  You may need more testing. It can make sure your test results are correct.    Uncertain (indeterminate)    Test results didn't clearly show if you have an HIV infection.  HIV antibodies or antigens may not have formed yet.  Some other type of antibody or antigen may have affected the results.  You will need another test to be sure.    Abnormal (positive)    HIV antibodies, antigens, or RNA were found.  If you haven't had an RNA test yet, one will be done. If it's positive, you have HIV.  If your test result is positive, your doctor will talk to you. You will discuss starting treatment.  Follow-up care is a key part of your treatment and safety. Be sure to make and go to all appointments, and call your doctor if you are having problems. It's also a good idea to know your test results and keep a list of the medicines you take.  Where can you learn more?  Go to  "https://www.[employer].net/patiented  Enter T792 in the search box to learn more about \"HIV Testing: Care Instructions.\"  Current as of: June 12, 2023               Content Version: 14.0    9453-3578 [employer].   Care instructions adapted under license by your healthcare professional. If you have questions about a medical condition or this instruction, always ask your healthcare professional. [employer] disclaims any warranty or liability for your use of this information.      Hepatitis C Virus Tests: About These Tests  What are they?     Hepatitis C virus tests are blood tests that check for substances in the blood that show whether you have hepatitis C now or had it in the past. The tests can also tell you what type of hepatitis C you have and how severe the disease is. This can help your doctor with treatment.  If the tests show that you have long-term hepatitis C, you need to take steps to prevent spreading the disease.  Why are these tests done?  You may need these tests if:  You have symptoms of hepatitis.  You may have been exposed to the virus. You are more likely to have been exposed to the virus if you inject drugs or are exposed to body fluids (such as if you are a health care worker).  You've had other tests that show you have liver problems.  You are 18 to 79 years old.  You have an HIV infection.  The tests also are done to help your doctor decide about your treatment and see how well it works.  How do you prepare for the test?  In general, there's nothing you have to do before this test, unless your doctor tells you to.  How is the test done?  A health professional uses a needle to take a blood sample, usually from the arm.  What happens after these tests?  You will probably be able to go home right away.  You can go back to your usual activities right away.  Follow-up care is a key part of your treatment and safety. Be sure to make and go to all appointments, and call " "your doctor if you are having problems. It's also a good idea to keep a list of the medicines you take. Ask your doctor when you can expect to have your test results.  Where can you learn more?  Go to https://www.[employer].net/patiented  Enter W551 in the search box to learn more about \"Hepatitis C Virus Tests: About These Tests.\"  Current as of: June 12, 2023               Content Version: 14.0    [id number] [employer].   Care instructions adapted under license by your healthcare professional. If you have questions about a medical condition or this instruction, always ask your healthcare professional. [employer] disclaims any warranty or liability for your use of this information.      Learning About Fetal Ultrasound Results  What is a fetal ultrasound?     Fetal ultrasound is a test that lets your doctor see an image of your baby. Your doctor learns information about your baby from this picture. You may find out, for example, if you are having a boy or a girl. But the main reason you have this test is to get information about your baby's health.  (You may hear your baby called a fetus. This is a common medical term for a baby that's growing in the mother's uterus.)  What kind of information can you learn from this test?  The findings of an ultrasound fall into two categories, normal and abnormal.  Normal  The fetus is the right size for its age.  The placenta is the expected size and does not cover the cervix.  There is enough amniotic fluid in the uterus.  No birth defects can be seen.  Abnormal  The fetus is small or large for its age.  The placenta covers the cervix.  There is too much or too little amniotic fluid in the uterus.  The fetus may have a birth defect.  What does an abnormal result mean?  Abnormal seems to imply that something is wrong with your baby. But what it means is that the test has shown something the doctor wants to take a closer look at.  And that's what happens " next. Your doctor will talk to you about what further test or tests you may need.  What do the results mean?  Some of the things your doctor may see on an abnormal ultrasound include:  Echogenic bowel.  The bowel looks very bright on the screen. This could mean that there's blood in the bowel. Or it could mean that something is blocking the small bowel.  Increased nuchal translucency.  The ultrasound measures the thickness at the back of the baby's neck. An increase in thickness is sometimes an early sign of Down syndrome.  Increased or decreased amniotic fluid.  The doctor will look for a reason for the level of amniotic fluid and will watch the pregnancy closely as it progresses.  Large ventricles.  Ventricles in the brain look larger than they should. Your doctor may take a closer look at the brain.  Renal pyelectasis/hydronephrosis.  The ultrasound measures the fluid around the kidney. If there is more fluid than expected, there is a chance of urinary tract or kidney problems.  Short long bones.  The ultrasound measures certain arm and leg bones. A long bone (humerus or femur) that is shorter than average could be a sign of Down syndrome.  Subchorionic hemorrhage.  An ultrasound can show bleeding under one of the membranes that surrounds the fetus. Some women don't have symptoms of bleeding. The ultrasound can find this problem when women are not bleeding from their vagina. Women who have this condition have a slightly higher chance of miscarriage.  What do you do now?  Take a deep breath, and let it out. Keep in mind that an abnormal finding on an ultrasound, after it's coupled with more information, may:  Turn out to be nothing.  Turn out to be something mild that won't affect the baby.  Turn out to be something more serious. But if this happens, early diagnosis helps you and your doctor plan treatment options sooner rather than later.  Your medical team is there for you. So are your family and friends. Ask  "questions, and get the help and support you need.  Follow-up care is a key part of your treatment and safety. Be sure to make and go to all appointments, and call your doctor if you are having problems. It's also a good idea to know your test results and keep a list of the medicines you take.  Where can you learn more?  Go to https://www.[employer].net/patiented  Enter K451 in the search box to learn more about \"Learning About Fetal Ultrasound Results.\"  Current as of: July 10, 2023  Content Version: 14.1    7797-8301 [employer].   Care instructions adapted under license by your healthcare professional. If you have questions about a medical condition or this instruction, always ask your healthcare professional. [employer] disclaims any warranty or liability for your use of this information.    Learning About Prenatal Visits  Overview     Regular prenatal visits are very important during any pregnancy. These quick office visits may seem simple and routine. But they can help you have a safe and healthy pregnancy. Your doctor is watching for problems that can only be found through regular checkups. The visits also give you and your doctor time to build a good relationship.  After your first visit, you will most likely start on a schedule of monthly visits. In your third trimester, the visits will get more frequent. Based on your health, your age, and if you've had a normal, full-term pregnancy before, your doctor may want to see you more or less often.  At different times in your pregnancy, you will have exams and tests. Some are routine. Others are done only when there is a chance of a problem. Everything healthy you do for your body helps you have a healthy pregnancy. Rest when you need it. Eat well, drink plenty of water, and exercise regularly.  What happens during a prenatal visit?  You will have blood pressure checks, along with urine tests. You also may have blood tests. If you need " to go to the bathroom while waiting for the doctor, tell the nurse. You will be given a sample cup so your urine can be tested.  You will be weighed and have your belly measured.  Your doctor may listen to the fetal heartbeat with a special device.  At about 24 weeks, and possibly earlier in your pregnancy, your doctor will check your blood sugar (glucose tolerance test) for diabetes that can occur during pregnancy. This is gestational diabetes, which can be harmful.  You will have tests to check for infections that could harm your . These include group B streptococcus and hepatitis B.  Your doctor may do ultrasounds to check for problems. This also checks the position of the fetus. An ultrasound uses sound waves to produce a picture of the fetus.  You may get your vaccines updated.  Your doctor may ask you questions to check for signs of anxiety or depression. Tell your doctor if you feel sad, anxious, or hopeless for more than a few days.  You may have other tests at any time during your pregnancy.  Use your visits to discuss with your doctor any concerns you have.  How can you care for yourself at home?  Get plenty of rest.  Try to exercise every day, if your doctor says it is okay. If you have not exercised in the past, start out slowly. For example, you can take short walks each day.  Choose healthy foods, such as fruits, vegetables, whole grains, lean proteins, low-fat dairy, and healthy fats.  Drink plenty of fluids. Cut down on drinks with caffeine, such as coffee, tea, and cola. If you have kidney, heart, or liver disease and have to limit fluids, talk with your doctor before you increase the amount of fluids you drink.  Try to avoid chemical fumes, paint fumes, and poisons.  If you smoke, vape, or use alcohol, marijuana, or other drugs, quit or cut back as much as you can. Talk to your doctor if you need help quitting.  Review all of your medicines, including over-the-counter medicines and  "supplements, with your doctor. Some of your routine medicines may need to be changed. Do not stop or start taking any medicines without talking to your doctor first.  Follow-up care is a key part of your treatment and safety. Be sure to make and go to all appointments, and call your doctor if you are having problems. It's also a good idea to know your test results and keep a list of the medicines you take.  Where can you learn more?  Go to https://www.[employer].net/patiented  Enter J502 in the search box to learn more about \"Learning About Prenatal Visits.\"  Current as of: July 10, 2023               Content Version: 14.0    0893-1305 [employer].   Care instructions adapted under license by your healthcare professional. If you have questions about a medical condition or this instruction, always ask your healthcare professional. [employer] disclaims any warranty or liability for your use of this information.      Intimate Partner Violence: Care Instructions  Overview     If you want to save this information but don't think it is safe to take it home, see if a trusted friend can keep it for you. Plan ahead. Know who you can call for help, and memorize the phone number.   Be careful online too. Your online activity may be seen by others. Do not use your personal computer or device to read about this topic. Use a safe computer, such as one at work, a friend's home, or a library.    Intimate partner violence--a type of domestic abuse--is different from an argument now and then. It is a pattern of abuse that one person may use to control another person's behavior. It may start with threats and name-calling. Then, it may lead to more serious acts, like pushing and slapping. The abuse also may occur in other areas. For example, the abuser may withhold money or spend a partner's money without their knowledge.  Abuse can cause serious harm. You are more likely to have a long-term health problem " from the injuries and stress of living in a violent relationship. People who are sexually abused by their partners have more sexually transmitted infections and unplanned pregnancies. Anyone who is abused also faces emotional pain. Anyone can be abused in relationships. In some relationships, both people use abusive behavior.  If you are pregnant, abuse can cause problems such as poor weight gain, infections, and bleeding. Abuse during this time may increase your baby's risk of low birth weight, premature birth, and death.  Follow-up care is a key part of your treatment and safety. Be sure to make and go to all appointments, and call your doctor if you are having problems. It's also a good idea to know your test results and keep a list of the medicines you take.  How can you care for yourself at home?  If you do not have a safe place to stay, discuss this with your doctor before you leave.  Have a plan for where to go, how to leave your home, and where to stay in case of an emergency. Do not tell your partner about your plan. Contact:  The National Domestic Violence Hotline toll-free at 1-257.264.4257. They can help you find resources in your area.  Your local police department, hospital, or clinic for information about shelters and safe homes near you.  Talk to a trusted friend or neighbor, a counselor, or a mercedes leader. Do not feel that you have to hide what happened.  Teach your children how to call for help in an emergency.  Be alert to warning signs, such as threats, heavy alcohol use, or drug use. This can help you avoid danger.  If you can, make sure that there are no guns or other weapons in your home.  When should you call for help?   Call 911 anytime you think you may need emergency care. For example, call if:    You or someone else has just been abused.     You think you or someone else is in danger of being abused.   Watch closely for changes in your health, and be sure to contact your doctor if you  "have any problems.  Where can you learn more?  Go to https://www.[employer].net/patiented  Enter G282 in the search box to learn more about \"Intimate Partner Violence: Care Instructions.\"  Current as of: June 24, 2023               Content Version: 14.0    1228-6467 [employer].   Care instructions adapted under license by your healthcare professional. If you have questions about a medical condition or this instruction, always ask your healthcare professional. [employer] disclaims any warranty or liability for your use of this information.      Intimate Partner Violence Safety Instructions: Care Instructions  Overview     If you want to save this information but don't think it is safe to take it home, see if a trusted friend can keep it for you. Plan ahead. Know who you can call for help, and memorize the phone number.   Be careful online too. Your online activity may be seen by others. Do not use your personal computer or device to read about this topic. Use a safe computer, such as one at work, a friend's home, or a library.    When you are abused by a spouse or partner, you can take actions to protect yourself and your children.  You can increase your safety whether you decide to stay with your spouse or partner or you decide to leave. You may want to make a safety plan and pack a bag ahead of time. This will help you leave quickly when you decide to. Remember, you cannot change your partner's actions, but you can find help for you and your children. No one deserves to be abused.  Follow-up care is a key part of your treatment and safety. Be sure to make and go to all appointments, and call your doctor if you are having problems. It's also a good idea to know your test results and keep a list of the medicines you take.  How can you care for yourself at home?  Make a plan for your safety   If you decide to stay with your abusive spouse or partner, you can do the following to increase " your safety:  Decide what works best to keep you safe in an emergency.  Know who you can call to help you in an emergency.  Decide if you will call the police if you get hurt again. If you can, agree on a signal with your children or neighbor to call the police for you if you need help. You can flash lights or hang something out of a window.  Choose a safe place to go for a short time if you need to leave home. Memorize the address and phone number.  Learn escape routes out of your home in case you need to leave in a hurry. Teach your children different ways to get out of your home quickly if they need to.  If you can, hide or lock up things that can be used as weapons (guns, knives, hammers).  Learn the number of a domestic violence shelter. Talk to the people there about how they can help.  Find out about other community resources that can help you.  Take pictures of bruises or other injuries if you can. You can also take pictures of things your abuser has broken.  Teach your children that violence is never okay. Tell them that they do not deserve to be hurt.  Pack a bag   Prepare a bag with things you will need if you leave suddenly. Leave it with a friend, a relative, or someone else you trust. You could include the following items in the bag:  A set of keys to your home and car.  Emergency phone numbers and addresses.  Money such as cash or checks. You can also ask a friend, a relative, or someone else you trust to hold money for you.  Copies of legal documents such as house and car titles or rent receipts, birth certificates, Social Security card, voter registration, marriage and 's licenses, and your children's health records.  Personal items you would need for a few days, such as clothes, a toothbrush, toothpaste, and any medicines you or your children need.  A favorite toy or book for your child or children.  Diapers and bottles, if you have very young children.  Pictures that show signs of abuse and  "violence. You may also add pictures of your abuser.  If you leave   If you decide to leave, you can take the following steps:  Go to the emergency room at a hospital if you have been hurt.  Think about asking the police to be with you as you leave. They can protect you as you leave your home.  If you decide to leave secretly, remember that activities can be tracked. Your abuser may still have access to your cell phone, email, and credit cards. It may be possible for these to be traced. Always be aware of your surroundings.  If this is an emergency, do not worry about gathering up anything. Just leave--your safety is most important.  If your abuser moves out, change the locks on the doors. If you have a security system, change the access code.  When should you call for help?   Call 911 anytime you think you may need emergency care. For example, call if:    You or someone else has just been abused.     You think you or someone else is in danger of being abused.   Watch closely for changes in your health, and be sure to contact your doctor if you have any problems.  Where can you learn more?  Go to https://www.Net[employer].me.net/patiented  Enter A752 in the search box to learn more about \"Intimate Partner Violence Safety Instructions: Care Instructions.\"  Current as of: June 24, 2023               Content Version: 14.0    3570-8966 [employer].   Care instructions adapted under license by your healthcare professional. If you have questions about a medical condition or this instruction, always ask your healthcare professional. [employer] disclaims any warranty or liability for your use of this information.      Learning About Intimate Partner Violence  What is intimate partner violence?  Intimate partner violence is a type of domestic abuse. It's threatening, emotionally harmful, or violent behavior in a personal relationship. It can happen between past or current partners or spouses. In some " relationships both people abuse each other. One partner may be more abusive. Or the abuse may be equal.  Abuse can affect people of any ethnic group, race, or Scientologist. It can affect teens, adults, or the elderly. And it can happen to people of any sexual orientation, gender, or social status.  Abusers use fear, bullying, and threats to control their partners. They may control what their partners do. They may control where their partners go or who they see. They may act jealous, controlling, or possessive. These early signs of abuse may happen soon after the start of the relationship. Sometimes it can be hard to notice abuse at first. But after the relationship becomes more serious, the abuse may get worse.  If you are being abused in your relationship, it's important to get help. The abuse is not your fault. You don't have to face it alone.  Be careful  It may not be safe to take home domestic abuse information like this handout. Some people ask a trusted friend to keep it for them. It's also important to plan ahead and to memorize the phone number of places you can go for help. If you are concerned about your safety, do not use your computer, smartphone, or tablet to read about domestic abuse.   What are the types of intimate partner violence?  Abuse can happen in different ways. Each type can happen on its own or in combination with others.  Emotional abuse  Emotional abuse is a pattern of threats, insults, or controlling behavior. It includes verbal abuse. It goes beyond healthy disagreements in a relationship. It's a sign of an unhealthy relationship.  Do you feel threatened, intimidated, or controlled?  Does your partner:  Threaten your children, other family members, or pets?  Use jokes meant to embarrass or shame you?  Call you names?  Tell you that you are a bad parent?  Threaten to take away your children?  Threaten to have you or your family members deported?  Control your access to money or other basic  needs?  Control what you do, who you see or talk to, or where you go?  Another form of emotional abuse is denying that it is happening. Or the abuser may act like the abuse is no big deal or is your fault.  Sexual abuse  With sexual abuse, abusers may try to convince or force you to have sex. They may force you into sex acts you're not comfortable with. Or they may sexually assault you. Sexual abuse can happen even if you are in a committed relationship.  Physical abuse  Physical abuse means that a partner hits, kicks, or does something else to physically hurt you. Physical abuse that starts with a slap might lead to kicking, shoving, and choking over time. The abuser may also threaten to hurt or kill you.  Stalking  Stalking means that an abuser gives you attention that you do not want and that causes you fear. Examples of stalking include:  Following you.  Showing up at places where the abuser isn't invited, such as at your work or school.  Constantly calling or texting you.  What problems can  to?  Intimate partner violence can be very dangerous. It can cause serious, repeated injury. It can even lead to death.  All forms of abuse can cause long-term health problems from the stress of a violent relationship. Verbal abuse can lead to sexual and physical abuse.  Abuse causes:  Emotional pain.  Depression.  Anxiety.  Post-traumatic stress.  Sexual abuse can lead to sexually transmitted infections (such as HIV/AIDS) and unplanned pregnancy.  Pregnancy can be a very dangerous time for people in abusive relationships. Abuse can cause or increase the risk of problems during pregnancy. These include low weight gain, anemia, infections, and bleeding. Abuse may also increase your baby's risk of low birth weight, premature birth, and death.  It can be hard for some victims of abuse to ask for help or to leave their relationship. You may feel scared, stuck, or not sure what steps to take. But it's important not to  "ignore abuse. Talking to someone you trust could be the first step to ending the abuse and taking care of your own health and happiness again. There are resources available that can help keep you safe.  Where can you get help?  Talk to a trusted friend. Find a local advocacy group, or talk to your doctor about the abuse.  Contact the National Domestic Violence Hotline at 0-307-839-SAFE (1-566.493.4509) for more safety tips. They can guide you to groups in your area that can help. Or go to the National Coalition Against Domestic Violence website at www.thehotlTabSprint.org to learn more.  Domestic violence groups or a counselor in your area can help you make a safety plan for yourself and your children.  When to call for help  Call 911 anytime you think you may need emergency care. For example, call if:  You think that you or someone you know is in danger of being abused.  You have been hurt and can't have someone safely take you to emergency care.  You have just been abused.  A family member has just been abused.  Where can you learn more?  Go to https://www.[employer].net/patiented  Enter S665 in the search box to learn more about \"Learning About Intimate Partner Violence.\"  Current as of: June 24, 2023  Content Version: 14.1 2006-2024 [employer].   Care instructions adapted under license by your healthcare professional. If you have questions about a medical condition or this instruction, always ask your healthcare professional. [employer] disclaims any warranty or liability for your use of this information.    Vaginal Bleeding During Pregnancy: Care Instructions  Overview     It's common to have some vaginal spotting when you are pregnant. In some cases, the bleeding isn't serious. And there aren't any more problems with the pregnancy.  But sometimes bleeding is a sign of a more serious problem. This is more common if the bleeding is heavy or painful. Examples of more serious problems " include miscarriage, an ectopic pregnancy, and a problem with the placenta.  You may have to see your doctor again to be sure everything is okay. You may also need more tests to find the cause of the bleeding.  Home treatment may be all you need. But it depends on what is causing the bleeding. Be sure to tell your doctor if you have any new symptoms or if your symptoms get worse.  The doctor has checked you carefully, but problems can develop later. If you notice any problems or new symptoms, get medical treatment right away.  Follow-up care is a key part of your treatment and safety. Be sure to make and go to all appointments, and call your doctor if you are having problems. It's also a good idea to know your test results and keep a list of the medicines you take.  How can you care for yourself at home?  If your doctor prescribed medicines, take them exactly as directed. Call your doctor if you think you are having a problem with your medicine.  Do not have vaginal sex until your doctor says it's okay.  Do not put anything in your vagina until your doctor says it's okay.  Ask your doctor about other activities you can or can't do.  Get a lot of rest. Being pregnant can make you tired.  Do not use nonsteroidal anti-inflammatory drugs (NSAIDs), such as ibuprofen (Advil, Motrin), naproxen (Aleve), or aspirin, unless your doctor says it is okay.  When should you call for help?   Call 911 anytime you think you may need emergency care. For example, call if:    You passed out (lost consciousness).     You have severe vaginal bleeding. This means you are soaking through a pad each hour for 2 or more hours.     You have sudden, severe pain in your belly or pelvis.   Call your doctor now or seek immediate medical care if:    You have new or worse vaginal bleeding.     You are dizzy or lightheaded, or you feel like you may faint.     You have pain in your belly, pelvis, or lower back.     You think that you are in labor.      "You have a sudden release of fluid from your vagina.     You've been having regular contractions for an hour. This means that you've had at least 8 contractions within 1 hour or at least 4 contractions within 20 minutes, even after you change your position and drink fluids.     You notice that your baby has stopped moving or is moving much less than normal.   Watch closely for changes in your health, and be sure to contact your doctor if you have any problems.  Where can you learn more?  Go to https://www.[employer]/patiented  Enter N829 in the search box to learn more about \"Vaginal Bleeding During Pregnancy: Care Instructions.\"  Current as of: July 10, 2023               Content Version: 14.0    8957-8953 [employer].   Care instructions adapted under license by your healthcare professional. If you have questions about a medical condition or this instruction, always ask your healthcare professional. [employer] disclaims any warranty or liability for your use of this information.      Weeks 10 to 14 of Your Pregnancy: Care Instructions  It's now possible to hear the fetus's heartbeat with a special ultrasound device. And the fetus's organs are developing.    Decide about tests to check for birth defects. Think about your age, your chance of passing on a family disease, your need to know about any problems, and what you might do after you have the test results.    It's okay to exercise. Try activities such as walking or swimming. Check with your doctor before starting a new program.    You may feel more tired than usual.  Taking naps during the day may help.     You may feel emotional.  It might help to talk to someone.     You may have headaches.  Try lying down and putting a cool cloth over your forehead.     You can use acetaminophen (Tylenol) for pain relief.  Don't take any anti-inflammatory medicines (such as Advil, Motrin, Aleve), unless your doctor says it's okay.     You may " "feel a fullness or aching in your lower belly.  This can feel like the kind of cramps you might get before a period. A back rub may help.     You may need to urinate more.  Your growing uterus and changing hormones can affect your bladder.     You may feel sick to your stomach (morning sickness).  Try avoiding food and smells that make you feel sick.     Your breasts may feel different.  They may feel tender or get bigger. Your nipples may get darker. Try a bra that gives you good support.     Avoid alcohol, tobacco, and drugs (including marijuana).  If you need help quitting, talk to your doctor.     Take a daily prenatal vitamin.  Choose one with folic acid.   Follow-up care is a key part of your treatment and safety. Be sure to make and go to all appointments, and call your doctor if you are having problems. It's also a good idea to know your test results and keep a list of the medicines you take.  Where can you learn more?  Go to https://[email or web address].[place].net/patiented  Enter E090 in the search box to learn more about \"Weeks 10 to 14 of Your Pregnancy: Care Instructions.\"  Current as of: July 10, 2023               Content Version: 14.0    8388-4467 [employer].   Care instructions adapted under license by your healthcare professional. If you have questions about a medical condition or this instruction, always ask your healthcare professional. [employer] disclaims any warranty or liability for your use of this information.        Nutrition During Pregnancy: Care Instructions  Overview     Healthy eating when you are pregnant is important for you and your baby. It can help you feel well and have a successful pregnancy and delivery. During pregnancy your nutrition needs increase. Even if you have excellent eating habits, your doctor may recommend a multivitamin to make sure you get enough iron and folic acid.  You may wonder how much weight you should gain. In general, if you were at a " healthy weight before you became pregnant, then you should gain between 25 and 35 pounds. If you were overweight before pregnancy, then you'll likely be advised to gain 15 to 25 pounds. If you were underweight before pregnancy, then you'll probably be advised to gain 28 to 40 pounds. Your doctor will work with you to set a weight goal that is right for you. Gaining a healthy amount of weight helps you have a healthy baby.  Follow-up care is a key part of your treatment and safety. Be sure to make and go to all appointments, and call your doctor if you are having problems. It's also a good idea to know your test results and keep a list of the medicines you take.  How can you care for yourself at home?  Eat plenty of fruits and vegetables. Include a variety of orange, yellow, and leafy dark-green vegetables every day.  Choose whole-grain bread, cereal, and pasta. Good choices include whole wheat bread, whole wheat pasta, brown rice, and oatmeal.  Get 4 or more servings of milk and milk products each day. Good choices include nonfat or low-fat milk, yogurt, and cheese. If you cannot eat milk products, you can get calcium from calcium-fortified products such as orange juice, soy milk, and tofu. Other non-milk sources of calcium include leafy green vegetables, such as broccoli, kale, mustard greens, turnip greens, bok bing, and brussels sprouts.  If you eat meat, pick lower-fat types. Good choices include lean cuts of meat and chicken or turkey without the skin.  Avoid fish that are high in mercury. These include shark, swordfish, adelaida mackerel, marlin, orange roughy, and bigeye tuna, as well as tilefish from the [place].  It's okay to eat up to 8 to 12 ounces a week of fish that are low in mercury or up to 4 ounces a week of fish that have medium levels of mercury. Some fish that are low in mercury are salmon, shrimp, canned light tuna, cod, and tilapia. Some fish that have medium levels of mercury are halibut  "and white albacore tuna.  For more advice about eating fish, you can visit the U.S. Food and Drug Administration (FDA) or U.S. Environmental Protection Agency (EPA) website.  Heat lunch meats (such as turkey, ham, or bologna) to 165 F before you eat them. This reduces your risk of getting sick from a kind of bacteria that can be found in lunch meats.  Do not eat unpasteurized soft cheeses, such as brie, feta, fresh mozzarella, and blue cheese. They have a bacteria that could harm your baby.  Limit caffeine to about 200 to 300 mg per day. On average, a cup of brewed coffee has around 80 to 100 mg of caffeine.  Do not drink any alcohol. No amount of alcohol has been found to be safe during pregnancy.  Do not diet or try to lose weight. For example, do not follow a low-carbohydrate diet. If you are overweight at the start of your pregnancy, your doctor will work with you to manage your weight gain.  Tell your doctor about all vitamins and supplements you take.  When should you call for help?  Watch closely for changes in your health, and be sure to contact your doctor if you have any problems.  Where can you learn more?  Go to https://www.[employer].net/patiented  Enter Y785 in the search box to learn more about \"Nutrition During Pregnancy: Care Instructions.\"  Current as of: September 20, 2023               Content Version: 14.0    3574-7480 [employer].   Care instructions adapted under license by your healthcare professional. If you have questions about a medical condition or this instruction, always ask your healthcare professional. [employer] disclaims any warranty or liability for your use of this information.      Exercise During Pregnancy: Care Instructions  Overview     Exercise is good for you during a healthy pregnancy. It can relieve back pain, swelling, and other discomforts. It also prepares your muscles for childbirth. And exercise can improve your energy level and help you " sleep better.  If your doctor advises it, get more exercise. For example, walking is a good choice. Bit by bit, increase the amount you walk every day. Try for at least 30 minutes on most days of the week. You could also try a prenatal exercise class. But if you do not already exercise, be sure to talk with your doctor before you start a new exercise program. Doctors do not recommend contact sports during pregnancy.  Follow-up care is a key part of your treatment and safety. Be sure to make and go to all appointments, and call your doctor if you are having problems. It's also a good idea to know your test results and keep a list of the medicines you take.  How can you care for yourself at home?  Talk with your doctor about the right kind of exercise for each stage of pregnancy.  Listen to your body to know if your exercise is at a safe level.  Do not become overheated while you exercise. High body temperature can be harmful. Avoid activities that can make your body too hot.  If you feel tired, take it easy. You might walk instead of run.  If you are used to strenuous exercise, ask your doctor how to know when it's time to slow down.  If you exercised before getting pregnant, you should be able to keep up your routine early in your pregnancy. Later in your pregnancy, you may want to switch to more gentle activities.  Drink plenty of fluids before, during, and after exercise.  Avoid contact sports, such as soccer and basketball. Also avoid risky activities. These include scuba diving, horseback riding, and exercising at a high altitude (above 6,000 feet). If you live in a place with a high altitude, talk to your doctor about how you can exercise safely.  Do not get overtired while you exercise. You should be able to talk while you work out.  After your fourth month of pregnancy, avoid exercises that require you to lie flat on your back on a hard surface. These include sit-ups and some yoga poses.  Get plenty of rest.  "You may be very tired while you are pregnant.  Where can you learn more?  Go to https://www.[employer].net/patiented  Enter S801 in the search box to learn more about \"Exercise During Pregnancy: Care Instructions.\"  Current as of: July 10, 2023               Content Version: 14.0    4515-7429 [employer].   Care instructions adapted under license by your healthcare professional. If you have questions about a medical condition or this instruction, always ask your healthcare professional. [employer] disclaims any warranty or liability for your use of this information.      Learning About Pregnancy and Obesity  How does your weight affect your pregnancy?     The basics of prenatal care are the same for everyone, regardless of size. You'll get what you need to have a healthy baby.  But your size can make a difference in a few things. You and your doctor will have to watch your pregnancy weight. Your weight may affect your labor and delivery.  You may have some extra doctor visits and tests. And you may have some tests earlier in your pregnancy. You'll need to pay close attention to things like blood pressure and the chance of getting gestational diabetes. (This is a type of diabetes that sometimes happens during pregnancy.) And close attention will be given to your developing baby.  Work with your doctor to get the care you need. Go to all your doctor visits, and follow your doctor's advice about what to do and what to avoid during pregnancy.  How much weight gain is healthy?  If you are very overweight (obese), experts recommend that you gain between 11 and 20 pounds. Your doctor will work with you to set a weight goal that's right for you. In some cases, your doctor may recommend that you not gain any weight.  How much extra food do you need to eat?  Although you may joke that you're \"eating for two\" during pregnancy, you don't need to eat twice as much food. How much you can eat depends " "on:  Your height.  How much you weigh when you get pregnant.  How active you are.  If you're carrying more than one fetus (multiple pregnancy).  In the first trimester, you'll probably need the same amount of calories as you did before you were pregnant. In general, in your second trimester, you need to eat about 340 extra calories a day. In your third trimester, you need to eat about 450 extra calories a day.  What can you do to have a healthy pregnancy?  The best things you can do for you and your baby are to eat healthy foods, get regular exercise, avoid alcohol and smoking, and go to your doctor visits.  Eat a variety of foods from all the food groups. Make sure to get enough calcium and folic acid.  You may want to work with a dietitian to help you plan healthy meals to get the right amount of calories for you.  If you didn't exercise much before you got pregnant, talk to your doctor about how you can slowly get more active. Your doctor may want to set up an exercise program with you.  Where can you learn more?  Go to https://www.[employer].net/patiented  Enter B644 in the search box to learn more about \"Learning About Pregnancy and Obesity.\"  Current as of: July 10, 2023  Content Version: 14.1 2006-2024 [employer].   Care instructions adapted under license by your healthcare professional. If you have questions about a medical condition or this instruction, always ask your healthcare professional. [employer] disclaims any warranty or liability for your use of this information.    You have been provided the CDC Warning Signs in Pregnancy document.    Additional copies can be found here: www.ParentPlus.com/733415.pdf  "

## 2024-09-17 NOTE — PROGRESS NOTES
Important Information for Provider: patient had her IUD removed 6/20/24 due to malposition. She is unsure of the day in July of lmp. Home pregnancy test line was faint, per patient.    Telephone visit with patient for New Prenatal Intake and Education. This is patient's 4th pregnancy. Handouts reviewed and will be provided at next prenatal appointment. Scheduled for New Prenatal with Dr Sesay on 9/24/24.           Prenatal OB Questionnaire  Patient supplied answers from flow sheet for:  Prenatal OB Questionnaire.  Past Medical History  Have you ever recieved care for your mental health? : No  Have you ever been in a major accident or suffered serious trauma?: No  Within the last year, has anyone hit, slapped, kicked or otherwise hurt you?: No  In the last year, has anyone forced you to have sex when you didn't want to?: No    Past Medical History 2   Have you ever received a blood transfusion?: No  Would you accept a blood transfusion if was medically recommended?: Yes  Does anyone in your home smoke?: (!) Yes ( smokes outside)   Is your blood type Rh negative?: No  Have you ever ?: (!) Yes  Have you been hospitalized for a nonsurgical reason excluding normal delivery?: No  Have you ever had an abnormal pap smear?: No    Past Medical History (Continued)  Do you have a history of abnormalities of the uterus?: No  Did your mother take SYD or any other hormones when she was pregnant with you?: Unknown  Do you have any other problems we have not asked about which you feel may be important to this pregnancy?: No                     Allergies as of 9/17/2024:    Allergies as of 09/17/2024    (No Known Allergies)       Current medications are:  Current Outpatient Medications   Medication Sig Dispense Refill    Prenatal Vit-Fe Fumarate-FA (PRENATAL MULTIVITAMIN W/IRON) 27-0.8 MG tablet Take 1 tablet by mouth daily      acetone urine (KETOSTIX) test strip Use 1 strip daily to check urine ketones. (Patient  not taking: Reported on 9/17/2024) 50 strip 1    blood glucose (NO BRAND SPECIFIED) test strip Use to test blood sugar 4 times daily or as directed. To accompany: Blood Glucose Monitor Brands: per insurance. (Patient not taking: Reported on 9/17/2024) 50 strip 2    blood glucose (ONETOUCH ULTRA) test strip Use to test blood sugar 4 times daily or as directed. (Patient not taking: Reported on 2/14/2024) 200 strip 4    blood glucose monitoring (NO BRAND SPECIFIED) meter device kit Use to test blood sugar 4 times daily or as directed. Preferred blood glucose meter OR supplies to accompany: Blood Glucose Monitor Brands: per insurance. (Patient not taking: Reported on 2/14/2024) 1 kit 0    ibuprofen (ADVIL/MOTRIN) 800 MG tablet Take 1 tablet (800 mg) by mouth every 6 hours (Patient not taking: Reported on 2/14/2024) 30 tablet 3    insulin pen needle (32G X 4 MM) 32G X 4 MM miscellaneous Use 1 pen needles daily or as directed. (Patient not taking: Reported on 2/14/2024) 100 each 1    levonorgestrel (MIRENA) 52 MG (20 mcg/day) IUD 1 each by Intrauterine route once (Patient not taking: Reported on 9/17/2024)      omeprazole (PRILOSEC) 40 MG DR capsule Take 1 capsule (40 mg) by mouth daily (Patient not taking: Reported on 2/14/2024) 90 capsule 3    ondansetron (ZOFRAN ODT) 4 MG ODT tab Take 1 tablet (4 mg) by mouth every 6 hours as needed for nausea (Patient not taking: Reported on 9/17/2024) 6 tablet 0    oxyCODONE (ROXICODONE) 5 MG tablet Take 1 tablet (5 mg) by mouth every 6 hours as needed (Patient not taking: Reported on 9/17/2024) 6 tablet 0    oxyCODONE (ROXICODONE) 5 MG tablet Take 1 tablet (5 mg) by mouth every 4 hours as needed for moderate to severe pain (Patient not taking: Reported on 2/14/2024) 15 tablet 0    senna-docusate (SENOKOT-S/PERICOLACE) 8.6-50 MG tablet Take 1 tablet by mouth 2 times daily as needed for constipation (Patient not taking: Reported on 2/14/2024) 30 tablet 1    thin (NO BRAND SPECIFIED)  lancets Use with lancing device to check glucose 4 times/day. To accompany: Blood Glucose Monitor Brands: per insurance. (Patient not taking: Reported on 2/14/2024) 200 each 1         Early ultrasound screening tool:    Does patient have irregular periods?  Yes, IUD removed 01735.  Did patient use hormonal birth control in the three months prior to positive urine pregnancy test? Yes  Is the patient breastfeeding?  No  Is the patient 10 weeks or greater at time of education visit?  No    PHQ-2 Score:         9/17/2024    11:05 AM 2/14/2024     2:44 PM   PHQ-2 ( 1999 [employer])   Q1: Little interest or pleasure in doing things 0 0   Q2: Feeling down, depressed or hopeless 0 0   PHQ-2 Score 0 0   Q1: Little interest or pleasure in doing things  Not at all   Q2: Feeling down, depressed or hopeless  Not at all   PHQ-2 Score  0     Yenifer Pearl LPN

## 2024-09-23 LAB
ABO/RH(D): NORMAL
ANTIBODY SCREEN: NEGATIVE
SPECIMEN EXPIRATION DATE: NORMAL

## 2024-09-24 ENCOUNTER — PRENATAL OFFICE VISIT (OUTPATIENT)
Dept: OBGYN | Facility: CLINIC | Age: 34
End: 2024-09-24
Payer: COMMERCIAL

## 2024-09-24 VITALS
BODY MASS INDEX: 37.84 KG/M2 | HEART RATE: 78 BPM | DIASTOLIC BLOOD PRESSURE: 68 MMHG | HEIGHT: 61 IN | RESPIRATION RATE: 18 BRPM | TEMPERATURE: 98.4 F | WEIGHT: 200.4 LBS | SYSTOLIC BLOOD PRESSURE: 108 MMHG

## 2024-09-24 DIAGNOSIS — O09.522 MULTIGRAVIDA OF ADVANCED MATERNAL AGE IN SECOND TRIMESTER: ICD-10-CM

## 2024-09-24 DIAGNOSIS — Z86.32 HISTORY OF GESTATIONAL DIABETES: ICD-10-CM

## 2024-09-24 DIAGNOSIS — Z11.3 SCREENING FOR STD (SEXUALLY TRANSMITTED DISEASE): ICD-10-CM

## 2024-09-24 DIAGNOSIS — K21.00 GASTROESOPHAGEAL REFLUX DISEASE WITH ESOPHAGITIS WITHOUT HEMORRHAGE: ICD-10-CM

## 2024-09-24 DIAGNOSIS — Z34.81 PRENATAL CARE, SUBSEQUENT PREGNANCY IN FIRST TRIMESTER: Primary | ICD-10-CM

## 2024-09-24 DIAGNOSIS — E66.812 OBESITY, CLASS II, BMI 35-39.9: ICD-10-CM

## 2024-09-24 DIAGNOSIS — R11.0 NAUSEA: ICD-10-CM

## 2024-09-24 DIAGNOSIS — Z98.891 HISTORY OF CESAREAN SECTION: ICD-10-CM

## 2024-09-24 LAB
ALBUMIN UR-MCNC: NEGATIVE MG/DL
APPEARANCE UR: CLEAR
BACTERIA #/AREA URNS HPF: ABNORMAL /HPF
BILIRUB UR QL STRIP: NEGATIVE
C TRACH DNA SPEC QL PROBE+SIG AMP: NEGATIVE
COLOR UR AUTO: YELLOW
ERYTHROCYTE [DISTWIDTH] IN BLOOD BY AUTOMATED COUNT: 14.1 % (ref 10–15)
EST. AVERAGE GLUCOSE BLD GHB EST-MCNC: 103 MG/DL
GLUCOSE UR STRIP-MCNC: NEGATIVE MG/DL
HBA1C MFR BLD: 5.2 % (ref 0–5.6)
HBV CORE AB SERPL QL IA: NONREACTIVE
HBV SURFACE AB SERPL IA-ACNC: 47.2 M[IU]/ML
HBV SURFACE AB SERPL IA-ACNC: REACTIVE M[IU]/ML
HBV SURFACE AG SERPL QL IA: NONREACTIVE
HCT VFR BLD AUTO: 35.4 % (ref 35–47)
HCV AB SERPL QL IA: NONREACTIVE
HGB BLD-MCNC: 11.5 G/DL (ref 11.7–15.7)
HGB UR QL STRIP: NEGATIVE
HOLD SPECIMEN: NORMAL
KETONES UR STRIP-MCNC: NEGATIVE MG/DL
LEUKOCYTE ESTERASE UR QL STRIP: NEGATIVE
MCH RBC QN AUTO: 29.5 PG (ref 26.5–33)
MCHC RBC AUTO-ENTMCNC: 32.5 G/DL (ref 31.5–36.5)
MCV RBC AUTO: 91 FL (ref 78–100)
MUCOUS THREADS #/AREA URNS LPF: PRESENT /LPF
N GONORRHOEA DNA SPEC QL NAA+PROBE: NEGATIVE
NITRATE UR QL: NEGATIVE
PH UR STRIP: 6 [PH] (ref 5–7)
PLATELET # BLD AUTO: 292 10E3/UL (ref 150–450)
RBC # BLD AUTO: 3.9 10E6/UL (ref 3.8–5.2)
RBC #/AREA URNS AUTO: ABNORMAL /HPF
RUBV IGG SERPL QL IA: 3 INDEX
RUBV IGG SERPL QL IA: POSITIVE
SP GR UR STRIP: 1.02 (ref 1–1.03)
SQUAMOUS #/AREA URNS AUTO: ABNORMAL /LPF
T PALLIDUM AB SER QL: NONREACTIVE
UROBILINOGEN UR STRIP-ACNC: 0.2 E.U./DL
WBC # BLD AUTO: 7.9 10E3/UL (ref 4–11)
WBC #/AREA URNS AUTO: ABNORMAL /HPF

## 2024-09-24 PROCEDURE — 85027 COMPLETE CBC AUTOMATED: CPT | Performed by: OBSTETRICS & GYNECOLOGY

## 2024-09-24 PROCEDURE — 87086 URINE CULTURE/COLONY COUNT: CPT | Performed by: OBSTETRICS & GYNECOLOGY

## 2024-09-24 PROCEDURE — 87591 N.GONORRHOEAE DNA AMP PROB: CPT | Performed by: OBSTETRICS & GYNECOLOGY

## 2024-09-24 PROCEDURE — 86706 HEP B SURFACE ANTIBODY: CPT | Performed by: OBSTETRICS & GYNECOLOGY

## 2024-09-24 PROCEDURE — 36415 COLL VENOUS BLD VENIPUNCTURE: CPT | Performed by: OBSTETRICS & GYNECOLOGY

## 2024-09-24 PROCEDURE — 87491 CHLMYD TRACH DNA AMP PROBE: CPT | Performed by: OBSTETRICS & GYNECOLOGY

## 2024-09-24 PROCEDURE — 86762 RUBELLA ANTIBODY: CPT | Performed by: OBSTETRICS & GYNECOLOGY

## 2024-09-24 PROCEDURE — 99213 OFFICE O/P EST LOW 20 MIN: CPT | Mod: 25 | Performed by: OBSTETRICS & GYNECOLOGY

## 2024-09-24 PROCEDURE — 81001 URINALYSIS AUTO W/SCOPE: CPT | Performed by: OBSTETRICS & GYNECOLOGY

## 2024-09-24 PROCEDURE — 86900 BLOOD TYPING SEROLOGIC ABO: CPT | Performed by: OBSTETRICS & GYNECOLOGY

## 2024-09-24 PROCEDURE — 86780 TREPONEMA PALLIDUM: CPT | Performed by: OBSTETRICS & GYNECOLOGY

## 2024-09-24 PROCEDURE — 87340 HEPATITIS B SURFACE AG IA: CPT | Performed by: OBSTETRICS & GYNECOLOGY

## 2024-09-24 PROCEDURE — 76817 TRANSVAGINAL US OBSTETRIC: CPT | Performed by: OBSTETRICS & GYNECOLOGY

## 2024-09-24 PROCEDURE — 83036 HEMOGLOBIN GLYCOSYLATED A1C: CPT | Performed by: OBSTETRICS & GYNECOLOGY

## 2024-09-24 PROCEDURE — 86704 HEP B CORE ANTIBODY TOTAL: CPT | Performed by: OBSTETRICS & GYNECOLOGY

## 2024-09-24 PROCEDURE — 86850 RBC ANTIBODY SCREEN: CPT | Performed by: OBSTETRICS & GYNECOLOGY

## 2024-09-24 PROCEDURE — 86803 HEPATITIS C AB TEST: CPT | Performed by: OBSTETRICS & GYNECOLOGY

## 2024-09-24 PROCEDURE — 86901 BLOOD TYPING SEROLOGIC RH(D): CPT | Performed by: OBSTETRICS & GYNECOLOGY

## 2024-09-24 PROCEDURE — 99459 PELVIC EXAMINATION: CPT | Performed by: OBSTETRICS & GYNECOLOGY

## 2024-09-24 PROCEDURE — 87389 HIV-1 AG W/HIV-1&-2 AB AG IA: CPT | Performed by: OBSTETRICS & GYNECOLOGY

## 2024-09-24 RX ORDER — ONDANSETRON 4 MG/1
4 TABLET, ORALLY DISINTEGRATING ORAL EVERY 8 HOURS PRN
Qty: 30 TABLET | Refills: 2 | Status: SHIPPED | OUTPATIENT
Start: 2024-09-24

## 2024-09-24 RX ORDER — OMEPRAZOLE 40 MG/1
40 CAPSULE, DELAYED RELEASE ORAL DAILY
Qty: 90 CAPSULE | Refills: 3 | Status: SHIPPED | OUTPATIENT
Start: 2024-09-24

## 2024-09-24 RX ORDER — PRENATAL VIT/IRON FUM/FOLIC AC 27MG-0.8MG
1 TABLET ORAL DAILY
Qty: 90 TABLET | Refills: 3 | Status: SHIPPED | OUTPATIENT
Start: 2024-09-24

## 2024-09-24 ASSESSMENT — EDINBURGH POSTNATAL DEPRESSION SCALE (EPDS)
THINGS HAVE BEEN GETTING ON TOP OF ME: YES, MOST OF THE TIME I HAVEN'T BEEN ABLE TO COPE AT ALL
I HAVE BEEN ANXIOUS OR WORRIED FOR NO GOOD REASON: NO, NOT AT ALL
I HAVE BEEN SO UNHAPPY THAT I HAVE BEEN CRYING: NO, NEVER
I HAVE LOOKED FORWARD WITH ENJOYMENT TO THINGS: AS MUCH AS I EVER DID
THE THOUGHT OF HARMING MYSELF HAS OCCURRED TO ME: NEVER
I HAVE BEEN SO UNHAPPY THAT I HAVE HAD DIFFICULTY SLEEPING: NOT AT ALL
TOTAL SCORE: 3
I HAVE BEEN ABLE TO LAUGH AND SEE THE FUNNY SIDE OF THINGS: AS MUCH AS I ALWAYS COULD
I HAVE BLAMED MYSELF UNNECESSARILY WHEN THINGS WENT WRONG: NO, NEVER
I HAVE FELT SCARED OR PANICKY FOR NO GOOD REASON: NO, NOT AT ALL
I HAVE FELT SAD OR MISERABLE: NO, NOT AT ALL

## 2024-09-24 NOTE — PROGRESS NOTES
[facility]    New OB Visit Note    CC: New OB     Subjective:    Asmita is a 34 year old  at 7w4d by US today who presents for her initial OB visit. She reports feeling fairly well. Denies any uterine cramping, abdominal pain or vaginal bleeding. Reports some nausea and vomiting.       Past OB History:     OB History    Para Term  AB Living   4 3 3 0 0 3   SAB IAB Ectopic Multiple Live Births   0 0 0 0 3      # Outcome Date GA Lbr [place]/2nd Weight Sex Type Anes PTL Lv   4 Current            3 Term 23 37w4d  3.4 kg (7 lb 7.9 oz) M CS-LTranv  N ROYAL      Name: Horacio BARRAZA Aura      Apgar1: 9  Apgar5: 9   2 Term 16 39w0d  3.43 kg (7 lb 9 oz) M CS-Unspec   ROYAL      Birth Comments: CAN      Name: Mario Barrientos Term 12/29/10 40w0d  3.629 kg (8 lb) M CS-Unspec   ROYAL      Complications: Failure to Progress in Second Stage      Name: Roman       Past Medical History:   Diagnosis Date    Chickenpox        Past Surgical History:   Procedure Laterality Date    APPENDECTOMY       SECTION N/A 2023    Procedure:  SECTION;  Surgeon: Melodie Sesay DO;  Location: [place] OR    GYN SURGERY         Current Outpatient Medications   Medication Sig Dispense Refill    omeprazole (PRILOSEC) 40 MG DR capsule Take 1 capsule (40 mg) by mouth daily. 90 capsule 3    ondansetron (ZOFRAN ODT) 4 MG ODT tab Take 1 tablet (4 mg) by mouth every 8 hours as needed for nausea. 30 tablet 2    Prenatal Vit-Fe Fumarate-FA (PRENATAL MULTIVITAMIN W/IRON) 27-0.8 MG tablet Take 1 tablet by mouth daily. 90 tablet 3    Prenatal Vit-Fe Fumarate-FA (PRENATAL MULTIVITAMIN W/IRON) 27-0.8 MG tablet Take 1 tablet by mouth daily         Family History   Problem Relation Age of Onset    No Known Problems Mother     Heart Disease Father        Social History     Tobacco Use    Smoking status: Former     Current packs/day: 0.00     Types: Cigarettes     Quit date: 2023     Years since quitting:  "1.6    Smokeless tobacco: Never   Substance Use Topics    Alcohol use: Not Currently       ROS: A 10 pt ROS was completed and found to be negative unless mentioned in the HPI.     Objective:    /68 (BP Location: Left arm, Patient Position: Sitting, Cuff Size: Adult Large)   Pulse 78   Temp 98.4  F (36.9  C) (Tympanic)   Resp 18   Ht 1.549 m (5' 1\")   Wt 90.9 kg (200 lb 6.4 oz)   LMP 2024   BMI 37.87 kg/m      Estimated body mass index is 37.87 kg/m  as calculated from the following:    Height as of this encounter: 1.549 m (5' 1\").    Weight as of this encounter: 90.9 kg (200 lb 6.4 oz).    General appearance: well-hydrated, A&O x 3, no apparent distress  Lungs: Equal expansion bilaterally, no accessory muscle use  Heart: No heaves or t/hrills. No peripheral varicosities  Constitutional: See vitals  Extremities: no edema  Genitourinary:  External genitalia: no erythema, no lesions.   Anus and Perineum: Unremarkable, no visible lesions  Vagina: Normal, healthy pink mucosa without any lesions. Physiologic vaginal discharge.   Cervix: normal appearance, no cervical motion tenderness.   Uterus: gravid    Bedside Ultrasound    Transvaginal ultrasound was performed. A single live intrauterine pregnancy was seen.      CRL= 1.38 cm   FHT= visible FHT in the 170-180s bpm, US unable to calculate due to early gestation and elevated BMI  EGA= 7 weeks 4 days  EDC based on US = 25  EDC by LMP= 25  FINAL EDC= 25    Assessment and Plan:     Encounter Diagnoses   Name Primary?    Prenatal care, subsequent pregnancy in first trimester Yes    Obesity, Class II, BMI 35-39.9     Multigravida of advanced maternal age in second trimester     History of  section     History of gestational diabetes     Screening for STD (sexually transmitted disease)     Nausea     Gastroesophageal reflux disease with esophagitis without hemorrhage        34 year old  at 7w4d by US today who presents for her " initial OB visit.     1) Plan to draw new OB lab today   2) Discussed routine prenatal care, group practice model at [facility], tertiary support and frequency of visits. Also discussed options for genetic screening tests. Patient undecided for genetic screening.   3) Dating/viability US performed today. NEED TO REPEAT DATING US AND CONFIRM EDC. US today not adequate due to poor resolution, early gestation in setting of elevated BMI. Repeat ordered.  4) Concerns: some nausea and vomiting  5) PMH/OBHx problems:    -Hx GDMA2: no post partum testing done. Will check Hba1c today.   -Hx C section x3: plan C section for delivery   -AMA: plan L2   -Obesity: BMI 38, Hba1c ordered today. Plan growth US at 32 weeks, BPPs at 37 weeks.    -2 risk factors for pre-e: recommend ASA   6) Medication review: Zofran for nausea, omeprazole for GERD, continue prenatal vitamin   7) Reviewed miscarriage precautions (present to ED or call clinic with abdominal pain, vaginal bleeding or fever)   8) Weight gain: discussed weight gain expectations (BMI <18.5: 28-40lbs, BMI 18.5-25: 25-35lbs, BMI 25-30: 15-25lbs, BMI >30: 11-20lbs)   9) PAP smear: up to date and normal  10) Delivery plan: continue to discuss route of delivery, breastfeeding, pp contraception, pain management in labor  11) Immunizations: Tdap at 28wks  12) No increased risk for gestational diabetes, plan for screen at 28wks     Return to clinic in 4 weeks.     Melodie Sesay DO

## 2024-09-24 NOTE — NURSING NOTE
"Initial /68 (BP Location: Left arm, Patient Position: Sitting, Cuff Size: Adult Large)   Pulse 78   Temp 98.4  F (36.9  C) (Tympanic)   Resp 18   Ht 1.549 m (5' 1\")   Wt 90.9 kg (200 lb 6.4 oz)   LMP 07/16/2024   BMI 37.87 kg/m   Estimated body mass index is 37.87 kg/m  as calculated from the following:    Height as of this encounter: 1.549 m (5' 1\").    Weight as of this encounter: 90.9 kg (200 lb 6.4 oz). .    "

## 2024-09-25 LAB
BACTERIA UR CULT: NORMAL
HIV 1+2 AB+HIV1 P24 AG SERPL QL IA: NONREACTIVE

## 2024-10-02 ENCOUNTER — HOSPITAL ENCOUNTER (OUTPATIENT)
Dept: ULTRASOUND IMAGING | Facility: CLINIC | Age: 34
Discharge: HOME | End: 2024-10-02
Attending: OBSTETRICS & GYNECOLOGY | Admitting: OBSTETRICS & GYNECOLOGY
Payer: COMMERCIAL

## 2024-10-02 DIAGNOSIS — Z34.81 PRENATAL CARE, SUBSEQUENT PREGNANCY IN FIRST TRIMESTER: ICD-10-CM

## 2024-10-02 PROCEDURE — 76801 OB US < 14 WKS SINGLE FETUS: CPT

## 2024-10-25 ENCOUNTER — PRENATAL OFFICE VISIT (OUTPATIENT)
Dept: OBGYN | Facility: CLINIC | Age: 34
End: 2024-10-25
Payer: COMMERCIAL

## 2024-10-25 VITALS
BODY MASS INDEX: 37.57 KG/M2 | TEMPERATURE: 98.2 F | HEIGHT: 61 IN | HEART RATE: 92 BPM | RESPIRATION RATE: 18 BRPM | DIASTOLIC BLOOD PRESSURE: 67 MMHG | WEIGHT: 199 LBS | SYSTOLIC BLOOD PRESSURE: 127 MMHG

## 2024-10-25 DIAGNOSIS — Z98.891 HISTORY OF C-SECTION: ICD-10-CM

## 2024-10-25 DIAGNOSIS — Z86.32 HISTORY OF GESTATIONAL DIABETES MELLITUS (GDM): ICD-10-CM

## 2024-10-25 DIAGNOSIS — O09.522 MULTIGRAVIDA OF ADVANCED MATERNAL AGE IN SECOND TRIMESTER: Primary | ICD-10-CM

## 2024-10-25 DIAGNOSIS — Z23 NEED FOR PROPHYLACTIC VACCINATION AND INOCULATION AGAINST INFLUENZA: ICD-10-CM

## 2024-10-25 DIAGNOSIS — Z34.92 PRENATAL CARE IN SECOND TRIMESTER: ICD-10-CM

## 2024-10-25 PROCEDURE — 90471 IMMUNIZATION ADMIN: CPT | Performed by: ADVANCED PRACTICE MIDWIFE

## 2024-10-25 PROCEDURE — 99207 PR PRENATAL VISIT: CPT | Performed by: ADVANCED PRACTICE MIDWIFE

## 2024-10-25 PROCEDURE — 90656 IIV3 VACC NO PRSV 0.5 ML IM: CPT | Performed by: ADVANCED PRACTICE MIDWIFE

## 2024-10-25 NOTE — PROGRESS NOTES
Here with partner and their young son. Feeling well. Denies any leaking of fluid, vaginal bleeding, regular uterine contractions, or headaches or other concerns.  Agrees to flu shot.    Genetic testing offered and declined.  FOB told me that he has 8 children!  AMA - plan level 2/MFM  US.  History of GDM - hemoglobin A1C normal.    History of 3 section X3, plans repeat.    Reviewed to call for contractions, loss of fluid, vaginal bleeding, decreased fetal movement or any other questions or concerns.    RTC in 4 weeks.  Kellie Pham, DNP, APRN, CNM

## 2024-10-25 NOTE — NURSING NOTE
"Initial /67 (BP Location: Right arm, Patient Position: Chair, Cuff Size: Adult Regular)   Pulse 92   Temp 98.2  F (36.8  C) (Tympanic)   Resp 18   Ht 1.549 m (5' 1\")   Wt 90.3 kg (199 lb)   LMP 07/16/2024   BMI 37.60 kg/m   Estimated body mass index is 37.6 kg/m  as calculated from the following:    Height as of this encounter: 1.549 m (5' 1\").    Weight as of this encounter: 90.3 kg (199 lb). .  "

## 2024-11-20 NOTE — PROGRESS NOTES
Return OB-- 15w6d    S: Feels well.  Possible +FM.  Occasional round ligament pain.  No cramping/bleeding.    O: See flowsheet        A/P: 34 year old  @ 15w6d  1) NOB labs: A positive, normal  2) Declined genetic screening.  3) AMA: Referral placed for L2 US today  4) Medication review: Zofran for nausea, omeprazole for GERD, continue prenatal vitamin    5) S/p flu shot    6) PMH/OBHx problems:   -Hx GDMA2: no post partum testing done. First trimester A1c 5.2.  -Hx C section x3: plan C section for delivery  -AMA: plan L2  -Obesity: BMI 38, First tri Hba1c normal. Plan growth US at 32 weeks, BPPs at 37 weeks.   -2 risk factors for pre-e: Baby ASA     RTC 4 weeks.    Crista Garnett MD

## 2024-11-21 ENCOUNTER — PRENATAL OFFICE VISIT (OUTPATIENT)
Dept: OBGYN | Facility: CLINIC | Age: 34
End: 2024-11-21
Payer: COMMERCIAL

## 2024-11-21 VITALS
BODY MASS INDEX: 37.38 KG/M2 | TEMPERATURE: 98.4 F | HEART RATE: 74 BPM | DIASTOLIC BLOOD PRESSURE: 68 MMHG | HEIGHT: 61 IN | SYSTOLIC BLOOD PRESSURE: 127 MMHG | WEIGHT: 198 LBS

## 2024-11-21 DIAGNOSIS — O09.522 MULTIGRAVIDA OF ADVANCED MATERNAL AGE IN SECOND TRIMESTER: Primary | ICD-10-CM

## 2024-11-21 DIAGNOSIS — E66.812 OBESITY, CLASS II, BMI 35-39.9: ICD-10-CM

## 2024-11-21 DIAGNOSIS — Z34.82 PRENATAL CARE, SUBSEQUENT PREGNANCY IN SECOND TRIMESTER: ICD-10-CM

## 2024-11-21 DIAGNOSIS — Z34.81 PRENATAL CARE, SUBSEQUENT PREGNANCY IN FIRST TRIMESTER: ICD-10-CM

## 2024-11-21 DIAGNOSIS — Z86.32 HISTORY OF GESTATIONAL DIABETES: ICD-10-CM

## 2024-11-21 DIAGNOSIS — Z98.891 HISTORY OF CESAREAN SECTION: ICD-10-CM

## 2024-11-21 RX ORDER — PRENATAL VIT/IRON FUM/FOLIC AC 27MG-0.8MG
1 TABLET ORAL DAILY
Qty: 90 TABLET | Refills: 3 | Status: SHIPPED | OUTPATIENT
Start: 2024-11-21

## 2024-11-21 NOTE — PATIENT INSTRUCTIONS
"Weeks 14 to 18 of Your Pregnancy: Care Instructions  Around this time, you may start to look pregnant. Your baby is now able to pass urine. And the first stool (meconium) is starting to collect in your baby's intestines. Hair is starting to grow on your baby's head.    You may notice some skin changes, such as itchy spots on your palms or acne on your face.   At your next doctor visit, you may have an ultrasound. So you might think about whether you want to know the sex of your baby. Also ask your doctor about flu and COVID-19 shots.      How to reduce stress   Ask for help when you need it.  Try to avoid things that cause you stress.  Seek out things that relieve stress, such as breathing exercises or yoga.     How to get exercise   If you don't usually exercise, start slowly. Short walks may be a good choice.  Try to be active 30 minutes a day, at least 5 days a week.  Avoid activities where you're more likely to fall.  Use light weights to reduce stress on your joints.     How to stay at a healthy weight for you   Talk to your doctor or midwife about how much weight you should gain.  It's generally best to gain:  About 28 to 40 pounds if you're underweight.  About 25 to 35 pounds if you're at a healthy weight.  About 15 to 25 pounds if you're overweight.  About 11 to 20 pounds if you're very overweight (obese).  Follow-up care is a key part of your treatment and safety. Be sure to make and go to all appointments, and call your doctor if you are having problems. It's also a good idea to know your test results and keep a list of the medicines you take.  Where can you learn more?  Go to https://www.[employer].net/patiented  Enter I453 in the search box to learn more about \"Weeks 14 to 18 of Your Pregnancy: Care Instructions.\"  Current as of: July 10, 2023  Content Version: 14.2 2024 [employer].   Care instructions adapted under license by your healthcare professional. If you have questions about a " medical condition or this instruction, always ask your healthcare professional. [employer], [facility] disclaims any warranty or liability for your use of this information.    Second-Trimester Fetal Ultrasound: About This Test  What is it?     Fetal ultrasound is a test that uses sound waves to make pictures of your baby (fetus) and placenta inside the uterus. The test is the safest way to find out the age, size, and position of your baby. You also may be able to find out the sex of your baby. (But the test isn't done just to find out a baby's sex.)  No known risks to the mother or the baby are linked to fetal ultrasound. But you may feel anxious if the test reveals a problem with your pregnancy or baby.  Why is this test done?  In the second trimester, a fetal ultrasound is done to:  Estimate the number of weeks and days a fetus has developed since the beginning of the pregnancy. This is called the gestational age.  Look at the size and position of the fetus, the placenta, and the fluid that surrounds the fetus.  Find major birth defects, such as heart problems or problems with the brain and spinal cord (neural tube defects). But the test may not be able to find many minor defects and some major birth defects.  How do you prepare for the test?  In general, there's nothing you have to do before this test, unless your doctor tells you to.  How is the test done?  You may be able to leave your clothes on, or you will be given a gown to wear.  You will lie on your back on a padded examination table.  A gel will be spread on your belly. It will be removed after the test.  A small, handheld device called a transducer will be pressed against the gel on your skin and moved across your belly several times.  You may watch the monitor to see the picture of your baby during the test.  What happens after the test?  You will probably be able to go home right away.  You most likely will be able to go back to your usual  "activities right away.  Follow-up care is a key part of your treatment and safety. Be sure to make and go to all appointments, and call your doctor if you are having problems. It's also a good idea to keep a list of the medicines you take. Ask your doctor when you can expect to have your test results.  Where can you learn more?  Go to https://www.[email or web address]/patiented  Enter Y671 in the search box to learn more about \"Second-Trimester Fetal Ultrasound: About This Test.\"  Current as of: July 10, 2023  Content Version: 14.2 2024 [employer].   Care instructions adapted under license by your healthcare professional. If you have questions about a medical condition or this instruction, always ask your healthcare professional. [employer] disclaims any warranty or liability for your use of this information.    During Pregnancy: Exercises  Introduction  Here are some examples of exercises to do during your pregnancy. Start each exercise slowly. Ease off the exercise if you start to have pain.  Talk to your doctor about when you can start these exercises and which ones will work best for you.  How to do the exercises  Neck rotation    Sit up straight in a firm chair, or stand up straight. If you're standing, keep your feet about hip-width apart.  Keeping your chin level, turn your head to the right and hold for 15 to 30 seconds.  Turn your head to the left and hold for 15 to 30 seconds.  Repeat 2 to 4 times.  Neck stretch to the front    Sit up straight in a firm chair, or stand up straight. Look straight ahead. If you're standing, keep your feet about hip-width apart.  Slowly bend your head forward without moving your shoulders.  Hold for 15 to 30 seconds, then return to your starting position.  Repeat 2 to 4 times.  Back press    Stand with your back 10 to 12 inches away from a wall.  Lean into the wall until your back is against it. Press your lower back against the wall by pulling in your " stomach muscles.  Slowly slide down until your knees are slightly bent, pressing your lower back against the wall.  Hold for at least 6 seconds, then slide back up the wall.  Repeat 8 to 12 times.  Over time, work up to holding this position for as much as 1 minute.  Trunk twist    Sit on the floor with your legs crossed. If that's not comfortable, you can sit on a folded blanket so your bottom is a few inches off the floor. Or you can sit on a chair with your knees hip-width apart and your feet flat on the floor.  Reach your left hand toward your right knee. You can place your right hand at your side for support.  Slowly twist your body (trunk) to your right.  Relax and return to your starting position.  Repeat 2 to 4 times.  Switch your hands and twist to your left.  Repeat 2 to 4 times.  Pelvic rocking on hands and knees    Start on your hands and knees. Place your wrists directly below your shoulders and your knees below your hips.  Breathe in slowly. Tuck your head downward and round your back up, making a curve with your back in the shape of the letter C. Hold this position for about 6 seconds.  Breathe out slowly and bring your head back up. Relax, keeping your back straight. (Don't allow it to curve toward the floor.) Hold for about 6 seconds.  Repeat 8 to 12 times, gently rocking your pelvis.  Pelvic tilt    Lie on your back with your knees bent and your feet flat on the floor.  Tighten your belly muscles by pulling your belly button in toward your spine. Press your lower back to the floor. You should feel your hips and pelvis rock back.  Hold for 6 seconds while breathing smoothly, and then relax.  Repeat 8 to 12 times.  Do this exercise only during the first 4 months of pregnancy. After this point, lying on your back is not recommended, because it can cause blood flow problems for you and your baby.  Backward stretch    Start on your hands and knees with your knees 8 to 10 inches apart, hands directly  below your shoulders, and arms and back straight.  Keeping your arms straight, slowly lower your buttocks toward your heels and tuck your head toward your knees. Hold for 15 to 30 seconds.  Slowly return to the starting position.  Repeat 2 to 4 times.  Forward bend    Sit comfortably in a chair, with your arms relaxed.  Slowly bend forward, allowing your arms to hang down. Lean only as far as you can without feeling discomfort or pressure on your belly.  Hold for 15 to 30 seconds and then slowly sit up straight.  Repeat 2 to 4 times or to your comfort level.  Donkey kick    Start on your hands and knees. Place your hands directly below your shoulders, and keep your arms straight.  Tighten your belly muscles by pulling your belly button in toward your spine. Keep breathing normally, and don't hold your breath.  Lift one knee and bring it toward your elbow.  Slowly extend that leg behind you without completely straightening it. Be careful not to let your hip drop down. Avoid arching your back.  Hold your leg behind you for about 6 seconds.  Return to your starting position.  Repeat 8 to 12 times for each leg.  Tailor sitting    Sit on the floor.  Bring your feet close to your body while crossing your ankles.  Keep your back straight. Relax your legs and let your knees drop toward the floor.  Hold this position for as long as you are comfortable.  Toe reach    Sit on the floor with your back straight, legs about 12 inches apart, and feet relaxed outward.  Stretch your hands forward toward your right foot, then sit up.  Stretch your hands straight forward, then sit up.  Stretch your hands forward toward your left foot, then sit up.  Hold each stretch for 15 to 30 seconds.  Repeat 2 to 4 times.  Follow-up care is a key part of your treatment and safety. Be sure to make and go to all appointments, and call your doctor if you are having problems. It's also a good idea to know your test results and keep a list of the  medicines you take.  Current as of: July 10, 2023  Content Version: 14.2 2024 [facility] [employer].   Care instructions adapted under license by your healthcare professional. If you have questions about a medical condition or this instruction, always ask your healthcare professional. [employer] disclaims any warranty or liability for your use of this information.

## 2025-01-13 ENCOUNTER — PRE VISIT (OUTPATIENT)
Dept: MATERNAL FETAL MEDICINE | Facility: HOSPITAL | Age: 35
End: 2025-01-13
Payer: MEDICAID

## 2025-01-15 ENCOUNTER — PRENATAL OFFICE VISIT (OUTPATIENT)
Dept: OBGYN | Facility: CLINIC | Age: 35
End: 2025-01-15
Payer: MEDICAID

## 2025-01-15 ENCOUNTER — OFFICE VISIT (OUTPATIENT)
Dept: MATERNAL FETAL MEDICINE | Facility: HOSPITAL | Age: 35
End: 2025-01-15
Attending: OBSTETRICS & GYNECOLOGY
Payer: MEDICAID

## 2025-01-15 ENCOUNTER — ANCILLARY PROCEDURE (OUTPATIENT)
Dept: ULTRASOUND IMAGING | Facility: HOSPITAL | Age: 35
End: 2025-01-15
Attending: OBSTETRICS & GYNECOLOGY
Payer: MEDICAID

## 2025-01-15 VITALS
BODY MASS INDEX: 39.01 KG/M2 | RESPIRATION RATE: 18 BRPM | SYSTOLIC BLOOD PRESSURE: 121 MMHG | HEIGHT: 61 IN | HEART RATE: 85 BPM | WEIGHT: 206.6 LBS | DIASTOLIC BLOOD PRESSURE: 73 MMHG | TEMPERATURE: 96.2 F

## 2025-01-15 DIAGNOSIS — O09.522 ADVANCED MATERNAL AGE IN MULTIGRAVIDA, SECOND TRIMESTER: Primary | ICD-10-CM

## 2025-01-15 DIAGNOSIS — O26.90 PREGNANCY RELATED CONDITION: ICD-10-CM

## 2025-01-15 DIAGNOSIS — O99.212 OBESITY AFFECTING PREGNANCY IN SECOND TRIMESTER, UNSPECIFIED OBESITY TYPE: ICD-10-CM

## 2025-01-15 DIAGNOSIS — Z34.83 PRENATAL CARE, SUBSEQUENT PREGNANCY IN THIRD TRIMESTER: Primary | ICD-10-CM

## 2025-01-15 PROCEDURE — 76811 OB US DETAILED SNGL FETUS: CPT

## 2025-01-15 PROCEDURE — 99207 PR NO CHARGE LOS: CPT | Performed by: OBSTETRICS & GYNECOLOGY

## 2025-01-15 NOTE — NURSING NOTE
Asmita Frye is a  at 23w5d who presents to [facility] for L2 ultrasound. Pt reports positive fetal movement. Pt denies bldg/lof/change in discharge, contractions, headache, vision changes, chest pain/SOB or edema. SBAR given to Dr. Rhoades, see note in Epic.

## 2025-01-15 NOTE — PROGRESS NOTES
Please see the imaging tab for details of the ultrasound performed today.    Nivia Rhoades MD  Specialist in Maternal-Fetal Medicine

## 2025-01-15 NOTE — NURSING NOTE
"Initial /73 (BP Location: Right arm, Patient Position: Sitting, Cuff Size: Adult Large)   Pulse 85   Temp (!) 96.2  F (35.7  C) (Tympanic)   Resp 18   Ht 1.549 m (5' 1\")   Wt 93.7 kg (206 lb 9.6 oz)   LMP 07/16/2024   BMI 39.04 kg/m   Estimated body mass index is 39.04 kg/m  as calculated from the following:    Height as of this encounter: 1.549 m (5' 1\").    Weight as of this encounter: 93.7 kg (206 lb 9.6 oz). .    "

## 2025-01-15 NOTE — PROGRESS NOTES
" @ 23 wks 5/7 wks. MFM level 2 US earlier today normal. Pt has noted good FM  /73 (BP Location: Right arm, Patient Position: Sitting, Cuff Size: Adult Large)   Pulse 85   Temp (!) 96.2  F (35.7  C) (Tympanic)   Resp 18   Ht 1.549 m (5' 1\")   Wt 93.7 kg (206 lb 9.6 oz)   LMP 2024   BMI 39.04 kg/m    FH 30    A/P:   AMA - nl level 2 US. Recommend repeat growth @ 32 weeks and weekly BPP starting @ 37 wks. Previously declined NIPT.  History of GDMA2 - nl A1c @ first ob visit. Plan one hr glucose in 4 wks  History of  x 3 - plan repeat   Undesired fertility - expressed interest in bilateral salpingectomy @ time of . Risks and benefits reviewed. Plan to discuss again at next visit and sign tubal papers if needed.       Homa Jain MD      "

## 2025-01-15 NOTE — PATIENT INSTRUCTIONS
"Weeks 22 to 26 of Your Pregnancy: Care Instructions  Your baby's lungs are getting ready for breathing. Your baby may respond to your voice. Your baby likely turns less, and kicks or jerks more. Jerking may mean that your baby has hiccups.    Think about taking childbirth classes. And start to think about whether you want to have pain medicine during labor.   At your next doctor visit, you may be tested for anemia and for high blood sugar that first occurs during pregnancy (gestational diabetes). These conditions can cause problems for you and your baby.         To ease discomfort, such as back pain   Change your position often. Try not to sit or stand for too long.  Get some exercise. Things like walking or stretching may help.  Try using a heating pad or cold pack.        To ease or reduce swelling in your feet, ankles, hands, and fingers   Take off your rings.  Avoid high-sodium foods, such as potato chips.  Prop up your feet, and sleep with pillows under your feet.  Try to avoid standing for long periods of time.  Do not wear tight shoes.  Wear support stockings.  Kegel exercises to prevent urine from leaking    Squeeze your muscles as if you were trying not to pass gas. Your belly, legs, and buttocks shouldn't move. Hold the squeeze for 3 seconds, then relax for 5 to 10 seconds.    Add 1 second each week until you can squeeze for 10 seconds. Repeat the exercise 10 times a session. Do 3 to 8 sessions a day. If these exercises cause you pain, stop doing them and talk with your doctor.  Follow-up care is a key part of your treatment and safety. Be sure to make and go to all appointments, and call your doctor if you are having problems. It's also a good idea to know your test results and keep a list of the medicines you take.  Where can you learn more?  Go to https://www.healthwise.net/patiented  Enter G264 in the search box to learn more about \"Weeks 22 to 26 of Your Pregnancy: Care Instructions.\"  Current as of: " April 30, 2024  Content Version: 14.3    2024 [employer].   Care instructions adapted under license by your healthcare professional. If you have questions about a medical condition or this instruction, always ask your healthcare professional. [employer] disclaims any warranty or liability for your use of this information.    Round Ligament Pain: Care Instructions  Overview     Round ligament pain is a common pain during pregnancy. You may feel a sharp brief pain on one or both sides of your belly. It may go down into your groin. It's usually felt for the first time during the second trimester. This pain is a normal part of pregnancy.  Your uterus is supported by two ligaments that go from the top and sides of the uterus to the bones of the pelvis. These are the round ligaments. As your uterus grows, these ligaments stretch and tighten with your movements. This may be the cause of the pain. You may find that certain activities seem to cause pain. If you can, avoid those activities.  Your doctor can usually diagnose round ligament pain from your symptoms and an exam. If you have bleeding or other symptoms, your doctor may also do an imaging test, such as an ultrasound. Your doctor may suggest some things that can help the pain, such as rest and strengthening exercises.  Follow-up care is a key part of your treatment and safety. Be sure to make and go to all appointments, and call your doctor if you are having problems. It's also a good idea to know your test results and keep a list of the medicines you take.  How can you care for yourself at home?  If certain movements seem to trigger belly pain, see if you can avoid them or try moving more slowly so the ligaments don't stretch quickly.  Stay active. If your doctor says it's okay, try moderate exercise. You might try things like swimming, walking, or stretching. Ask your doctor about strengthening and stretching exercises that may help.  Try a  "heating pad or cold pack on the area. A warm bath or shower may also help.  Rest when you can.  Ask your doctor about taking acetaminophen for pain. Be safe with medicines. Read and follow all instructions on the label.  Try a belly support band. Some people find that these can help.  When should you call for help?   Call your doctor now or seek immediate medical care if:    You think you might be in labor.     You have new or worse pain.   Watch closely for changes in your health, and be sure to contact your doctor if you have any problems.  Where can you learn more?  Go to https://www.[employer].net/patiented  Enter R110 in the search box to learn more about \"Round Ligament Pain: Care Instructions.\"  Current as of: April 30, 2024  Content Version: 14.3    2024 [employer].   Care instructions adapted under license by your healthcare professional. If you have questions about a medical condition or this instruction, always ask your healthcare professional. [employer] disclaims any warranty or liability for your use of this information.    Leg and Ankle Edema: Care Instructions  Your Care Instructions  Swelling in the legs, ankles, and feet is called edema. It is common after you sit or stand for a while. Long plane flights or car rides often cause swelling in the legs and feet. You may also have swelling if you have to stand for long periods of time at your job. Problems with the veins in the legs (varicose veins) and changes in hormones can also cause swelling. Sometimes the swelling in the ankles and feet is caused by a more serious problem, such as heart failure, infection, blood clots, or liver or kidney disease.  Follow-up care is a key part of your treatment and safety. Be sure to make and go to all appointments, and call your doctor if you are having problems. It's also a good idea to know your test results and keep a list of the medicines you take.  How can you care for yourself at " "home?  If your doctor gave you medicine, take it as prescribed. Call your doctor if you think you are having a problem with your medicine.  Whenever you are resting, raise your legs up. Try to keep the swollen area higher than the level of your heart.  Take breaks from standing or sitting in one position.  Walk around to increase the blood flow in your lower legs.  Move your feet and ankles often while you stand, or tighten and relax your leg muscles.  Wear support stockings. Put them on in the morning, before swelling gets worse.  Eat a balanced diet. Lose weight if you need to.  Limit the amount of salt (sodium) in your diet. Salt holds fluid in the body and may increase swelling.  When should you call for help?   Call 911 anytime you think you may need emergency care. For example, call if:    You have symptoms of a blood clot in your lung (called a pulmonary embolism). These may include:  Sudden chest pain.  Trouble breathing.  Coughing up blood.   Call your doctor now or seek immediate medical care if:    You have signs of a blood clot, such as:  Pain in your calf, back of the knee, thigh, or groin.  Redness and swelling in your leg or groin.     You have symptoms of infection, such as:  Increased pain, swelling, warmth, or redness.  Red streaks or pus.  A fever.   Watch closely for changes in your health, and be sure to contact your doctor if:    Your swelling is getting worse.     You have new or worsening pain in your legs.     You do not get better as expected.   Where can you learn more?  Go to https://www.[employer].net/patiented  Enter N696 in the search box to learn more about \"Leg and Ankle Edema: Care Instructions.\"  Current as of: April 30, 2024  Content Version: 14.3    2024 [employer].   Care instructions adapted under license by your healthcare professional. If you have questions about a medical condition or this instruction, always ask your healthcare professional. [employer] disclaims any warranty or liability for your use of this information.    Back Pain During Pregnancy: Care Instructions  Overview     Back pain has many possible causes. It is often caused by problems with muscles and ligaments in your back. The extra weight during pregnancy can put stress on your back. Moving, lifting, standing, sitting, or sleeping in an awkward way also can strain your back. Back pain can also be a sign of labor. Although it may hurt a lot, back pain often improves on its own. Use good home treatment, and take care not to stress your back.  Follow-up care is a key part of your treatment and safety. Be sure to make and go to all appointments, and call your doctor if you are having problems. It's also a good idea to know your test results and keep a list of the medicines you take.  How can you care for yourself at home?  Ask your doctor about taking acetaminophen (Tylenol) for pain. Do not take aspirin, ibuprofen (Advil, Motrin), or naproxen (Aleve).  Do not take two or more pain medicines at the same time unless the doctor told you to. Many pain medicines have acetaminophen, which is Tylenol. Too much acetaminophen (Tylenol) can be harmful.  Try heat or ice, whichever feels better. Apply it for 10 to 20 minutes at a time, several times a day. Put a thin cloth between the heat or ice and your skin. A warm bath or shower may also help.  Lie on your left side with your knees and hips bent and a pillow between your legs. This reduces stress on your back.  Try to avoid standing or sitting for too long or heavy lifting. If your job requires lots of standing, sitting, or heavy lifting, ask your employer if you can take short breaks or adjust your work activity. You can ask your doctor to write a note requesting these breaks or other adjustments.  Wear supportive, low-heeled shoes. Avoid flat or high-heeled shoes.  Try a belly support band. Supporting your belly can take the strain off your back.  Ask  "your doctor about how much exercise you can do. Regular exercise such as swimming, water aerobics, walking, or stretching can help with back pain.  Ask your doctor about exercises to stretch, strengthen, and relax your muscles. Your doctor may recommend physical therapy.  When should you call for help?   Call your doctor now or seek immediate medical care if:    You've been having regular contractions for an hour. This means that you've had at least 6 contractions within 1 hour, even after you change your position and drink fluids.     You have new numbness in your buttocks, genital or rectal areas, or legs.     You have a new loss of bowel or bladder control.     You have symptoms of a urinary tract infection. These may include:  Pain or burning when you urinate.  A frequent need to urinate without being able to pass much urine.  Pain in the flank, which is just below the rib cage and above the waist on either side of the back.  Blood in your urine.   Watch closely for changes in your health, and be sure to contact your doctor if:    You do not get better as expected.   Where can you learn more?  Go to https://www.[employer].net/patiented  Enter C696 in the search box to learn more about \"Back Pain During Pregnancy: Care Instructions.\"  Current as of: 2024  Content Version: 14.3    2024 [employer]   Care instructions adapted under license by your healthcare professional. If you have questions about a medical condition or this instruction, always ask your healthcare professional. [employer] disclaims any warranty or liability for your use of this information.     Labor: Care Instructions  Overview      labor is the start of labor between 20 and 36 weeks of pregnancy. Most babies are born at 37 to 42 weeks of pregnancy. In labor, the uterus contracts to open the cervix. This is the first stage of childbirth.  labor can be caused by a problem with the baby, the " mother, or both. Often the cause is not known.  In some cases, doctors use medicines to try to delay labor until 34 or more weeks of pregnancy. By this time, a baby has grown enough so that problems are not likely. In some cases--such as with a serious infection--it is healthier for the baby to be born early. Your treatment will depend on how far along you are in your pregnancy and on your health and your baby's health.  Follow-up care is a key part of your treatment and safety. Be sure to make and go to all appointments, and call your doctor if you are having problems. It's also a good idea to know your test results and keep a list of the medicines you take.  How can you care for yourself at home?  If your doctor prescribed medicines, take them exactly as directed. Call your doctor if you think you are having a problem with your medicine.  Rest until your doctor advises you about activity.  Do not have sexual intercourse unless your doctor says it is safe.  Use sanitary pads if you have vaginal bleeding. Using pads makes it easier to monitor your bleeding.  Do not smoke or allow others to smoke around you. If you need help quitting, talk to your doctor about stop-smoking programs and medicines. These can increase your chances of quitting for good.  When should you call for help?   Call 911  anytime you think you may need emergency care. For example, call if:    You passed out (lost consciousness).     You have a seizure.     You have severe vaginal bleeding.     You have severe pain in your belly or pelvis that doesn't get better between contractions.     You have had fluid gushing or leaking from your vagina and you know or think the umbilical cord is bulging into your vagina. If this happens, immediately get down on your knees so your rear end (buttocks) is higher than your head. This will decrease the pressure on the cord until help arrives.   Call your doctor now or seek immediate medical care if:    You have  "signs of preeclampsia, such as:  Sudden swelling of your face, hands, or feet.  New vision problems (such as dimness, blurring, or seeing spots).  A severe headache.     You have any vaginal bleeding.     You have belly pain or cramping.     You have a fever.     You have had regular contractions (with or without pain) for an hour. This means that you have 6 or more within 1 hour after you change your position and drink fluids.     You have a sudden release of fluid from the vagina.     You have low back pain or pelvic pressure that does not go away.     You notice that your baby has stopped moving or is moving much less than normal.   Watch closely for changes in your health, and be sure to contact your doctor if you have any problems.  Where can you learn more?  Go to https://www.[employer].net/patiented  Enter Q400 in the search box to learn more about \" Labor: Care Instructions.\"  Current as of: 2024  Content Version: 14.3    2024 [employer].   Care instructions adapted under license by your healthcare professional. If you have questions about a medical condition or this instruction, always ask your healthcare professional. [employer] disclaims any warranty or liability for your use of this information.    "

## 2025-02-12 ENCOUNTER — PRENATAL OFFICE VISIT (OUTPATIENT)
Dept: OBGYN | Facility: CLINIC | Age: 35
End: 2025-02-12
Payer: MEDICAID

## 2025-02-12 VITALS
SYSTOLIC BLOOD PRESSURE: 110 MMHG | TEMPERATURE: 98.7 F | WEIGHT: 212.2 LBS | HEIGHT: 61 IN | BODY MASS INDEX: 40.06 KG/M2 | RESPIRATION RATE: 18 BRPM | HEART RATE: 101 BPM | DIASTOLIC BLOOD PRESSURE: 69 MMHG

## 2025-02-12 DIAGNOSIS — O99.012 ANEMIA AFFECTING PREGNANCY IN SECOND TRIMESTER: Primary | ICD-10-CM

## 2025-02-12 DIAGNOSIS — O99.810 ABNORMAL MATERNAL GLUCOSE TOLERANCE, ANTEPARTUM: ICD-10-CM

## 2025-02-12 DIAGNOSIS — O09.522 MULTIGRAVIDA OF ADVANCED MATERNAL AGE IN SECOND TRIMESTER: ICD-10-CM

## 2025-02-12 DIAGNOSIS — E66.812 OBESITY, CLASS II, BMI 35-39.9: ICD-10-CM

## 2025-02-12 DIAGNOSIS — Z98.891 HISTORY OF CESAREAN SECTION: ICD-10-CM

## 2025-02-12 DIAGNOSIS — Z34.82 PRENATAL CARE, SUBSEQUENT PREGNANCY IN SECOND TRIMESTER: Primary | ICD-10-CM

## 2025-02-12 DIAGNOSIS — Z86.32 HISTORY OF GESTATIONAL DIABETES: ICD-10-CM

## 2025-02-12 PROBLEM — Z30.430 ENCOUNTER FOR INSERTION OF MIRENA IUD: Status: RESOLVED | Noted: 2024-02-14 | Resolved: 2025-02-12

## 2025-02-12 LAB
ERYTHROCYTE [DISTWIDTH] IN BLOOD BY AUTOMATED COUNT: 13.8 % (ref 10–15)
GLUCOSE 1H P 50 G GLC PO SERPL-MCNC: 134 MG/DL (ref 70–129)
HCT VFR BLD AUTO: 31.9 % (ref 35–47)
HGB BLD-MCNC: 10.7 G/DL (ref 11.7–15.7)
MCH RBC QN AUTO: 31.1 PG (ref 26.5–33)
MCHC RBC AUTO-ENTMCNC: 33.5 G/DL (ref 31.5–36.5)
MCV RBC AUTO: 93 FL (ref 78–100)
PLATELET # BLD AUTO: 293 10E3/UL (ref 150–450)
RBC # BLD AUTO: 3.44 10E6/UL (ref 3.8–5.2)
WBC # BLD AUTO: 7.9 10E3/UL (ref 4–11)

## 2025-02-12 PROCEDURE — 36415 COLL VENOUS BLD VENIPUNCTURE: CPT | Performed by: OBSTETRICS & GYNECOLOGY

## 2025-02-12 PROCEDURE — 82950 GLUCOSE TEST: CPT | Performed by: OBSTETRICS & GYNECOLOGY

## 2025-02-12 PROCEDURE — 99207 PR PRENATAL VISIT: CPT | Performed by: OBSTETRICS & GYNECOLOGY

## 2025-02-12 PROCEDURE — 86780 TREPONEMA PALLIDUM: CPT | Performed by: OBSTETRICS & GYNECOLOGY

## 2025-02-12 PROCEDURE — 85027 COMPLETE CBC AUTOMATED: CPT | Performed by: OBSTETRICS & GYNECOLOGY

## 2025-02-12 NOTE — PROGRESS NOTES
"[facility]    Return OB Note    CC: Return OB     Subjective:  Asmita is a 35 year old  at 27w5d   Denies vaginal bleeding, loss of fluid, or regular contractions. Good fetal movement.  Complaints today: increased thirst, drinking a lot of water. Having increase in heartburn. Taking Omeprazole in the AM, symptoms more overnight, will try taking before dinner.    Objective:  /69 (BP Location: Left arm, Patient Position: Sitting, Cuff Size: Adult Large)   Pulse 101   Temp 98.7  F (37.1  C) (Tympanic)   Resp 18   Ht 1.549 m (5' 1\")   Wt 96.3 kg (212 lb 3.2 oz)   LMP 2024   BMI 40.09 kg/m      Fundal height: 32cm  FHT: 135bpm    Assessment/Plan:   Encounter Diagnoses   Name Primary?    Prenatal care, subsequent pregnancy in second trimester Yes    History of  section     History of gestational diabetes     Multigravida of advanced maternal age in second trimester     Obesity, Class II, BMI 35-39.9        IUP at 27w5d  -NOB labs WNL  -Prenatal screening: declines  -Anatomy US: L2 WNL  -Mid trimester labs today    Co-Morbidities/Complications/Concerns:   -S>D: growth US ordered   -Hx GDMA2: normal baseline Hba1c. routine screening today  -Hx C section x3: plan C section for delivery  -AMA: S/p L2  -Obesity: BMI 38.Plan growth US at 32 weeks, BPPs at 37 weeks.   -2 risk factors for pre-e: recommend ASA   -Strict return precautions given    RTC 2 weeks    Melodie Sesay DO    "

## 2025-02-12 NOTE — NURSING NOTE
"Initial /69 (BP Location: Left arm, Patient Position: Sitting, Cuff Size: Adult Large)   Pulse 101   Temp 98.7  F (37.1  C) (Tympanic)   Resp 18   Ht 1.549 m (5' 1\")   Wt 96.3 kg (212 lb 3.2 oz)   LMP 07/16/2024   BMI 40.09 kg/m   Estimated body mass index is 40.09 kg/m  as calculated from the following:    Height as of this encounter: 1.549 m (5' 1\").    Weight as of this encounter: 96.3 kg (212 lb 3.2 oz). .    "

## 2025-02-12 NOTE — PATIENT INSTRUCTIONS
Learning About Screening for Gestational Diabetes  What is gestational diabetes screening?     Screening for gestational diabetes is a way to look for high blood sugar during pregnancy. You drink some very sweet liquid. Then you have a blood test to see how your body uses sugar (glucose).  How is gestational diabetes screening done?  Screening for gestational diabetes may be done in a couple of ways.  Two-part screening.  Part one (glucose challenge test): A blood sample is taken after you drink a liquid that contains sugar (glucose). You don't need to stop eating or drinking before this test. If the test shows that you don't have a lot of sugar in your blood, you don't have gestational diabetes.  Part two (oral glucose tolerance test, or OGTT): If the first test shows a lot of sugar in your blood, then you may have an OGTT. You can't eat or drink for at least 8 hours before this test. A blood sample is taken, then you drink a sweet liquid. You have more blood tests after 1 to 3 hours. If the OGTT shows that you have a lot of sugar in your blood, you may have gestational diabetes.  One-part screening.  Sometimes doctors use the OGTT on its own. If the test shows that you don't have a lot of sugar in your blood, you don't have gestational diabetes. If you do have a lot of sugar in your blood, you may have the condition.  What are the risks of screening?  Your blood glucose level may drop very low toward the end of the test. If this happens, you may feel weak, hungry, and restless. Tell your doctor if you have these symptoms. The test usually will be stopped.  You may vomit after drinking the sweet liquid. If this happens, you may need to take the test at a later time.  Your doctor may do more glucose tests at other times during your pregnancy.  Follow-up care is a key part of your treatment and safety. Be sure to make and go to all appointments, and call your doctor if you are having problems. It's also a good idea  "to know your test results and keep a list of the medicines you take.  Where can you learn more?  Go to https://www.[employer].net/patiented  Enter A472 in the search box to learn more about \"Learning About Screening for Gestational Diabetes.\"  Current as of: 2024  Content Version: 14.3    2024 [place].   Care instructions adapted under license by your healthcare professional. If you have questions about a medical condition or this instruction, always ask your healthcare professional. [place] c[employer] disclaims any warranty or liability for your use of this information.    You have been provided the My Labor and Birth Wishes document.  Please review at home and bring to your next prenatal visit. Bring this sheet to the hospital for your birth. Give copies to your care team members and support person.   Additional copies can be found here:  [email or web address]  Weeks 26 to 30 of Your Pregnancy: Care Instructions  You're starting your last trimester. You'll probably feel your baby moving around more. Your back may ache as your body gets used to your baby's size and length. Take care of yourself, and pay attention to what your body needs.    Talk to your doctor about getting the Tdap shot. It will help protect your  against whooping cough (pertussis). Also ask your doctor about flu and COVID-19 shots if you haven't had them yet. If your blood type is Rh negative, you may be given a shot of Rh immune globulin (such as RhoGAM). It can help prevent problems for your baby.   You may have [place] contractions. They are single or several strong contractions without a pattern. These are practice contractions but not the start of labor.   Be kind to yourself.       Take breaks when you're tired.  Change positions often. Don't sit for too long or stand for too long.  At work, rest during breaks if you can. If you don't get breaks, talk to your doctor about writing a letter to " "your employer to request them.  Avoid fumes, chemicals, and tobacco smoke.  Be sexual if you want to.       You may be interested in sex, or you may not. Everyone is different.  Sex is okay unless your doctor tells you not to.  Your belly can make it hard to find good positions for sex. Lapel and explore.  Watch for signs of  labor.        These signs include:  Menstrual-like cramps. Or you may have pain or pressure in your pelvis that happens in a pattern.  About 6 or more contractions in an hour (even after rest and a glass of water).  A low, dull backache that doesn't go away when you change positions.  An increase or change in vaginal discharge.  Light vaginal bleeding or spotting.  Your water breaking.  Know what to do if you think you are having contractions.       Drink 1 or 2 glasses of water.  Lie down on your left side for at least an hour.  While on your side, feel the top of your belly to see if it's tight.  Write down your contractions for an hour. Time how long it is from the start of one contraction to the start of the next.  Call your doctor if you have regular contractions.  Follow-up care is a key part of your treatment and safety. Be sure to make and go to all appointments, and call your doctor if you are having problems. It's also a good idea to know your test results and keep a list of the medicines you take.  Where can you learn more?  Go to https://www.[email or web address]net/patiented  Enter S999 in the search box to learn more about \"Weeks 26 to 30 of Your Pregnancy: Care Instructions.\"  Current as of: 2024  Content Version: 14.3    2024 [employer].   Care instructions adapted under license by your healthcare professional. If you have questions about a medical condition or this instruction, always ask your healthcare professional. [employer] disclaims any warranty or liability for your use of this information.    Counting Your Baby's Kicks: Care " "Instructions  Overview     Counting your baby's kicks is one way your doctor can tell that your baby is healthy. You will probably feel your baby move for the first time between 16 and 22 weeks. The movement may feel like flutters rather than kicks. Your baby may move more at certain times of the day. When you are active, you may notice less kicking than when you are resting. At your prenatal visits, your doctor will ask whether the baby is active.  In your last trimester, your doctor may ask you to count the number of times you feel your baby move.  Follow-up care is a key part of your treatment and safety. Be sure to make and go to all appointments, and call your doctor if you are having problems. It's also a good idea to know your test results and keep a list of the medicines you take.  How do you count fetal kicks?  A common method of checking your baby's movement is to note the length of time it takes to count 10 movements (such as kicks, flutters, or rolls).  Pick your baby's most active time of day to count. This may be any time from morning to evening.  If you don't feel 10 movements in an hour, have something to eat or drink and count for another hour. If you don't feel at least 10 movements in the 2-hour period, call your doctor.  Do not use an at-home Doppler heart monitor in place of counting fetal movements.  When should you call for help?   Call your doctor now or seek immediate medical care if:    You feel fewer than 10 movements in a 2-hour period.     You noticed that your baby has stopped moving or is moving less than normal.   Watch closely for changes in your health, and be sure to contact your doctor if you have any problems.  Where can you learn more?  Go to https://www.[employer].net/patiented  Enter U048 in the search box to learn more about \"Counting Your Baby's Kicks: Care Instructions.\"  Current as of: April 30, 2024  Content Version: 14.3    2024 [facility].   Care " instructions adapted under license by your healthcare professional. If you have questions about a medical condition or this instruction, always ask your healthcare professional. [employer] disclaims any warranty or liability for your use of this information.

## 2025-02-12 NOTE — PROGRESS NOTES
"Patient completed the 1 hour glucose drink 1358 at 1458 . Instructed patient to check in at the lab immediately after their provider visit.  Blood draw needs to be completed exactly one hour after finishing the drink.  Lab staff informed of this time through the router slip.      Patient was given QR code to scan and watch \"Anesthetic options for pain relief during labor\" video.     Patient in agreement with plan.    Don Parrish MA on 2/12/2025 at 3:58 PM      "

## 2025-02-13 LAB — T PALLIDUM AB SER QL: NONREACTIVE

## 2025-02-15 DIAGNOSIS — O24.410 DIET CONTROLLED GESTATIONAL DIABETES MELLITUS (GDM) IN THIRD TRIMESTER: ICD-10-CM

## 2025-02-15 DIAGNOSIS — O99.019 IRON DEFICIENCY ANEMIA DURING PREGNANCY: Primary | ICD-10-CM

## 2025-02-15 DIAGNOSIS — D50.9 IRON DEFICIENCY ANEMIA DURING PREGNANCY: Primary | ICD-10-CM

## 2025-02-15 RX ORDER — MULTIVIT WITH MINERALS/LUTEIN
250 TABLET ORAL DAILY
Qty: 90 TABLET | Refills: 2 | Status: SHIPPED | OUTPATIENT
Start: 2025-02-15

## 2025-02-15 RX ORDER — LANOLIN ALCOHOL/MO/W.PET/CERES
1000 CREAM (GRAM) TOPICAL DAILY
Qty: 90 TABLET | Refills: 2 | Status: SHIPPED | OUTPATIENT
Start: 2025-02-15

## 2025-02-15 RX ORDER — FERROUS SULFATE 325(65) MG
325 TABLET ORAL
Qty: 90 TABLET | Refills: 2 | Status: SHIPPED | OUTPATIENT
Start: 2025-02-15

## 2025-02-17 ENCOUNTER — APPOINTMENT (OUTPATIENT)
Dept: INTERPRETER SERVICES | Facility: CLINIC | Age: 35
End: 2025-02-17
Payer: MEDICAID

## 2025-02-19 ENCOUNTER — ALLIED HEALTH/NURSE VISIT (OUTPATIENT)
Dept: EDUCATION SERVICES | Facility: CLINIC | Age: 35
End: 2025-02-19
Attending: OBSTETRICS & GYNECOLOGY
Payer: MEDICAID

## 2025-02-19 ENCOUNTER — TELEPHONE (OUTPATIENT)
Dept: NUTRITION | Facility: CLINIC | Age: 35
End: 2025-02-19

## 2025-02-19 DIAGNOSIS — O24.410 DIET CONTROLLED GESTATIONAL DIABETES MELLITUS (GDM) IN THIRD TRIMESTER: ICD-10-CM

## 2025-02-19 PROCEDURE — G0108 DIAB MANAGE TRN  PER INDIV: HCPCS | Performed by: DIETITIAN, REGISTERED

## 2025-02-19 RX ORDER — LANCETS 33 GAUGE
1 EACH MISCELLANEOUS 4 TIMES DAILY
Qty: 150 EACH | Refills: 4 | Status: SHIPPED | OUTPATIENT
Start: 2025-02-19 | End: 2025-02-20

## 2025-02-19 RX ORDER — LANCETS 33 GAUGE
1 EACH MISCELLANEOUS 4 TIMES DAILY
Qty: 150 EACH | Refills: 4 | Status: SHIPPED | OUTPATIENT
Start: 2025-02-19 | End: 2025-02-19

## 2025-02-19 NOTE — PROGRESS NOTES
Diabetes Self-Management Education & Support    Type of Service: In Person Visit   ID: [id number]    Assessment  Patient presents today alone.  Previous diagnosis of GDM, insulin controlled. Last time was all virtual visits and she wanted inperson as she feels she learns better this way.  Still has written resources from last time and remembered a lot of the information.  She still has glucose meter but needed prescription for supplies and ketone strips.  Prescriptions sent.  She had some dietary questions so we reviewed carbohydrate counting and the meal plan.  She feels better about managing GDM just having an inperson visit.      Intervention  Patient has One Touch Ultra 2 meter at home from previous pregnancy and has no questions about use of it.      Educational topics covered today:  GDM diagnosis, pathophysiology, means of controlling GDM, using a blood glucose monitor, blood glucose goals, logging and interpreting glucose results, ketone testing, when to call a diabetes educator or OB provider, healthy eating during pregnancy, counting carbohydrates, meal planning for GDM, and physical activity    Educational materials provided today:   [place] Understanding Gestational Diabetes  GDM Log Book  Sharps Disposal  One Touch Ultra 2 meter kit    Plan  Check glucose 4 times daily, before breakfast and 1 hour after each meal.     Check Ketones daily for one week, if negative, reduce testing to once a week.     Physical activity recommended: as able.    Meal plan: 30-45g carbohydrates at breakfast, 45-60g carbohydrates at lunch, 45-60g carbohydrates at supper, 15-30g carbohydrates at 3 snacks a day.  Follow consistent carbohydrate meal plan, eat carbohydrates and protein/fat at all meals/snacks.    Call/MyChart message diabetes educator if 3 or more blood sugars are above the goal in 1 week, if ketones are positive, or with questions/concerns.    Follow-up:    Follow up on Upcoming Diabetes Ed Appointments      Visit Type Date Time Department    GDM ED 2/19/2025  9:00 AM [facility] ED    GDM FOLLOW UP 2/26/2025  1:00 PM [facility] ED        Subjective/Objective  Asmita is an 35 year old year old, presenting for the following diabetes education related to:      Accompanied by: Self  Diabetes management related comments/concerns: glad to have inperson visit  Gestational weeks: 28w5d  Hospital planned for delivery: [place]  Next OB Visit Date: 03/06/25  Number of previous pregnancies: 3  Had any babies over 9 lbs: No  Previously had Gestational Diabetes: Yes  Had Diabetes Education before: Yes  Previous insulin or other diabetes medication during that pregnancy: Yes  Have you ever had thyroid problems or taken thyroid medication?: No  Heart disease, mitral valve prolapse or rheumatic fever?: No  Hypertension : No  High Cholesterol: Yes  High Triglycerides: No  Do you use tobacco products?: No  Do you drink beer, wine or hard liquor?: No    Cultural Influences/Ethnic Background:   or     Estimated Date of Delivery: May 9, 2025    1 hour OGTT  Lab Results   Component Value Date    GLU1 134 (H) 02/12/2025         3 hour OGTT    Fasting  Lab Results   Component Value Date    GTTGF 107 (H) 02/14/2025       1 hour  Lab Results   Component Value Date    GTTG1 196 (H) 02/14/2025       2 hour  Lab Results   Component Value Date    GTTG2 146 02/14/2025       3 hour  Lab Results   Component Value Date    GTTG3 130 02/14/2025       Healthy Eating:  Exercise:: Yes (walking alot at home)  Barrier to exercise: None  Cultural/Confucianism diet restrictions?: No  Meal planning/habits: None  How many times a week on average do you eat food made away from home (restaurant/take-out)?: 1  Meals include: Breakfast, Lunch, Dinner  Breakfast: eggs, bread (grain bread), water or coffee or tea (no added sugar)  Lunch: 1 cup pasta salome, water  Dinner: 7:30-8pm:  chicken soup w/ vegetables, 4 tortillas w/ avocado, [place] water (its  a plant that makes water red)  Snacks: AM: celery or fruit or a special cookie, PM: same as morning, HS:  nothing  Beverages: Water, Tea, Sports drinks  Biggest challenges to healthy eating: Portion control  Pre- vitamin?: Yes  Supplements?: No  Experiencing nausea?: Yes  Experiencing heartburn?: Yes    Healthy Coping:  Emotional response to diabetes: Acceptance  Informal Support system:: Spouse  Stage of change: ACTION (Actively working towards change)    Current Management:  Taking medications for gestational diabetes?: No    KAYLA SHAH  Time Spent: 60 minutes  Encounter Type: Individual     Any diabetes medication dose changes were made via the CDCES Standing Orders under the patient's referring provider.

## 2025-02-19 NOTE — TELEPHONE ENCOUNTER
[facility] Call Center    Phone Message    May a detailed message be left on voicemail: yes     Reason for Call: Medication Question or concern regarding medication   Prescription Clarification  Name of Medication:   blood glucose (NO BRAND SPECIFIED) test strip  and  OneTouch Delica Lancets 33G MISC  and  acetone urine (KETOSTIX) test strip  Prescribing Provider: Melodie Sesay DO    Pharmacy: [address]    What on the order needs clarification?     Please send the RX to the pharmacy : [facility] [address] .       Action Taken: Message routed to:  [facility] KYLAH EVANS    Travel Screening: Not Applicable     Date of Service:

## 2025-02-19 NOTE — LETTER
2/19/2025         RE: Asmita Frye  [address] [facility] [place] [address]        Dear Colleague,    Thank you for referring your patient, Asmita Frye, to the [facility]. Please see a copy of my visit note below.    Diabetes Self-Management Education & Support    Type of Service: In Person Visit   ID: [id number]    Assessment  Patient presents today alone.  Previous diagnosis of GDM, insulin controlled. Last time was all virtual visits and she wanted inperson as she feels she learns better this way.  Still has written resources from last time and remembered a lot of the information.  She still has glucose meter but needed prescription for supplies and ketone strips.  Prescriptions sent.  She had some dietary questions so we reviewed carbohydrate counting and the meal plan.  She feels better about managing GDM just having an inperson visit.      Intervention  Patient has One Touch Ultra 2 meter at home from previous pregnancy and has no questions about use of it.      Educational topics covered today:  GDM diagnosis, pathophysiology, means of controlling GDM, using a blood glucose monitor, blood glucose goals, logging and interpreting glucose results, ketone testing, when to call a diabetes educator or OB provider, healthy eating during pregnancy, counting carbohydrates, meal planning for GDM, and physical activity    Educational materials provided today:   [place] Understanding Gestational Diabetes  GDM Log Book  Sharps Disposal  One Touch Ultra 2 meter kit    Plan  Check glucose 4 times daily, before breakfast and 1 hour after each meal.     Check Ketones daily for one week, if negative, reduce testing to once a week.     Physical activity recommended: as able.    Meal plan: 30-45g carbohydrates at breakfast, 45-60g carbohydrates at lunch, 45-60g carbohydrates at supper, 15-30g carbohydrates at 3 snacks a day.  Follow consistent carbohydrate meal plan, eat  carbohydrates and protein/fat at all meals/snacks.    Call/MyChart message diabetes educator if 3 or more blood sugars are above the goal in 1 week, if ketones are positive, or with questions/concerns.    Follow-up:    Follow up on Upcoming Diabetes Ed Appointments     Visit Type Date Time Department    GDM ED 2/19/2025  9:00 AM [facility] ED    GDM FOLLOW UP 2/26/2025  1:00 PM [facility] DIABETES ED        Subjective/Objective  Asmita is an 35 year old year old, presenting for the following diabetes education related to:      Accompanied by: Self  Diabetes management related comments/concerns: zak to have inperson visit  Gestational weeks: 28w5d  Hospital planned for delivery: [place]  Next OB Visit Date: 03/06/25  Number of previous pregnancies: 3  Had any babies over 9 lbs: No  Previously had Gestational Diabetes: Yes  Had Diabetes Education before: Yes  Previous insulin or other diabetes medication during that pregnancy: Yes  Have you ever had thyroid problems or taken thyroid medication?: No  Heart disease, mitral valve prolapse or rheumatic fever?: No  Hypertension : No  High Cholesterol: Yes  High Triglycerides: No  Do you use tobacco products?: No  Do you drink beer, wine or hard liquor?: No    Cultural Influences/Ethnic Background:   or     Estimated Date of Delivery: May 9, 2025    1 hour OGTT  Lab Results   Component Value Date    GLU1 134 (H) 02/12/2025         3 hour OGTT    Fasting  Lab Results   Component Value Date    GTTGF 107 (H) 02/14/2025       1 hour  Lab Results   Component Value Date    GTTG1 196 (H) 02/14/2025       2 hour  Lab Results   Component Value Date    GTTG2 146 02/14/2025       3 hour  Lab Results   Component Value Date    GTTG3 130 02/14/2025       Healthy Eating:  Exercise:: Yes (walking alot at home)  Barrier to exercise: None  Cultural/Orthodoxy diet restrictions?: No  Meal planning/habits: None  How many times a week on average do you eat food made away from home  (restaurant/take-out)?: 1  Meals include: Breakfast, Lunch, Dinner  Breakfast: eggs, bread (grain bread), water or coffee or tea (no added sugar)  Lunch: 1 cup pasta salome, water  Dinner: 7:30-8pm:  chicken soup w/ vegetables, 4 tortillas w/ avocado, [place] water (its a plant that makes water red)  Snacks: AM: celery or fruit or a special cookie, PM: same as morning, HS:  nothing  Beverages: Water, Tea, Sports drinks  Biggest challenges to healthy eating: Portion control  Pre- vitamin?: Yes  Supplements?: No  Experiencing nausea?: Yes  Experiencing heartburn?: Yes    Healthy Coping:  Emotional response to diabetes: Acceptance  Informal Support system:: Spouse  Stage of change: ACTION (Actively working towards change)    Current Management:  Taking medications for gestational diabetes?: No    KAYLA SHAH  Time Spent: 60 minutes  Encounter Type: Individual     Any diabetes medication dose changes were made via the CDCES Standing Orders under the patient's referring provider.

## 2025-02-20 RX ORDER — LANCETS 33 GAUGE
1 EACH MISCELLANEOUS 4 TIMES DAILY
Qty: 150 EACH | Refills: 4 | Status: SHIPPED | OUTPATIENT
Start: 2025-02-20

## 2025-02-20 NOTE — TELEPHONE ENCOUNTER
Sent rx requests to preferred pharmacy. Sent [employer] message to inform patient.     Michelle Galvan, TARYN, SIENAN, LD, [facility]  Diabetes     [phone]  Diabetes Education Care Team: [phone]

## 2025-02-26 ENCOUNTER — VIRTUAL VISIT (OUTPATIENT)
Dept: EDUCATION SERVICES | Facility: CLINIC | Age: 35
End: 2025-02-26
Payer: MEDICAID

## 2025-02-26 DIAGNOSIS — O24.414 GESTATIONAL DIABETES MELLITUS (GDM) REQUIRING INSULIN: Primary | ICD-10-CM

## 2025-02-26 PROCEDURE — G0108 DIAB MANAGE TRN  PER INDIV: HCPCS | Mod: 95 | Performed by: DIETITIAN, REGISTERED

## 2025-02-26 RX ORDER — BLOOD SUGAR DIAGNOSTIC
STRIP MISCELLANEOUS
Qty: 100 EACH | Refills: 1 | Status: SHIPPED | OUTPATIENT
Start: 2025-02-26

## 2025-02-26 NOTE — PATIENT INSTRUCTIONS
Plan      Inject 9 units Novolin NPH insulin at bedtime.    2.  Check glucose four times daily, before breakfast and 1 hour after each meal.  3.  Check ketones once a week when readings are consistently negative.  4.  Continue with recommended physical activity.  5.  Continue to follow recommended meal plan: 30-45g carbohydratess at breakfast, 45-60g carbohydratess at lunch, 45-60g carbohydrates at supper, 15-30g carbohydrates at snacks.  6.  Follow up with Diabetes Education 3/6/25 at 9am (virtual visit).  If you have questions you can send them through D4P or call Diabetes Education Triage [phone].  For Scheduling call [phone].

## 2025-02-26 NOTE — LETTER
2/26/2025         RE: Asmita Frye  [address] [facility] [address]        Dear Colleague,    Thank you for referring your patient, Asmita Frye, to the [facility]. Please see a copy of my visit note below.    Diabetes Self-Management Education & Support    Type of service:  Video Visit    If the video visit is dropped, the video visit invitation should be resent by: Text to cell phone: [phone]    Originating Location (pt. Location): Home  Distant Location (provider location): [facility]  Mode of Communication:  Video Conference via Calm    Video Start Time:  1:01pm  Video End Time (time video stopped): 1:56pm   ID: [id number]    How would patient like to obtain AVS? MyChart      Assessment  Ketones: neg.   Fasting blood glucoses: 33% in target.  After breakfast: 83% in target.  After lunch: 100% in target.  After dinner: 100% in target.  Recent weight:  96.3 kg    Based on reported BG recommended starting insulin today.  Pt had used insulin in previous pregnancy but does not remember how to inject.  Instructed patient on use of vial and syringe.  Discussed: disposable of sharps ([facility] in [place]).  Pt expressed understanding.      Intervention  Educational topics covered today:  What to expect after delivery, future testing for Type 2 diabetes (2 hour OGTT at 6 week post-partum check-up and annual fasting blood glucose level), risk of GDM and planning ahead for future pregnancies, recommended lifestyle interventions for reducing the risk of Type 2 Diabetes, when to call a Diabetes Educator or OB provider    Educational Materials provided today:  [facility] Diabetes    Patient verbalized understanding of diabetes self-management education concepts discussed, opportunities for ongoing education and support, and recommendations provided today    Plan  Inject 9 units Novolin NPH  "insulin at bedtime.    Check glucose four times daily, before breakfast and 1 hour after each meal.  Check ketones once a week when readings are consistently negative.  Continue with recommended physical activity.  Continue to follow recommended meal plan: 30-45g carbohydratess at breakfast, 45-60g carbohydratess at lunch, 45-60g carbohydrates at supper, 15-30g carbohydrates at snacks.  Follow consistent carbohydrate meal plan, eat carbohydrates and protein/fat at all meals/snacks.    Send in Glucose Log (blood sugars) via [employer] in {Blood sugar lo::\"7 days\"}. If 3 or more blood sugars are above the goal at a given time, or if Ketones are small, moderate or large, call or [employer] message the diabetes educator.    Follow-up:    Follow up on Upcoming Diabetes Ed Appointments     Visit Type Date Time Department    GDM FOLLOW UP 2025  1:00 PM  DIABETES ED        Subjective/Objective  Asmita is an 35 year old year old, presenting for the following diabetes education related to:           Cultural Influences/Ethnic Background:   or       LMP 2024       Estimated Date of Delivery: May 9, 2025    Blood Glucose/Ketone Log:   Date Ketones Fasting Post Breakfast Post Lunch Post Supper    neg 94 120      neg 98 143 119 106    neg 104 114 106 125    neg 96 119 129 129    neg 101 139 131 100    neg 94 125 109 129   Just got the meter late on 24.      Healthy Eating:       Healthy Coping:       Current Management:       KAYLA SHAH  Time Spent: {:723730} minutes  Encounter Type: Individual     Diabetes medication dose changes were made via the CDCES Standing Orders under the patient's referring provider.    Diabetes Self-Management Education & Support    Type of service:  Video Visit    If the video visit is dropped, the video visit invitation should be resent by: Text to cell phone: [phone]    Originating Location (pt. Location): Home  Distant Location " (provider location): [facility]  Mode of Communication:  Video Conference via [employer]    Video Start Time:  1:01pm  Video End Time (time video stopped): 1:56pm   ID: [id number]    How would patient like to obtain AVS? S[employer]atohart    Assessment  Ketones: neg.   Fasting blood glucoses: 33% in target.  After breakfast: 83% in target.  After lunch: 100% in target.  After dinner: 100% in target.  Recent weight:  96.3 kg (starting insulin 0.1 units/kg = 9-10 units/day)    Based on reported BG recommended starting insulin today.  She does feel that if she has a bedtime snack, fasting BG are lower.  She will continue to work on this.  Pt had used insulin in previous pregnancy.  She remembers using a pen.  Insurance not covering the pen.    Instructed on use of vial and syringe. She also had questions about disposing of sharps.  Information given for   [facility] in [place].  Pt expressed understanding.  Due to use of  will schedule her for virtual visit, but she said she might be able to send in BG readings via [employer].      Intervention  Educational topics covered today:  What to expect after delivery, future testing for Type 2 diabetes (2 hour OGTT at 6 week post-partum check-up and annual fasting blood glucose level), risk of GDM and planning ahead for future pregnancies, recommended lifestyle interventions for reducing the risk of Type 2 Diabetes, when to call a Diabetes Educator or OB provider    Educational Materials provided today:  [facility] Diabetes    Patient verbalized understanding of diabetes self-management education concepts discussed, opportunities for ongoing education and support, and recommendations provided today    Plan       Inject 9 units Novolin NPH insulin at bedtime.    2.  Check glucose four times daily, before breakfast and 1 hour after each meal.  3.  Check ketones once a week when readings are consistently  negative.  4.  Continue with recommended physical activity.  5.  Continue to follow recommended meal plan: 30-45g carbohydratess at breakfast, 45-60g carbohydratess at lunch, 45-60g carbohydrates at supper, 15-30g carbohydrates at snacks.  6.  Follow up with Diabetes Education 3/6/25 at 9am (virtual visit).  If you have questions you can send them through [employer] or call Diabetes Education Triage [phone].  For Scheduling call [phone].    Follow-up:    Follow up on Upcoming Diabetes Ed Appointments     Visit Type Date Time Department    GDM FOLLOW UP 2/26/2025  1:00 PM CL DIABETES ED    GDM ED SHORT 3/6/2025  9:00 AM FZ DIABETES ED      Subjective/Objective  Asmita is an 35 year old year old, presenting for the following diabetes education related to:      Accompanied by: Self  Diabetes management related comments/concerns: glad to have inperson visit  Gestational weeks: 29w5d  Hospital planned for delivery: 2/26  Next OB Visit Date: 03/06/25  Number of previous pregnancies: 3  Had any babies over 9 lbs: No  Previously had Gestational Diabetes: Yes  Had Diabetes Education before: Yes  Previous insulin or other diabetes medication during that pregnancy: Yes  Have you ever had thyroid problems or taken thyroid medication?: No  Heart disease, mitral valve prolapse or rheumatic fever?: No  Hypertension : No  High Cholesterol: Yes  High Triglycerides: No  Do you use tobacco products?: No  Do you drink beer, wine or hard liquor?: No    Cultural Influences/Ethnic Background:   or       LMP 07/16/2024         Estimated Date of Delivery: May 9, 2025    Blood Glucose/Ketone Log:     Blood Glucose/Ketone Log:   Date Ketones Fasting Post Breakfast Post Lunch Post Supper   2/26 neg 94 120     2/25 neg 98 143 119 106   2/24 neg 104 114 106 125   2/23 neg 96 119 129 129   2/22 neg 101 139 131 100   2/21 neg 94 125 109 129   Just got the meter late on 2/20/24.        Healthy Eating:  Exercise:: Yes (walking  alot at home)  Barrier to exercise: None  Cultural/Advent diet restrictions?: No  Meal planning/habits: None  How many times a week on average do you eat food made away from home (restaurant/take-out)?: 1  Meals include: Breakfast, Lunch, Dinner, Evening Snack  Breakfast: quessadillas w/ cheese and cream, coffee (no added sugar)  Lunch: 1 cup pasta salome, water  Dinner: 7:30-8pm:  beans w/ spinach, cream & cheese, pineapple water  Snacks: PM: 1/2 apple , HS: 1/2 banana or cucumber, last night 1.5 egg  Beverages: Water, Tea, Sports drinks  Biggest challenges to healthy eating: Portion control  Pre-lanette vitamin?: Yes  Supplements?: No  Experiencing nausea?: Yes  Experiencing heartburn?: Yes    Healthy Coping:  Emotional response to diabetes: Acceptance  Informal Support system:: Spouse  Stage of change: ACTION (Actively working towards change)    Current Management:  Taking medications for gestational diabetes?: No    KAYLA SHAH  Time Spent: 55 minutes  Encounter Type: Individual     Diabetes medication dose changes were made via the CDCES Standing Orders under the patient's referring provider.

## 2025-02-26 NOTE — LETTER
2/26/2025         RE: Asmita Frye  [address] [facility] [address]        Dear Colleague,    Thank you for referring your patient, Asmita Frye, to the [facility]. Please see a copy of my visit note below.    Diabetes Self-Management Education & Support    Type of service:  Video Visit    If the video visit is dropped, the video visit invitation should be resent by: Text to cell phone: [phone]    Originating Location (pt. Location): Home  Distant Location (provider location): [facility]  Mode of Communication:  Video Conference via [employer]    Video Start Time:  1:01pm  Video End Time (time video stopped): 1:56pm   ID: [id number]    How would patient like to obtain AVS? MyChart    Assessment  Ketones: neg.   Fasting blood glucoses: 33% in target.  After breakfast: 83% in target.  After lunch: 100% in target.  After dinner: 100% in target.  Recent weight:  96.3 kg (starting insulin 0.1 units/kg = 9-10 units/day)    Based on reported BG recommended starting insulin today.  She does feel that if she has a bedtime snack, fasting BG are lower.  She will continue to work on this.  Pt had used insulin in previous pregnancy.  She remembers using a pen.  Insurance not covering the pen.    Instructed on use of vial and syringe. She also had questions about disposing of sharps.  Information given for   [facility] in [place].  Pt expressed understanding.  Due to use of  will schedule her for virtual visit, but she said she might be able to send in BG readings via [employer].      Intervention  Educational topics covered today:  What to expect after delivery, future testing for Type 2 diabetes (2 hour OGTT at 6 week post-partum check-up and annual fasting blood glucose level), risk of GDM and planning ahead for future pregnancies, recommended lifestyle interventions for reducing the risk of Type 2  Diabetes, when to call a Diabetes Educator or OB provider    Educational Materials provided today:  [facility] Diabetes    Patient verbalized understanding of diabetes self-management education concepts discussed, opportunities for ongoing education and support, and recommendations provided today    Plan       Inject 9 units Novolin NPH insulin at bedtime.    2.  Check glucose four times daily, before breakfast and 1 hour after each meal.  3.  Check ketones once a week when readings are consistently negative.  4.  Continue with recommended physical activity.  5.  Continue to follow recommended meal plan: 30-45g carbohydratess at breakfast, 45-60g carbohydratess at lunch, 45-60g carbohydrates at supper, 15-30g carbohydrates at snacks.  6.  Follow up with Diabetes Education 3/6/25 at 9am (virtual visit).  If you have questions you can send them through [employer] or call Diabetes Education Triage [phone].  For Scheduling call [phone].    Follow-up:    Follow up on Upcoming Diabetes Ed Appointments     Visit Type Date Time Department    GDM FOLLOW UP 2/26/2025  1:00 PM CL DIABETES ED    GDM ED SHORT 3/6/2025  9:00 AM [facility]Z DIABETES ED      Subjective/Objective  Asmita is an 35 year old year old, presenting for the following diabetes education related to:      Accompanied by: Self  Diabetes management related comments/concerns: glad to have inperson visit  Gestational weeks: 29w5d  Hospital planned for delivery: 2/26  Next OB Visit Date: 03/06/25  Number of previous pregnancies: 3  Had any babies over 9 lbs: No  Previously had Gestational Diabetes: Yes  Had Diabetes Education before: Yes  Previous insulin or other diabetes medication during that pregnancy: Yes  Have you ever had thyroid problems or taken thyroid medication?: No  Heart disease, mitral valve prolapse or rheumatic fever?: No  Hypertension : No  High Cholesterol: Yes  High Triglycerides: No  Do you use tobacco products?: No  Do you drink beer,  wine or hard liquor?: No    Cultural Influences/Ethnic Background:   or       LMP 2024         Estimated Date of Delivery: May 9, 2025    Blood Glucose/Ketone Log:     Blood Glucose/Ketone Log:   Date Ketones Fasting Post Breakfast Post Lunch Post Supper    neg 94 120      neg 98 143 119 106    neg 104 114 106 125    neg 96 119 129 129    neg 101 139 131 100    neg 94 125 109 129   Just got the meter late on 24.        Healthy Eating:  Exercise:: Yes (walking alot at home)  Barrier to exercise: None  Cultural/Mandaen diet restrictions?: No  Meal planning/habits: None  How many times a week on average do you eat food made away from home (restaurant/take-out)?: 1  Meals include: Breakfast, Lunch, Dinner, Evening Snack  Breakfast: quessadillas w/ cheese and cream, coffee (no added sugar)  Lunch: 1 cup pasta salome, water  Dinner: 7:30-8pm:  beans w/ spinach, cream & cheese, pineapple water  Snacks: PM: 1/2 apple , HS: 1/2 banana or cucumber, last night 1.5 egg  Beverages: Water, Tea, Sports drinks  Biggest challenges to healthy eating: Portion control  Pre-lanette vitamin?: Yes  Supplements?: No  Experiencing nausea?: Yes  Experiencing heartburn?: Yes    Healthy Coping:  Emotional response to diabetes: Acceptance  Informal Support system:: Spouse  Stage of change: ACTION (Actively working towards change)    Current Management:  Taking medications for gestational diabetes?: No    KAYLA SHAH  Time Spent: 55 minutes  Encounter Type: Individual     Diabetes medication dose changes were made via the CDCES Standing Orders under the patient's referring provider.

## 2025-02-26 NOTE — Clinical Note
2/26/2025         RE: Asmita Frye  [address] [facility] [address]        Dear Colleague,    Thank you for referring your patient, Asmita Frye, to the [facility]. Please see a copy of my visit note below.    Diabetes Self-Management Education & Support    Type of service:  Video Visit    If the video visit is dropped, the video visit invitation should be resent by: Text to cell phone: [phone]    Originating Location (pt. Location): Home  Distant Location (provider location): [facility]  Mode of Communication:  Video Conference via [employer]    Video Start Time:  1:01pm  Video End Time (time video stopped): 1:56pm   ID: [id number]    How would patient like to obtain AVS? MyChart      Assessment  Ketones: neg.   Fasting blood glucoses: 33% in target.  After breakfast: 83% in target.  After lunch: 100% in target.  After dinner: 100% in target.  Recent weight:  96.3 kg    Based on reported BG recommended starting insulin today.  Pt had used insulin in previous pregnancy but does not remember how to inject.  Instructed patient on use of vial and syringe.  Discussed: disposable of sharps ([facility] in [place]).  Pt expressed understanding.      Intervention  Educational topics covered today:  What to expect after delivery, future testing for Type 2 diabetes (2 hour OGTT at 6 week post-partum check-up and annual fasting blood glucose level), risk of GDM and planning ahead for future pregnancies, recommended lifestyle interventions for reducing the risk of Type 2 Diabetes, when to call a Diabetes Educator or OB provider    Educational Materials provided today:  [facility] Diabetes    Patient verbalized understanding of diabetes self-management education concepts discussed, opportunities for ongoing education and support, and recommendations provided today    Plan  Inject 9 units Novolin NPH  "insulin at bedtime.    Check glucose four times daily, before breakfast and 1 hour after each meal.  Check ketones once a week when readings are consistently negative.  Continue with recommended physical activity.  Continue to follow recommended meal plan: 30-45g carbohydratess at breakfast, 45-60g carbohydratess at lunch, 45-60g carbohydrates at supper, 15-30g carbohydrates at snacks.  Follow consistent carbohydrate meal plan, eat carbohydrates and protein/fat at all meals/snacks.    Send in Glucose Log (blood sugars) via [email or web address] in {Blood sugar lo::\"7 days\"}. If 3 or more blood sugars are above the goal at a given time, or if Ketones are small, moderate or large, call or [email or web address] message the diabetes educator.    Follow-up:    Follow up on Upcoming Diabetes Ed Appointments     Visit Type Date Time Department    GDM FOLLOW UP 2025  1:00 PM  DIABETES ED        Subjective/Objective  Asmita is an 35 year old year old, presenting for the following diabetes education related to:           Cultural Influences/Ethnic Background:   or       LMP 2024       Estimated Date of Delivery: May 9, 2025    Blood Glucose/Ketone Log:   Date Ketones Fasting Post Breakfast Post Lunch Post Supper    neg 94 120      neg 98 143 119 106    neg 104 114 106 125    neg 96 119 129 129    neg 101 139 131 100    neg 94 125 109 129   Just got the meter late on 24.      Healthy Eating:       Healthy Coping:       Current Management:       KAYLA SHAH  Time Spent: {:569833} minutes  Encounter Type: Individual     Diabetes medication dose changes were made via the CDCES Standing Orders under the patient's referring provider.    Diabetes Self-Management Education & Support    Type of service:  Video Visit    If the video visit is dropped, the video visit invitation should be resent by: Text to cell phone: [phone]    Originating Location (pt. Location): Home  Distant Location " (provider location): [facility]  Mode of Communication:  Video Conference via [employer]    Video Start Time:  1:01pm  Video End Time (time video stopped): 1:56pm   ID: [id number]    How would patient like to obtain AVS? [employer]    Assessment  Ketones: neg.   Fasting blood glucoses: 33% in target.  After breakfast: 83% in target.  After lunch: 100% in target.  After dinner: 100% in target.  Recent weight:  96.3 kg (starting insulin 0.1 units/kg = 9-10 units/day)    Based on reported BG recommended starting insulin today.  She does feel that if she has a bedtime snack, fasting BG are lower.  She will continue to work on this.  Pt had used insulin in previous pregnancy.  She remembers using a pen.  Insurance not covering the pen.    Instructed on use of vial and syringe. She also had questions about disposing of sharps.  Information given for   [facility] in [place].  Pt expressed understanding.  Due to use of  will schedule her for virtual visit, but she said she might be able to send in BG readings via [employer].      Intervention  Educational topics covered today:  What to expect after delivery, future testing for Type 2 diabetes (2 hour OGTT at 6 week post-partum check-up and annual fasting blood glucose level), risk of GDM and planning ahead for future pregnancies, recommended lifestyle interventions for reducing the risk of Type 2 Diabetes, when to call a Diabetes Educator or OB provider    Educational Materials provided today:  [facility] Diabetes    Patient verbalized understanding of diabetes self-management education concepts discussed, opportunities for ongoing education and support, and recommendations provided today    Plan       Inject 9 units Novolin NPH insulin at bedtime.    2.  Check glucose four times daily, before breakfast and 1 hour after each meal.  3.  Check ketones once a week when readings are consistently  negative.  4.  Continue with recommended physical activity.  5.  Continue to follow recommended meal plan: 30-45g carbohydratess at breakfast, 45-60g carbohydratess at lunch, 45-60g carbohydrates at supper, 15-30g carbohydrates at snacks.  6.  Follow up with Diabetes Education 3/6/25 at 9am (virtual visit).  If you have questions you can send them through [employer] or call Diabetes Education Triage [phone].  For Scheduling call [phone].    Follow-up:    Follow up on Upcoming Diabetes Ed Appointments     Visit Type Date Time Department    GDM FOLLOW UP 2/26/2025  1:00 PM CL DIABETES ED    GDM ED SHORT 3/6/2025  9:00 AM FZ DIABETES ED      Subjective/Objective  Asmita is an 35 year old year old, presenting for the following diabetes education related to:      Accompanied by: Self  Diabetes management related comments/concerns: glad to have inperson visit  Gestational weeks: 29w5d  Hospital planned for delivery: 2/26  Next OB Visit Date: 03/06/25  Number of previous pregnancies: 3  Had any babies over 9 lbs: No  Previously had Gestational Diabetes: Yes  Had Diabetes Education before: Yes  Previous insulin or other diabetes medication during that pregnancy: Yes  Have you ever had thyroid problems or taken thyroid medication?: No  Heart disease, mitral valve prolapse or rheumatic fever?: No  Hypertension : No  High Cholesterol: Yes  High Triglycerides: No  Do you use tobacco products?: No  Do you drink beer, wine or hard liquor?: No    Cultural Influences/Ethnic Background:   or       LMP 07/16/2024         Estimated Date of Delivery: May 9, 2025    Blood Glucose/Ketone Log:     Blood Glucose/Ketone Log:   Date Ketones Fasting Post Breakfast Post Lunch Post Supper   2/26 neg 94 120     2/25 neg 98 143 119 106   2/24 neg 104 114 106 125   2/23 neg 96 119 129 129   2/22 neg 101 139 131 100   2/21 neg 94 125 109 129   Just got the meter late on 2/20/24.        Healthy Eating:  Exercise:: Yes (walking  alot at home)  Barrier to exercise: None  Cultural/Jain diet restrictions?: No  Meal planning/habits: None  How many times a week on average do you eat food made away from home (restaurant/take-out)?: 1  Meals include: Breakfast, Lunch, Dinner, Evening Snack  Breakfast: quessadillas w/ cheese and cream, coffee (no added sugar)  Lunch: 1 cup pasta salome, water  Dinner: 7:30-8pm:  beans w/ spinach, cream & cheese, pineapple water  Snacks: PM: 1/2 apple , HS: 1/2 banana or cucumber, last night 1.5 egg  Beverages: Water, Tea, Sports drinks  Biggest challenges to healthy eating: Portion control  Pre-lanette vitamin?: Yes  Supplements?: No  Experiencing nausea?: Yes  Experiencing heartburn?: Yes    Healthy Coping:  Emotional response to diabetes: Acceptance  Informal Support system:: Spouse  Stage of change: ACTION (Actively working towards change)    Current Management:  Taking medications for gestational diabetes?: No    KAYLA SHAH  Time Spent: 55 minutes  Encounter Type: Individual     Diabetes medication dose changes were made via the CDCES Standing Orders under the patient's referring provider.

## 2025-02-26 NOTE — PROGRESS NOTES
Diabetes Self-Management Education & Support    Type of service:  Video Visit    If the video visit is dropped, the video visit invitation should be resent by: Text to cell phone: [phone]    Originating Location (pt. Location): Home  Distant Location (provider location): [facility]  Mode of Communication:  Video Conference via [employer]    Video Start Time:  1:01pm  Video End Time (time video stopped): 1:56pm   ID: [id number]    How would patient like to obtain AVS? MyChart    Assessment  Ketones: neg.   Fasting blood glucoses: 33% in target.  After breakfast: 83% in target.  After lunch: 100% in target.  After dinner: 100% in target.  Recent weight:  96.3 kg (starting insulin 0.1 units/kg = 9-10 units/day)    Based on reported BG recommended starting insulin today.  She does feel that if she has a bedtime snack, fasting BG are lower.  She will continue to work on this.  Pt had used insulin in previous pregnancy.  She remembers using a pen.  Insurance not covering the pen.    Instructed on use of vial and syringe. She also had questions about disposing of sharps.  Information given for   [facility] in [place].  Pt expressed understanding.  Due to use of  will schedule her for virtual visit, but she said she might be able to send in BG readings via [email or web address].      Intervention  Educational topics covered today:  What to expect after delivery, future testing for Type 2 diabetes (2 hour OGTT at 6 week post-partum check-up and annual fasting blood glucose level), risk of GDM and planning ahead for future pregnancies, recommended lifestyle interventions for reducing the risk of Type 2 Diabetes, when to call a Diabetes Educator or OB provider    Educational Materials provided today:  [facility] Diabetes    Patient verbalized understanding of diabetes self-management education concepts discussed, opportunities for ongoing education and  support, and recommendations provided today    Plan       Inject 9 units Novolin NPH insulin at bedtime.    2.  Check glucose four times daily, before breakfast and 1 hour after each meal.  3.  Check ketones once a week when readings are consistently negative.  4.  Continue with recommended physical activity.  5.  Continue to follow recommended meal plan: 30-45g carbohydratess at breakfast, 45-60g carbohydratess at lunch, 45-60g carbohydrates at supper, 15-30g carbohydrates at snacks.  6.  Follow up with Diabetes Education 3/6/25 at 9am (virtual visit).  If you have questions you can send them through [employer] or call Diabetes Education Triage [phone].  For Scheduling call [phone].    Follow-up:    Follow up on Upcoming Diabetes Ed Appointments     Visit Type Date Time Department    GDM FOLLOW UP 2/26/2025  1:00 PM [facility] DIABETES ED    GDM ED SHORT 3/6/2025  9:00 AM [facility] DIABETES ED      Subjective/Objective  Asmita is an 35 year old year old, presenting for the following diabetes education related to:      Accompanied by: Self  Diabetes management related comments/concerns: glad to have inperson visit  Gestational weeks: 29w5d  Hospital planned for delivery: 2/26  Next OB Visit Date: 03/06/25  Number of previous pregnancies: 3  Had any babies over 9 lbs: No  Previously had Gestational Diabetes: Yes  Had Diabetes Education before: Yes  Previous insulin or other diabetes medication during that pregnancy: Yes  Have you ever had thyroid problems or taken thyroid medication?: No  Heart disease, mitral valve prolapse or rheumatic fever?: No  Hypertension : No  High Cholesterol: Yes  High Triglycerides: No  Do you use tobacco products?: No  Do you drink beer, wine or hard liquor?: No    Cultural Influences/Ethnic Background:   or       LMP 07/16/2024         Estimated Date of Delivery: May 9, 2025    Blood Glucose/Ketone Log:     Blood Glucose/Ketone Log:   Date Ketones Fasting Post Breakfast Post Lunch  Post Supper    neg 94 120      neg 98 143 119 106    neg 104 114 106 125    neg 96 119 129 129    neg 101 139 131 100    neg 94 125 109 129   Just got the meter late on 24.        Healthy Eating:  Exercise:: Yes (walking alot at home)  Barrier to exercise: None  Cultural/Catholic diet restrictions?: No  Meal planning/habits: None  How many times a week on average do you eat food made away from home (restaurant/take-out)?: 1  Meals include: Breakfast, Lunch, Dinner, Evening Snack  Breakfast: quessadillas w/ cheese and cream, coffee (no added sugar)  Lunch: 1 cup pasta salome, water  Dinner: 7:30-8pm:  beans w/ spinach, cream & cheese, pineapple water  Snacks: PM: 1/2 apple , HS: 1/2 banana or cucumber, last night 1.5 egg  Beverages: Water, Tea, Sports drinks  Biggest challenges to healthy eating: Portion control  Pre- vitamin?: Yes  Supplements?: No  Experiencing nausea?: Yes  Experiencing heartburn?: Yes    Healthy Coping:  Emotional response to diabetes: Acceptance  Informal Support system:: Spouse  Stage of change: ACTION (Actively working towards change)    Current Management:  Taking medications for gestational diabetes?: No    KAYLA SHAH  Time Spent: 55 minutes  Encounter Type: Individual     Diabetes medication dose changes were made via the CDCES Standing Orders under the patient's referring provider.

## 2025-03-05 ENCOUNTER — PRENATAL OFFICE VISIT (OUTPATIENT)
Dept: OBGYN | Facility: CLINIC | Age: 35
End: 2025-03-05
Payer: COMMERCIAL

## 2025-03-05 VITALS
HEART RATE: 101 BPM | BODY MASS INDEX: 39.88 KG/M2 | DIASTOLIC BLOOD PRESSURE: 70 MMHG | WEIGHT: 211.2 LBS | TEMPERATURE: 98.4 F | RESPIRATION RATE: 18 BRPM | HEIGHT: 61 IN | SYSTOLIC BLOOD PRESSURE: 114 MMHG

## 2025-03-05 DIAGNOSIS — E66.812 CLASS 2 OBESITY WITHOUT SERIOUS COMORBIDITY WITH BODY MASS INDEX (BMI) OF 38.0 TO 38.9 IN ADULT, UNSPECIFIED OBESITY TYPE: ICD-10-CM

## 2025-03-05 DIAGNOSIS — O24.414 INSULIN CONTROLLED GESTATIONAL DIABETES MELLITUS (GDM) IN THIRD TRIMESTER: Primary | ICD-10-CM

## 2025-03-05 DIAGNOSIS — Z98.891 HISTORY OF CESAREAN SECTION: ICD-10-CM

## 2025-03-05 DIAGNOSIS — O09.522 MULTIGRAVIDA OF ADVANCED MATERNAL AGE IN SECOND TRIMESTER: ICD-10-CM

## 2025-03-05 DIAGNOSIS — Z34.83 PRENATAL CARE, SUBSEQUENT PREGNANCY IN THIRD TRIMESTER: ICD-10-CM

## 2025-03-05 PROCEDURE — 3078F DIAST BP <80 MM HG: CPT | Performed by: OBSTETRICS & GYNECOLOGY

## 2025-03-05 PROCEDURE — 0502F SUBSEQUENT PRENATAL CARE: CPT | Performed by: OBSTETRICS & GYNECOLOGY

## 2025-03-05 PROCEDURE — 3074F SYST BP LT 130 MM HG: CPT | Performed by: OBSTETRICS & GYNECOLOGY

## 2025-03-05 PROCEDURE — 99207 PR PRENATAL VISIT: CPT | Performed by: OBSTETRICS & GYNECOLOGY

## 2025-03-05 NOTE — NURSING NOTE
"Initial /70 (BP Location: Left arm, Patient Position: Sitting, Cuff Size: Adult Large)   Pulse 101   Temp 98.4  F (36.9  C) (Tympanic)   Resp 18   Ht 1.549 m (5' 1\")   Wt 95.8 kg (211 lb 3.2 oz)   LMP 07/16/2024   BMI 39.91 kg/m   Estimated body mass index is 39.91 kg/m  as calculated from the following:    Height as of this encounter: 1.549 m (5' 1\").    Weight as of this encounter: 95.8 kg (211 lb 3.2 oz). .    "

## 2025-03-05 NOTE — PROGRESS NOTES
"[facility]    Return OB Note    CC: Return OB     Subjective:  Asmita is a 35 year old  at 30w5d   Denies vaginal bleeding, loss of fluid, or regular contractions. Good fetal movement.  Complaints today: Insulin is burning some with injection. She has an NPH vial as insurance didn't cover the pen, which she used last pregnancy. Her insurance just changes so will recheck with pharmacy if the pen would be covered as that seemed to be better tolerated.     Objective:  /70 (BP Location: Left arm, Patient Position: Sitting, Cuff Size: Adult Large)   Pulse 101   Temp 98.4  F (36.9  C) (Tympanic)   Resp 18   Ht 1.549 m (5' 1\")   Wt 95.8 kg (211 lb 3.2 oz)   LMP 2024   BMI 39.91 kg/m      Fundal height: 32cm  FHT: 140bpm      Assessment/Plan:   Encounter Diagnoses   Name Primary?    Insulin controlled gestational diabetes mellitus (GDM) in third trimester Yes    Prenatal care, subsequent pregnancy in third trimester     History of  section     Multigravida of advanced maternal age in second trimester     Class 2 obesity without serious comorbidity with body mass index (BMI) of 38.0 to 38.9 in adult, unspecified obesity type        IUP at 30w5d  -NOB labs WNL  -Prenatal screening: declines  -Anatomy US: L2 WNL  -Mid trimester labs: gestational diabetes, anemia     Co-Morbidities/Complications/Concerns:   -S>D: growth US ordered  -GDMA2: started on insulin, NPH at night. Reports fastings have been mostly normal, had an elevated one at 118 this morning. Did not have a bedtime snack, encouraged to do this. Will continue to monitor and assess insulin requirements. Will need serial growth US and twice weekly BPPs at 32 weeks, ordered today. Plan delivery at 39 weeks.   -Iron deficiency anemia: taking PO iron, plan recheck HB next visit  -Hx C section x3: plan C section for delivery, likely 5/2 with KB. Will confirm date at next appointment. Still considering tubal " ligation.  -AMA: S/p L2  -Obesity: BMI 38.Plan growth US at 32 weeks, BPPs at 37 weeks.   -2 risk factors for pre-e: recommend ASA   -Strict return precautions given    RTC 2 weeks    Melodie Sesay DO

## 2025-03-06 ENCOUNTER — VIRTUAL VISIT (OUTPATIENT)
Dept: EDUCATION SERVICES | Facility: CLINIC | Age: 35
End: 2025-03-06
Payer: COMMERCIAL

## 2025-03-06 NOTE — PROGRESS NOTES
Diabetes and Pregnancy Follow-up  Type of Service: Video Visit/ 36 minutes     Originating Location (Patient Location): [place]  Distant Location (Provider Location): [place] [facility]  Mode of Communication:  Video     Video Visit Start Time: 9:06 AM  Video Visit End Time (telephone visit stop time): 9:42 AM     How would patient like to obtain AVS? not needed    Subjective/Objective:    Asmita Frye was called for a scheduled BG review. Last date of communication was: 2/26/25.    Gestational diabetes is being managed with medications    Taking diabetes medications: yes:     Diabetes Medication(s)       Insulin       insulin  UNIT/ML vial Inject 9 Units subcutaneously daily. Max dose: 20 units daily. Started 2/27            Estimated Date of Delivery: May 9, 2025    Blood Glucose/Ketone Log:    Date Ketones Fasting Post Breakfast Post Lunch Post Supper   2/27 negative 99 157 140 118   2/28 negative 90 140 124 121   3/1  92 100 100 112   3/2  95 104 130 105   3/3  102 140 130 111   3/4  89 134 115 132   3/5  118  130 121   3/6  105      (Testing 1 hour after meals)    Assessment:  Ketones: negative x2.   Fasting blood glucoses: 50% in target.  After breakfast: 83% in target.  Before lunch: n/a% in target.  After lunch: 100% in target.  Before dinner: n/a% in target.  After dinner: 100% in target.    HS snacks sometimes - whole bread w/ peanut butter     Dinner last night: sandwich with vegetables and salmon     Plan/Response:  Follow-up on Monday.  Increase NPH to 11 units/day at     Marisol Strange, MPH, RD, CDCES, LD 3/6/2025  Time Spent: 36 minutes    Any diabetes medication dose changes were made via the CDE Protocol and Collaborative Practice Agreement with the patient's referring provider. A copy of this encounter was shared with the provider.

## 2025-03-10 ENCOUNTER — VIRTUAL VISIT (OUTPATIENT)
Dept: EDUCATION SERVICES | Facility: CLINIC | Age: 35
End: 2025-03-10
Payer: COMMERCIAL

## 2025-03-10 DIAGNOSIS — O24.414 GESTATIONAL DIABETES MELLITUS (GDM) REQUIRING INSULIN: Primary | ICD-10-CM

## 2025-03-10 PROCEDURE — 98967 PH1 ASSMT&MGMT NQHP 11-20: CPT | Mod: 95

## 2025-03-10 NOTE — LETTER
3/10/2025         RE: Asmita Frye  [address] [facility] [address]        Dear Colleague,    Thank you for referring your patient, Asmita Frye, to the [facility]. Please see a copy of my visit note below.    Diabetes and Pregnancy Follow-up  Type of Service: Video Visit/ 19 minutes     Originating Location (Patient Location): Home  Distant Location (Provider Location): [place] [facility]  Mode of Communication:  Video  Nepali phone  #[phone]     Video Visit Start Time: 8:32 AM  Video Visit End Time: 8:51 AM     How would patient like to obtain AVS? Not needed    Subjective/Objective:    Asmita Frye was called for a scheduled BG review. Last date of communication was: 3/6/25.    Gestational diabetes is being managed with medications    Taking diabetes medications: yes:     Diabetes Medication(s)       Insulin       insulin  UNIT/ML vial Inject 11 Units subcutaneously daily. Max dose: 20 units daily.          Estimated Date of Delivery: May 9, 2025    Blood Glucose/Ketone Log:    Date Ketones Fasting Post Breakfast Post Lunch Post Supper   3/6   125 123 111   3/7 negative 94 132 120 127   3/8 negative 100 143 145 120   3/9 negative 96 111 130 100   3/10 trace 95      Testing 1 hour post meals.    Assessment:  Ketones: negative x3, trace x1.   Fasting blood glucoses: 50% in target.  After breakfast: n/a% in target.  Before lunch: n/a% in target.  After lunch: 75% in target.  Before dinner: n/a% in target.  After dinner: 100% in target.    Changed insulin injection sites as discussed at last visit and the pain has been eliminated!  No questions/concerns.  Reviewed how insulin needs change throughout pregnancy and impact of placental hormones on insulin resistance. Prefers visits for insulin adjustments.     Plan/Response:  Continue to check BG 4 times daily (fasting and one hour(s) after each meal).  Reduce ketone checks to once a week.  Increase NPH  to 13 units/day at HS.  Follow-up on 3/13/25 at 8:30 AM  IF going to bed 2 or more hours after dinner meal, have a snack containing both carbohydrate and protein: for example = Greek yogurt OR slice of whole grain bread and peanut butter.     Marisol Strange, MPH, RD, CDCES, LD 3/10/2025    Time Spent: 19 minutes    Any diabetes medication dose changes were made via the CDE Protocol and Collaborative Practice Agreement with the patient's referring provider. A copy of this encounter was shared with the provider.

## 2025-03-10 NOTE — PROGRESS NOTES
Diabetes and Pregnancy Follow-up  Type of Service: Video Visit/ 19 minutes     Originating Location (Patient Location): Home  Distant Location (Provider Location): [facility]  Mode of Communication:  Video  Turkmen phone  #[phone]     Video Visit Start Time: 8:32 AM  Video Visit End Time: 8:51 AM     How would patient like to obtain AVS? Not needed    Subjective/Objective:    Asmita Frye was called for a scheduled BG review. Last date of communication was: 3/6/25.    Gestational diabetes is being managed with medications    Taking diabetes medications: yes:     Diabetes Medication(s)       Insulin       insulin  UNIT/ML vial Inject 11 Units subcutaneously daily. Max dose: 20 units daily.          Estimated Date of Delivery: May 9, 2025    Blood Glucose/Ketone Log:    Date Ketones Fasting Post Breakfast Post Lunch Post Supper   3/6   125 123 111   3/7 negative 94 132 120 127   3/8 negative 100 143 145 120   3/9 negative 96 111 130 100   3/10 trace 95      Testing 1 hour post meals.    Assessment:  Ketones: negative x3, trace x1.   Fasting blood glucoses: 50% in target.  After breakfast: n/a% in target.  Before lunch: n/a% in target.  After lunch: 75% in target.  Before dinner: n/a% in target.  After dinner: 100% in target.    Changed insulin injection sites as discussed at last visit and the pain has been eliminated!  No questions/concerns.  Reviewed how insulin needs change throughout pregnancy and impact of placental hormones on insulin resistance. Prefers visits for insulin adjustments.     Plan/Response:  Continue to check BG 4 times daily (fasting and one hour(s) after each meal).  Reduce ketone checks to once a week.  Increase NPH to 13 units/day at .  Follow-up on 3/13/25 at 8:30 AM  IF going to bed 2 or more hours after dinner meal, have a snack containing both carbohydrate and protein: for example = Greek yogurt OR slice of whole grain bread and peanut butter.     Marisol  Alexandr, MPH, RD, CDCES, LD 3/10/2025    Time Spent: 19 minutes    Any diabetes medication dose changes were made via the CDE Protocol and Collaborative Practice Agreement with the patient's referring provider. A copy of this encounter was shared with the provider.

## 2025-03-13 ENCOUNTER — VIRTUAL VISIT (OUTPATIENT)
Dept: EDUCATION SERVICES | Facility: CLINIC | Age: 35
End: 2025-03-13
Payer: COMMERCIAL

## 2025-03-13 DIAGNOSIS — O24.414 GESTATIONAL DIABETES MELLITUS (GDM) REQUIRING INSULIN: Primary | ICD-10-CM

## 2025-03-13 NOTE — LETTER
3/13/2025         RE: Asmita Frye  [address] [facility] [place] [address]        Dear Colleague,    Thank you for referring your patient, Asmita Frye, to the [facility]. Please see a copy of my visit note below.    Diabetes and Pregnancy Follow-up  Type of Service: Telephone Visit/ 14 minutes     Originating Location (Patient Location): Home  Distant Location (Provider Location): [place] [facility]  Mode of Communication:  Telephone     Telephone Visit Start Time: 8:41 AM  Telephone Visit End Time (telephone visit stop time): 8:55 AM   #[id number] used for the duration of the visit  Patient disconnected from video visit at time writer logged on.  Patient re-connected to video visit when writer already in process of calling with .  When reached patient, patient elected to continue visit via phone.     How would patient like to obtain AVS? Not needed      Subjective/Objective:    Asmita Frye was called for a scheduled BG review. Last date of communication was: 3/10/25.    Gestational diabetes is being managed with medications    Taking diabetes medications: yes:     Diabetes Medication(s)       Insulin       insulin  UNIT/ML vial Inject 13 Units subcutaneously daily. Max dose: 20 units daily.            Estimated Date of Delivery: May 9, 2025    Blood Glucose/Ketone Log:    Date Ketones Fasting Post Breakfast Post Lunch Post Supper   3/10   126 118 104   3/11 negative 100 122 126 113   3/12 negative 100 168 (cereal, strawberry, milk) 125 131   3/13 negative 91      Testing 1 hour post meal    Assessment:  Ketones: negative x3.   Fasting blood glucoses: 33% in target.  After breakfast: 67% in target.  Before lunch: n/a% in target.  After lunch: 100% in target.  Before dinner: n/a% in target.  After dinner: 100% in target.    Discussed impact of placental hormones on insulin resistance.    Plan/Response:  Follow-up on Monday.  Increase NPH to  15 units/day (HS).  IF having cold cereal and milk, have a small portion as afternoon snack.    Marisol Strange, MPH, RD, CDCES, LD 3/13/2025    Time Spent: 14 minutes    Any diabetes medication dose changes were made via the CDE Protocol and Collaborative Practice Agreement with the patient's referring provider. A copy of this encounter was shared with the provider.

## 2025-03-13 NOTE — PROGRESS NOTES
Diabetes and Pregnancy Follow-up  Type of Service: Telephone Visit/ 14 minutes     Originating Location (Patient Location): Home  [place] [facility] [place]  Mode of Communication:  Telephone     Telephone Visit Start Time: 8:41 AM  Telephone Visit End Time (telephone visit stop time): 8:55 AM   #[id number] used for the duration of the visit  Patient disconnected from video visit at time writer logged on.  Patient re-connected to video visit when writer already in process of calling with .  When reached patient, patient elected to continue visit via phone.     How would patient like to obtain AVS? Not needed      Subjective/Objective:    Asmita Frye was called for a scheduled BG review. Last date of communication was: 3/10/25.    Gestational diabetes is being managed with medications    Taking diabetes medications: yes:     Diabetes Medication(s)       Insulin       insulin  UNIT/ML vial Inject 13 Units subcutaneously daily. Max dose: 20 units daily.            Estimated Date of Delivery: May 9, 2025    Blood Glucose/Ketone Log:    Date Ketones Fasting Post Breakfast Post Lunch Post Supper   3/10   126 118 104   3/11 negative 100 122 126 113   3/12 negative 100 168 (cereal, strawberry, milk) 125 131   3/13 negative 91      Testing 1 hour post meal    Assessment:  Ketones: negative x3.   Fasting blood glucoses: 33% in target.  After breakfast: 67% in target.  Before lunch: n/a% in target.  After lunch: 100% in target.  Before dinner: n/a% in target.  After dinner: 100% in target.    Discussed impact of placental hormones on insulin resistance.    Plan/Response:  Follow-up on Monday.  Increase NPH to 15 units/day (HS).  IF having cold cereal and milk, have a small portion as afternoon snack.    Marisol Strange, MPH, RD, CDCES, LD 3/13/2025    Time Spent: 14 minutes    Any diabetes medication dose changes were made via the CDE Protocol and  Collaborative Practice Agreement with the patient's referring provider. A copy of this encounter was shared with the provider.

## 2025-03-16 ENCOUNTER — HEALTH MAINTENANCE LETTER (OUTPATIENT)
Age: 35
End: 2025-03-16

## 2025-03-17 ENCOUNTER — VIRTUAL VISIT (OUTPATIENT)
Dept: EDUCATION SERVICES | Facility: CLINIC | Age: 35
End: 2025-03-17
Payer: COMMERCIAL

## 2025-03-17 DIAGNOSIS — O24.414 INSULIN CONTROLLED GESTATIONAL DIABETES MELLITUS (GDM) IN THIRD TRIMESTER: Primary | ICD-10-CM

## 2025-03-17 DIAGNOSIS — O24.414 GESTATIONAL DIABETES MELLITUS (GDM) REQUIRING INSULIN: ICD-10-CM

## 2025-03-17 PROCEDURE — G0108 DIAB MANAGE TRN  PER INDIV: HCPCS | Mod: 95

## 2025-03-17 NOTE — Clinical Note
Hi Asmita Remy's post meal glucoses were mostly elevated in the past 4 days which is not what the pattern has been over the past month.  She's going to try to add in some post meal walking/activity.  We are following up in 3 days.  Let me know if you have any questions/concerns with this plan.  Thank you! Marisol Strange, MPH, RD, CDCES, LD 3/17/2025

## 2025-03-17 NOTE — PROGRESS NOTES
Diabetes and Pregnancy Follow-up  Type of Service: Video Visit/ 37 minutes     Originating Location (Patient Location): Home  [place] [facility] [place]  Mode of Communication:  Video   Hong Konger phone  #[phone] used for the duration of the visit    Video Visit Start Time: 9:36 AM  Video Visit End Time : 10:13 AM     How would patient like to obtain AVS? Leilani      Subjective/Objective:    Asmita Frye was called for a scheduled BG review. Last date of communication was: 3/13/25.    Gestational diabetes is being managed with medications    Taking diabetes medications: yes:     Diabetes Medication(s)       Insulin       insulin  UNIT/ML vial Inject 15 Units subcutaneously daily. Max dose: 20 units daily.            Estimated Date of Delivery: May 9, 2025    Blood Glucose/Ketone Log:    Date Ketones Fasting Post Breakfast Post Lunch Post Supper   3/13   230 (3 tortillas, eggs, ham, half mandarin, green tea) 112 118   3/14 trace 100 150/130* 141 170/102*   3/15 negative 92 158 150/105* 104   3/16 trace 87 111 161/118* (out: 1/2 omelette, 1/2 pancake with syrup 148   3/17 negative 100      Testing 1 hour after meals unless * = 2 hours after    Assessment:  Ketones: negative .   Fasting blood glucoses: 50% in target.  After breakfast: 25% in target at 1 hour (n=4), 100% in target at 2 hours (n=1).  Before lunch: n/a in target at 1 hour  After lunch: 25% in target at 1 hour (n=4), 100% in target at 2 hours (n=2)  Before dinner: n/a% in target.  After dinner: 50% in target at 1 hour (n=4), 100% in target at 2 hours (n=1)    Many post meal glucoses elevated in the past 4 days which is a change in patient's glucose control.  No recent significant changes.  Does have some stress related to her 14 year old son.  Plan made to have patient start/increase physical activity post meals.  Asking about consuming a protein/granola bar for HS snack and the bar she has in her home  has over 10 grams protein, it only has 9 grams carbohydrate so asked her to consume 1/2 cup milk if she chooses it for HS snack. Will follow up in 3 days.      Plan/Response:  Patient Instructions   Test glucose 4 times per day:   Fasting (when you first awake for the day): below 95 mg/dL    1 hour after breakfast: below 140 mg/dL    1 hour after lunch: below 140 mg/dL    1 hour after dinner: below 140 mg/dL      Please bring your meter and log book to all appointments     If you miss 1 hour after meal test, test 2 hours after the meal.  Goal 2 hours after is below 120 mg/dL    2.  Check your urine ketones once a day, when you first awake for the day until they are negative to trace for 7 days in a row.  Then decrease and check once a week.     3.  Meal Plan   -stay away from sugar sweetened beverages (regular soda, juice, sweetened tea, etc), syrup, jelly, and honey   -if you have the protein/granola bar for a snack at bedtime, have 1/2 cup (4 oz) cow's milk with it    4.  Try to walk for 5-10 minutes following each meal.  If weather not amenable, stay upright - walk the dishes, vacuum, etc.  Make sure your OB considers the activity you choose safe.     5. Increase NPH to 18 units once a day at bedtime    6.  Follow up: virtual with Marisol on Thursday, March 20th at 9:30 AM    7.  Keep something with you for treating a low blood sugar (below 60-80 mg/dL).  If your blood sugar is low, eat/drink ONE of the following:   -1/2 cup juice  -1/2 cup regular soda  -1 package fruit snacks  -4 glucose tablets  Then, wait 15 minutes and re-check blood sugar.  If it is not above 70 mg/dL, repeat the above.    8.  Call Diabetes Education at [phone] or send a [employer] message with:   -questions or concerns   -ketones that are small, moderate, or large   -3 or more blood sugars above target in a 7 day period   -any low blood sugars    Marisol Strange, MPH, RD, CDCES, LD 3/17/2025    Time Spent: 37 minutes    Any diabetes  medication dose changes were made via the CDE Protocol and Collaborative Practice Agreement with the patient's referring provider. A copy of this encounter was shared with the provider.

## 2025-03-17 NOTE — PATIENT INSTRUCTIONS
Test glucose 4 times per day:   Fasting (when you first awake for the day): below 95 mg/dL    1 hour after breakfast: below 140 mg/dL    1 hour after lunch: below 140 mg/dL    1 hour after dinner: below 140 mg/dL      Please bring your meter and log book to all appointments     If you miss 1 hour after meal test, test 2 hours after the meal.  Goal 2 hours after is below 120 mg/dL    2.  Check your urine ketones once a day, when you first awake for the day until they are negative to trace for 7 days in a row.  Then decrease and check once a week.     3.  Meal Plan   -stay away from sugar sweetened beverages (regular soda, juice, sweetened tea, etc), syrup, jelly, and honey   -if you have the protein/granola bar for a snack at bedtime, have 1/2 cup (4 oz) cow's milk with it    4.  Try to walk for 5-10 minutes following each meal.  If weather not amenable, stay upright - walk the dishes, vacuum, etc.  Make sure your OB considers the activity you choose safe.     5. Increase NPH to 18 units once a day at bedtime    6.  Follow up: virtual with Marisol on Thursday, March 20th at 9:30 AM    7.  Keep something with you for treating a low blood sugar (below 60-80 mg/dL).  If your blood sugar is low, eat/drink ONE of the following:   -1/2 cup juice  -1/2 cup regular soda  -1 package fruit snacks  -4 glucose tablets  Then, wait 15 minutes and re-check blood sugar.  If it is not above 70 mg/dL, repeat the above.    8.  Call Diabetes Education at [phone] or send a [employer] message with:   -questions or concerns   -ketones that are small, moderate, or large   -3 or more blood sugars above target in a 7 day period   -any low blood sugars    Thank you,     Marisol Strange, MPH, RD, CDCES, LD 3/17/2025

## 2025-03-19 ENCOUNTER — PREP FOR PROCEDURE (OUTPATIENT)
Dept: OBGYN | Facility: CLINIC | Age: 35
End: 2025-03-19

## 2025-03-19 ENCOUNTER — PRENATAL OFFICE VISIT (OUTPATIENT)
Dept: OBGYN | Facility: CLINIC | Age: 35
End: 2025-03-19
Payer: COMMERCIAL

## 2025-03-19 VITALS
HEIGHT: 61 IN | SYSTOLIC BLOOD PRESSURE: 118 MMHG | RESPIRATION RATE: 18 BRPM | BODY MASS INDEX: 39.88 KG/M2 | WEIGHT: 211.2 LBS | HEART RATE: 97 BPM | DIASTOLIC BLOOD PRESSURE: 59 MMHG

## 2025-03-19 DIAGNOSIS — D50.9 IRON DEFICIENCY ANEMIA DURING PREGNANCY: ICD-10-CM

## 2025-03-19 DIAGNOSIS — Z98.891 HISTORY OF CESAREAN SECTION: ICD-10-CM

## 2025-03-19 DIAGNOSIS — O34.219 PREGNANCY WITH HISTORY OF CESAREAN SECTION, ANTEPARTUM: Primary | ICD-10-CM

## 2025-03-19 DIAGNOSIS — O99.019 IRON DEFICIENCY ANEMIA DURING PREGNANCY: ICD-10-CM

## 2025-03-19 DIAGNOSIS — Z34.83 PRENATAL CARE, SUBSEQUENT PREGNANCY IN THIRD TRIMESTER: Primary | ICD-10-CM

## 2025-03-19 DIAGNOSIS — O09.523 MULTIGRAVIDA OF ADVANCED MATERNAL AGE IN THIRD TRIMESTER: ICD-10-CM

## 2025-03-19 DIAGNOSIS — E66.812 OBESITY, CLASS II, BMI 35-39.9: ICD-10-CM

## 2025-03-19 DIAGNOSIS — O24.414 INSULIN CONTROLLED GESTATIONAL DIABETES MELLITUS (GDM) IN THIRD TRIMESTER: ICD-10-CM

## 2025-03-19 DIAGNOSIS — E66.812 CLASS 2 OBESITY WITHOUT SERIOUS COMORBIDITY WITH BODY MASS INDEX (BMI) OF 38.0 TO 38.9 IN ADULT, UNSPECIFIED OBESITY TYPE: ICD-10-CM

## 2025-03-19 DIAGNOSIS — Z30.2 ENCOUNTER FOR STERILIZATION: ICD-10-CM

## 2025-03-19 LAB
ERYTHROCYTE [DISTWIDTH] IN BLOOD BY AUTOMATED COUNT: 14.5 % (ref 10–15)
HCT VFR BLD AUTO: 33 % (ref 35–47)
HGB BLD-MCNC: 11 G/DL (ref 11.7–15.7)
MCH RBC QN AUTO: 30.7 PG (ref 26.5–33)
MCHC RBC AUTO-ENTMCNC: 33.3 G/DL (ref 31.5–36.5)
MCV RBC AUTO: 92 FL (ref 78–100)
PLATELET # BLD AUTO: 282 10E3/UL (ref 150–450)
RBC # BLD AUTO: 3.58 10E6/UL (ref 3.8–5.2)
WBC # BLD AUTO: 7.4 10E3/UL (ref 4–11)

## 2025-03-19 PROCEDURE — 3074F SYST BP LT 130 MM HG: CPT | Performed by: OBSTETRICS & GYNECOLOGY

## 2025-03-19 PROCEDURE — 3078F DIAST BP <80 MM HG: CPT | Performed by: OBSTETRICS & GYNECOLOGY

## 2025-03-19 PROCEDURE — 0502F SUBSEQUENT PRENATAL CARE: CPT | Performed by: OBSTETRICS & GYNECOLOGY

## 2025-03-19 PROCEDURE — 99207 PR PRENATAL VISIT: CPT | Performed by: OBSTETRICS & GYNECOLOGY

## 2025-03-19 RX ORDER — CITRIC ACID/SODIUM CITRATE 334-500MG
30 SOLUTION, ORAL ORAL
OUTPATIENT
Start: 2025-03-19

## 2025-03-19 RX ORDER — CEFAZOLIN SODIUM/WATER 2 G/20 ML
2 SYRINGE (ML) INTRAVENOUS SEE ADMIN INSTRUCTIONS
OUTPATIENT
Start: 2025-03-19

## 2025-03-19 RX ORDER — LOPERAMIDE HYDROCHLORIDE 2 MG/1
4 CAPSULE ORAL
OUTPATIENT
Start: 2025-03-19

## 2025-03-19 RX ORDER — LOPERAMIDE HYDROCHLORIDE 2 MG/1
2 CAPSULE ORAL
OUTPATIENT
Start: 2025-03-19

## 2025-03-19 RX ORDER — CEFAZOLIN SODIUM/WATER 2 G/20 ML
2 SYRINGE (ML) INTRAVENOUS
OUTPATIENT
Start: 2025-03-19

## 2025-03-19 RX ORDER — MISOPROSTOL 200 UG/1
800 TABLET ORAL
OUTPATIENT
Start: 2025-03-19

## 2025-03-19 RX ORDER — METHYLERGONOVINE MALEATE 0.2 MG/ML
200 INJECTION INTRAVENOUS
OUTPATIENT
Start: 2025-03-19

## 2025-03-19 RX ORDER — OXYTOCIN/0.9 % SODIUM CHLORIDE 30/500 ML
100-340 PLASTIC BAG, INJECTION (ML) INTRAVENOUS CONTINUOUS PRN
OUTPATIENT
Start: 2025-03-19

## 2025-03-19 RX ORDER — OXYTOCIN/0.9 % SODIUM CHLORIDE 30/500 ML
340 PLASTIC BAG, INJECTION (ML) INTRAVENOUS CONTINUOUS PRN
OUTPATIENT
Start: 2025-03-19

## 2025-03-19 RX ORDER — SODIUM CHLORIDE, SODIUM LACTATE, POTASSIUM CHLORIDE, CALCIUM CHLORIDE 600; 310; 30; 20 MG/100ML; MG/100ML; MG/100ML; MG/100ML
INJECTION, SOLUTION INTRAVENOUS CONTINUOUS
OUTPATIENT
Start: 2025-03-19

## 2025-03-19 RX ORDER — CARBOPROST TROMETHAMINE 250 UG/ML
250 INJECTION, SOLUTION INTRAMUSCULAR
OUTPATIENT
Start: 2025-03-19

## 2025-03-19 RX ORDER — OXYTOCIN 10 [USP'U]/ML
10 INJECTION, SOLUTION INTRAMUSCULAR; INTRAVENOUS
OUTPATIENT
Start: 2025-03-19

## 2025-03-19 RX ORDER — MISOPROSTOL 200 UG/1
400 TABLET ORAL
OUTPATIENT
Start: 2025-03-19

## 2025-03-19 RX ORDER — LIDOCAINE 40 MG/G
CREAM TOPICAL
OUTPATIENT
Start: 2025-03-19

## 2025-03-19 RX ORDER — ACETAMINOPHEN 325 MG/1
975 TABLET ORAL ONCE
OUTPATIENT
Start: 2025-03-19 | End: 2025-03-19

## 2025-03-19 RX ORDER — TRANEXAMIC ACID 10 MG/ML
1 INJECTION, SOLUTION INTRAVENOUS EVERY 30 MIN PRN
OUTPATIENT
Start: 2025-03-19

## 2025-03-19 NOTE — NURSING NOTE
"Initial /59 (BP Location: Right arm, Patient Position: Sitting, Cuff Size: Adult Large)   Pulse 97   Resp 18   Ht 1.549 m (5' 1\")   Wt 95.8 kg (211 lb 3.2 oz)   LMP 07/16/2024   BMI 39.91 kg/m   Estimated body mass index is 39.91 kg/m  as calculated from the following:    Height as of this encounter: 1.549 m (5' 1\").    Weight as of this encounter: 95.8 kg (211 lb 3.2 oz). .    "

## 2025-03-19 NOTE — PROGRESS NOTES
"[facility]    Return OB Note    CC: Return OB     Subjective:  Asmita is a 35 year old  at 32w5d   Denies vaginal bleeding, loss of fluid, or regular contractions. Good fetal movement.  Complaints today: none    Objective:  /59 (BP Location: Right arm, Patient Position: Sitting, Cuff Size: Adult Large)   Pulse 97   Resp 18   Ht 1.549 m (5' 1\")   Wt 95.8 kg (211 lb 3.2 oz)   LMP 2024   BMI 39.91 kg/m      Fundal height: 36cm  FHT: 140bpm    Assessment/Plan:   Encounter Diagnoses   Name Primary?    Prenatal care, subsequent pregnancy in third trimester Yes    Obesity, Class II, BMI 35-39.9     Multigravida of advanced maternal age in third trimester     History of  section     Insulin controlled gestational diabetes mellitus (GDM) in third trimester     Class 2 obesity without serious comorbidity with body mass index (BMI) of 38.0 to 38.9 in adult, unspecified obesity type        IUP at 32w5d  -NOB labs WNL  -Prenatal screening: declines  -Anatomy US: L2 WNL  -Mid trimester labs: gestational diabetes, anemia  -Planning to breast feed, has a pump     Co-Morbidities/Complications/Concerns:   -S>D: growth US scheduled for tomorrow. Planning to schedule BPPs while she is there  -GDMA2: started on insulin, NPH at night, continuing to titrate dosage. Will continue to monitor and assess insulin requirements. Will need serial growth US and twice weekly BPPs at 32 weeks, ordered. Plan delivery at 39 weeks.   -Iron deficiency anemia: taking PO iron, plan recheck HB today  -Hx C section x3: plan C section with tubal for delivery,  with KB.   -AMA: S/p L2  -Obesity: BMI 38.Plan growth US at 32 weeks, BPPs at 37 weeks.   -2 risk factors for pre-e: recommend ASA   -Strict return precautions given    RTC 2 weeks    Melodie Sesay DO    "

## 2025-03-20 ENCOUNTER — VIRTUAL VISIT (OUTPATIENT)
Dept: EDUCATION SERVICES | Facility: CLINIC | Age: 35
End: 2025-03-20
Payer: COMMERCIAL

## 2025-03-20 ENCOUNTER — TELEPHONE (OUTPATIENT)
Dept: OBGYN | Facility: CLINIC | Age: 35
End: 2025-03-20

## 2025-03-20 ENCOUNTER — HOSPITAL ENCOUNTER (OUTPATIENT)
Dept: ULTRASOUND IMAGING | Facility: CLINIC | Age: 35
Discharge: HOME | End: 2025-03-20
Attending: OBSTETRICS & GYNECOLOGY
Payer: COMMERCIAL

## 2025-03-20 DIAGNOSIS — O24.414 GESTATIONAL DIABETES MELLITUS (GDM) REQUIRING INSULIN: ICD-10-CM

## 2025-03-20 DIAGNOSIS — O24.414 INSULIN CONTROLLED GESTATIONAL DIABETES MELLITUS (GDM) IN THIRD TRIMESTER: Primary | ICD-10-CM

## 2025-03-20 DIAGNOSIS — E66.812 OBESITY, CLASS II, BMI 35-39.9: ICD-10-CM

## 2025-03-20 PROCEDURE — 76816 OB US FOLLOW-UP PER FETUS: CPT

## 2025-03-20 NOTE — LETTER
3/20/2025         RE: Asmita Frye  [address] [facility] [address]        Dear Colleague,    Thank you for referring your patient, Asmita Frye, to the [facility]. Please see a copy of my visit note below.    Diabetes and Pregnancy Follow-up  Type of Service: Video Visit/ 13 minutes     Originating Location (Patient Location): Home  Distant Location (Provider Location): [place] - [facility]  Mode of Communication:  Telephone     Telephone Visit Start Time: 9:35 AM  Telephone Visit End Time: 9:48 AM     How would patient like to obtain AVS? MyChart    Subjective/Objective:    Asmita Frye was called for a scheduled BG review. Last date of communication was: 3/17/25.    Gestational diabetes is being managed with activity and medications    Taking diabetes medications: yes:     Diabetes Medication(s)       Insulin       insulin  UNIT/ML vial Inject 18 Units subcutaneously daily. Max dose: 24 units daily.          Estimated Date of Delivery: May 9, 2025    Blood Glucose/Ketone Log:    Date Ketones Fasting Post Breakfast Post Lunch Post Supper   3/17   131 112 122   3/18 negative 91 127 112 110   3/19 negative 102 104 118 114   3/20 negative 96      Testing 1 hour after meals    Assessment:  Ketones: negative x3.   Fasting blood glucoses: 33% in target.  After breakfast: 100% in target.  Before lunch: n/a% in target.  After lunch: 100% in target.  Before dinner: n/a% in target.  After dinner: 100% in target.    Much improved post meal glucoses in the past 3 days.  Patient reports she has been incorporating some walking and is being especially diligent about washing hands before checking glucose.     Has been using Humulin NPH and when she picked up a new vial was given Novolin NPH.  Discussed Humulin NPH and Novolin NPH are the same product made by different manufacturers. Instructed to only take one per night.  Reviewed length of time each vial can be at room  temperature as below.    Plan/Response:  Patient Instructions   Test glucose 4 times per day:              Fasting (when you first awake for the day): below 95 mg/dL               1 hour after breakfast: below 140 mg/dL               1 hour after lunch: below 140 mg/dL               1 hour after dinner: below 140 mg/dL                  Please bring your meter and log book to all appointments                 If you miss 1 hour after meal test, test 2 hours after the meal.  Goal 2 hours after is below 120 mg/dL     2.  Check your urine ketones once a day, when you first awake for the day until they are negative to trace for 7 days in a row.  Then decrease and check once a week.      3.  Try to walk for 5-10 minutes following each meal.  If weather not amenable, stay upright - walk the dishes, vacuum, etc.  Make sure your OB considers the activity you choose safe.      4. Increase NPH to 21 units once a day at bedtime   A vial of Humulin NPH can be at room temperature for 31 days   A vial of Novolin NPH can be at room temperature for 42 days     5.  Follow up: virtual with Marisol on Monday, March 24th at 9:30 AM     6.  Keep something with you for treating a low blood sugar (below 60-80 mg/dL).  If your blood sugar is low, eat/drink ONE of the following:   -1/2 cup juice  -1/2 cup regular soda  -1 package fruit snacks  -4 glucose tablets  Then, wait 15 minutes and re-check blood sugar.  If it is not above 70 mg/dL, repeat the above.     7.  Call Diabetes Education at [phone] or send a [employer] message with:              -questions or concerns              -ketones that are small, moderate, or large              -3 or more blood sugars above target in a 7 day period              -any low blood sugars      Marisol Strange, MPH, RD, CDCES, LD 3/20/2025    Time Spent: 13 minutes    Any diabetes medication dose changes were made via the CDE Protocol and Collaborative Practice Agreement with the patient's referring provider.  A copy of this encounter was shared with the provider.

## 2025-03-20 NOTE — PATIENT INSTRUCTIONS
Test glucose 4 times per day:              Fasting (when you first awake for the day): below 95 mg/dL               1 hour after breakfast: below 140 mg/dL               1 hour after lunch: below 140 mg/dL               1 hour after dinner: below 140 mg/dL                  Please bring your meter and log book to all appointments                 If you miss 1 hour after meal test, test 2 hours after the meal.  Goal 2 hours after is below 120 mg/dL     2.  Check your urine ketones once a day, when you first awake for the day until they are negative to trace for 7 days in a row.  Then decrease and check once a week.      3.  Try to walk for 5-10 minutes following each meal.  If weather not amenable, stay upright - walk the dishes, vacuum, etc.  Make sure your OB considers the activity you choose safe.      4. Increase NPH to 21 units once a day at bedtime   A vial of Humulin NPH can be at room temperature for 31 days   A vial of Novolin NPH can be at room temperature for 42 days     5.  Follow up: virtual with Marisol on Monday, March 24th at 9:30 AM     6.  Keep something with you for treating a low blood sugar (below 60-80 mg/dL).  If your blood sugar is low, eat/drink ONE of the following:   -1/2 cup juice  -1/2 cup regular soda  -1 package fruit snacks  -4 glucose tablets  Then, wait 15 minutes and re-check blood sugar.  If it is not above 70 mg/dL, repeat the above.     7.  Call Diabetes Education at [phone] or send a [employer] message with:              -questions or concerns              -ketones that are small, moderate, or large              -3 or more blood sugars above target in a 7 day period              -any low blood sugars

## 2025-03-20 NOTE — TELEPHONE ENCOUNTER
"[id number]  Asmita Frye    You are now scheduled for surgery at [facility].  Below are the details for your surgery.  Please read the \"Preparing for Your Surgery\" instructions and let us know if you have any questions.    Type of surgery: REPEAT  SECTION, WITH BILATERAL SALPINGECTOMY   Surgeon:  Melodie Sesay DO  Location of surgery: [facility] OR    Date of surgery: 25    Time: 7:30am   Arrival Time: 6:00am    Time can change, to be confirmed a couple of days prior by pre-op surgery nurse.    Pre-Op Appt Date: Patient to schedule with a PCP or Family Practice Provider within 30 days to the surgery.  Post-Op Appt Date:    Time:     Packet sent out: Yes at next appt  Pre-cert/Authorization completed:  TBD by Financial Securing Office.   MA Sterilization/Hysterectomy Acknowledgment Consent signed: Yes 3/19/25    [facility]  [phone]    Fax: [phone]  Same Day Surgery [phone]  Fax: [phone]  [facility] [phone]    "

## 2025-03-20 NOTE — PROGRESS NOTES
Diabetes and Pregnancy Follow-up  Type of Service: Video Visit/ 13 minutes     Originating Location (Patient Location): Home  Distant Location (Provider Location): [place] - [facility]  Mode of Communication:  Telephone  [facility]  used for the duration of the visit.     Telephone Visit Start Time: 9:35 AM  Telephone Visit End Time: 9:48 AM     How would patient like to obtain AVS? MyChart    Subjective/Objective:    Asmita Frye was called for a scheduled BG review. Last date of communication was: 3/17/25.    Gestational diabetes is being managed with activity and medications    Taking diabetes medications: yes:     Diabetes Medication(s)       Insulin       insulin  UNIT/ML vial Inject 18 Units subcutaneously daily. Max dose: 24 units daily.          Estimated Date of Delivery: May 9, 2025    Blood Glucose/Ketone Log:    Date Ketones Fasting Post Breakfast Post Lunch Post Supper   3/17   131 112 122   3/18 negative 91 127 112 110   3/19 negative 102 104 118 114   3/20 negative 96      Testing 1 hour after meals    Assessment:  Ketones: negative x3.   Fasting blood glucoses: 33% in target.  After breakfast: 100% in target.  Before lunch: n/a% in target.  After lunch: 100% in target.  Before dinner: n/a% in target.  After dinner: 100% in target.    Much improved post meal glucoses in the past 3 days.  Patient reports she has been incorporating some walking and is being especially diligent about washing hands before checking glucose.     Has been using Humulin NPH and when she picked up a new vial was given Novolin NPH.  Discussed Humulin NPH and Novolin NPH are the same product made by different manufacturers. Instructed to only take one per night.  Reviewed length of time each vial can be at room temperature as below.    Plan/Response:  Patient Instructions   Test glucose 4 times per day:              Fasting (when you first awake for the day): below 95 mg/dL                1 hour after breakfast: below 140 mg/dL               1 hour after lunch: below 140 mg/dL               1 hour after dinner: below 140 mg/dL                  Please bring your meter and log book to all appointments                 If you miss 1 hour after meal test, test 2 hours after the meal.  Goal 2 hours after is below 120 mg/dL     2.  Check your urine ketones once a day, when you first awake for the day until they are negative to trace for 7 days in a row.  Then decrease and check once a week.      3.  Try to walk for 5-10 minutes following each meal.  If weather not amenable, stay upright - walk the dishes, vacuum, etc.  Make sure your OB considers the activity you choose safe.      4. Increase NPH to 21 units once a day at bedtime   A vial of Humulin NPH can be at room temperature for 31 days   A vial of Novolin NPH can be at room temperature for 42 days     5.  Follow up: virtual with Marisol on Monday, March 24th at 9:30 AM     6.  Keep something with you for treating a low blood sugar (below 60-80 mg/dL).  If your blood sugar is low, eat/drink ONE of the following:   -1/2 cup juice  -1/2 cup regular soda  -1 package fruit snacks  -4 glucose tablets  Then, wait 15 minutes and re-check blood sugar.  If it is not above 70 mg/dL, repeat the above.     7.  Call Diabetes Education at [phone] or send a [employer] message with:              -questions or concerns              -ketones that are small, moderate, or large              -3 or more blood sugars above target in a 7 day period              -any low blood sugars      Marisol Strange, MPH, RD, CDCES, LD 3/20/2025    Time Spent: 13 minutes    Any diabetes medication dose changes were made via the CDE Protocol and Collaborative Practice Agreement with the patient's referring provider. A copy of this encounter was shared with the provider.

## 2025-03-24 ENCOUNTER — VIRTUAL VISIT (OUTPATIENT)
Dept: EDUCATION SERVICES | Facility: CLINIC | Age: 35
End: 2025-03-24
Payer: COMMERCIAL

## 2025-03-24 DIAGNOSIS — O24.414 INSULIN CONTROLLED GESTATIONAL DIABETES MELLITUS (GDM) IN THIRD TRIMESTER: Primary | ICD-10-CM

## 2025-03-24 PROCEDURE — 99207 PR NO BILLABLE SERVICE THIS VISIT: CPT | Mod: 95

## 2025-03-24 PROCEDURE — T1013 SIGN LANG/ORAL INTERPRETER: HCPCS | Mod: U4

## 2025-03-24 NOTE — PATIENT INSTRUCTIONS
Test glucose 4 times per day:              Fasting (when you first awake for the day): below 95 mg/dL               1 hour after breakfast: below 140 mg/dL               1 hour after lunch: below 140 mg/dL               1 hour after dinner: below 140 mg/dL                  Please bring your meter and log book to all appointments                 If you miss 1 hour after meal test, test 2 hours after the meal.  Goal 2 hours after is below 120 mg/dL     2.  Check your urine ketones once a week, when you first awake for the day.  Goal is negative to trace.     3.  Food/Meal Planning:   -tamales: have just one if consuming at breakfast; okay to have 2 at lunch or supper   -can add more protein (chicken, beef, eggs, cheese, etc) and non-starchy vegetables (lettuce, tomato, onion, bell peppers, etc) to any meal to help feel more full    4. Continue NPH as 21 units once a day at bedtime              5.  Follow up: virtual with Marisol on Thursday, March 27th at 7:30 AM     6.  Keep something with you for treating a low blood sugar (below 60-80 mg/dL).  If your blood sugar is low, eat/drink ONE of the following:   -1/2 cup juice  -1/2 cup regular soda  -1 package fruit snacks  -4 glucose tablets  Then, wait 15 minutes and re-check blood sugar.  If it is not above 70 mg/dL, repeat the above.     7.  Call Diabetes Education at [phone] or send a [employer] message with:              -questions or concerns              -ketones that are small, moderate, or large              -3 or more blood sugars above target in a 7 day period              -any low blood sugars

## 2025-03-24 NOTE — LETTER
3/24/2025         RE: Asmita Frye  [address] [facility] [address]        Dear Colleague,    Thank you for referring your patient, Asmita Frye, to the [facility]. Please see a copy of my visit note below.    Diabetes and Pregnancy Follow-up  Type of Service: Telephone Visit/ 15 minutes     Originating Location (Patient Location): Home  Distant Location (Provider Location): Home - [facility]  Mode of Communication:  Telephone     Telephone Visit Start Time: 9:35 AM  Telephone Visit End Time (telephone visit stop time): 9:50 AM  [facility]  used for the duration of the visit     How would patient like to obtain AVS? MyChart      Subjective/Objective:    Asmita Frye was called for a scheduled BG review. Last date of communication was: 3/20/25.    Gestational diabetes is being managed with medications    Taking diabetes medications: yes:     Diabetes Medication(s)       Insulin       insulin  UNIT/ML vial Inject 21 Units subcutaneously daily. Max dose: 29 units daily.            Estimated Date of Delivery: May 9, 2025    Blood Glucose/Ketone Log:    Date Ketones Fasting Post Breakfast Post Lunch Post Supper   3/20   120 121 92   3/21 negative 89 141 145 109   3/22 negative 90 159 (coffee, 2 small tamales) 165 (soup (fried noodles, tomato), torres taco (fried wheat tortilla with chicken), 2 corn tortillas with beans and meat) 136   3/23 negative 92 120 112 115   3/24 negative 85      Testing 1 hour post meals    Assessment:  Ketones: negative x4.   Fasting blood glucoses: 100% in target.  After breakfast: 50% in target (71% in target in the past 7 days)  Before lunch: n/a% in target.  After lunch: 50% in target (71% in target in the past 7 days).  Before dinner: n/a% in target.  After dinner: 100% in target.    Patient with no questions/concerns.  All fasting glucoses in target over the past 4 days!  Therefore, no change to HS NPH dose  at this time.  Provided specific suggestions regarding adjustments to carbohydrate portions she could make to the meals she recalled when post meal glucose was elevated (see below).  At the lunch meal suggested patient choose soup with 2 corn tortillas with beans and meat OR torres taco with 2 corn tortillas with beans and meat.    Plan/Response:  Patient Instructions   Test glucose 4 times per day:              Fasting (when you first awake for the day): below 95 mg/dL               1 hour after breakfast: below 140 mg/dL               1 hour after lunch: below 140 mg/dL               1 hour after dinner: below 140 mg/dL                  Please bring your meter and log book to all appointments                 If you miss 1 hour after meal test, test 2 hours after the meal.  Goal 2 hours after is below 120 mg/dL     2.  Check your urine ketones once a week, when you first awake for the day.  Goal is negative to trace.     3.  Food/Meal Planning:   -tamales: have just one if consuming at breakfast; okay to have 2 at lunch or supper   -can add more protein (chicken, beef, eggs, cheese, etc) and non-starchy vegetables (lettuce, tomato, onion, bell peppers, etc) to any meal to help feel more full    4. Continue NPH as 21 units once a day at bedtime              5.  Follow up: virtual with Marisol on Thursday, March 27th at 7:30 AM     6.  Keep something with you for treating a low blood sugar (below 60-80 mg/dL).  If your blood sugar is low, eat/drink ONE of the following:   -1/2 cup juice  -1/2 cup regular soda  -1 package fruit snacks  -4 glucose tablets  Then, wait 15 minutes and re-check blood sugar.  If it is not above 70 mg/dL, repeat the above.     7.  Call Diabetes Education at [phone] or send a [employer] message with:              -questions or concerns              -ketones that are small, moderate, or large              -3 or more blood sugars above target in a 7 day period              -any low blood  tommie Strange, MPH, RD, CDCES, LD 3/24/2025  Time Spent: 15 minutes    Any diabetes medication dose changes were made via the CDE Protocol and Collaborative Practice Agreement with the patient's referring provider. A copy of this encounter was shared with the provider.

## 2025-03-24 NOTE — PROGRESS NOTES
Diabetes and Pregnancy Follow-up  Type of Service: Telephone Visit/ 15 minutes     Originating Location (Patient Location): Home  Distant Location (Provider Location): [facility] [place]  Mode of Communication:  Telephone     Telephone Visit Start Time: 9:35 AM  Telephone Visit End Time (telephone visit stop time): 9:50 AM   [employer] Interpreter used for the duration of the visit     How would patient like to obtain AVS? Leilani      Subjective/Objective:    Asmita Frye was called for a scheduled BG review. Last date of communication was: 3/20/25.    Gestational diabetes is being managed with medications    Taking diabetes medications: yes:     Diabetes Medication(s)       Insulin       insulin  UNIT/ML vial Inject 21 Units subcutaneously daily. Max dose: 29 units daily.            Estimated Date of Delivery: May 9, 2025    Blood Glucose/Ketone Log:    Date Ketones Fasting Post Breakfast Post Lunch Post Supper   3/20   120 121 92   3/21 negative 89 141 145 109   3/22 negative 90 159 (coffee, 2 small tamales) 165 (soup (fried noodles, tomato), torres taco (fried wheat tortilla with chicken), 2 corn tortillas with beans and meat) 136   3/23 negative 92 120 112 115   3/24 negative 85      Testing 1 hour post meals    Assessment:  Ketones: negative x4.   Fasting blood glucoses: 100% in target.  After breakfast: 50% in target (71% in target in the past 7 days)  Before lunch: n/a% in target.  After lunch: 50% in target (71% in target in the past 7 days).  Before dinner: n/a% in target.  After dinner: 100% in target.    Patient with no questions/concerns.  All fasting glucoses in target over the past 4 days!  Therefore, no change to HS NPH dose at this time.  Provided specific suggestions regarding adjustments to carbohydrate portions she could make to the meals she recalled when post meal glucose was elevated (see below).  At the lunch meal suggested patient choose soup with 2 corn  tortillas with beans and meat OR torres taco with 2 corn tortillas with beans and meat.    Plan/Response:  Patient Instructions   Test glucose 4 times per day:              Fasting (when you first awake for the day): below 95 mg/dL               1 hour after breakfast: below 140 mg/dL               1 hour after lunch: below 140 mg/dL               1 hour after dinner: below 140 mg/dL                  Please bring your meter and log book to all appointments                 If you miss 1 hour after meal test, test 2 hours after the meal.  Goal 2 hours after is below 120 mg/dL     2.  Check your urine ketones once a week, when you first awake for the day.  Goal is negative to trace.     3.  Food/Meal Planning:   -tamales: have just one if consuming at breakfast; okay to have 2 at lunch or supper   -can add more protein (chicken, beef, eggs, cheese, etc) and non-starchy vegetables (lettuce, tomato, onion, bell peppers, etc) to any meal to help feel more full    4. Continue NPH as 21 units once a day at bedtime              5.  Follow up: virtual with Marisol on Thursday, March 27th at 7:30 AM     6.  Keep something with you for treating a low blood sugar (below 60-80 mg/dL).  If your blood sugar is low, eat/drink ONE of the following:   -1/2 cup juice  -1/2 cup regular soda  -1 package fruit snacks  -4 glucose tablets  Then, wait 15 minutes and re-check blood sugar.  If it is not above 70 mg/dL, repeat the above.     7.  Call Diabetes Education at [phone] or send a [employer] message with:              -questions or concerns              -ketones that are small, moderate, or large              -3 or more blood sugars above target in a 7 day period              -any low blood sugars     Marisol Strange, MPH, RD, CDCES, LD 3/24/2025  Time Spent: 15 minutes    Any diabetes medication dose changes were made via the CDE Protocol and Collaborative Practice Agreement with the patient's referring provider. A copy of this encounter  was shared with the provider.

## 2025-03-26 ENCOUNTER — HOSPITAL ENCOUNTER (OUTPATIENT)
Dept: ULTRASOUND IMAGING | Facility: CLINIC | Age: 35
Discharge: HOME | End: 2025-03-26
Attending: OBSTETRICS & GYNECOLOGY
Payer: COMMERCIAL

## 2025-03-26 ENCOUNTER — HOSPITAL ENCOUNTER (OUTPATIENT)
Facility: CLINIC | Age: 35
Discharge: HOME | End: 2025-03-26
Attending: OBSTETRICS & GYNECOLOGY | Admitting: OBSTETRICS & GYNECOLOGY
Payer: COMMERCIAL

## 2025-03-26 VITALS — SYSTOLIC BLOOD PRESSURE: 122 MMHG | DIASTOLIC BLOOD PRESSURE: 78 MMHG | RESPIRATION RATE: 16 BRPM | TEMPERATURE: 97.9 F

## 2025-03-26 DIAGNOSIS — O24.414 INSULIN CONTROLLED GESTATIONAL DIABETES MELLITUS (GDM) IN THIRD TRIMESTER: ICD-10-CM

## 2025-03-26 PROBLEM — Z36.89 ENCOUNTER FOR TRIAGE IN PREGNANT PATIENT: Status: ACTIVE | Noted: 2025-03-26

## 2025-03-26 PROCEDURE — 59025 FETAL NON-STRESS TEST: CPT

## 2025-03-26 PROCEDURE — 76819 FETAL BIOPHYS PROFIL W/O NST: CPT

## 2025-03-26 PROCEDURE — G0463 HOSPITAL OUTPT CLINIC VISIT: HCPCS | Mod: 25

## 2025-03-26 RX ORDER — LIDOCAINE 40 MG/G
CREAM TOPICAL
Status: DISCONTINUED | OUTPATIENT
Start: 2025-03-26 | End: 2025-03-26 | Stop reason: HOSPADM

## 2025-03-26 ASSESSMENT — ACTIVITIES OF DAILY LIVING (ADL)
ADLS_ACUITY_SCORE: 50
ADLS_ACUITY_SCORE: 50

## 2025-03-27 ENCOUNTER — VIRTUAL VISIT (OUTPATIENT)
Dept: EDUCATION SERVICES | Facility: CLINIC | Age: 35
End: 2025-03-27
Payer: COMMERCIAL

## 2025-03-27 DIAGNOSIS — O24.414 INSULIN CONTROLLED GESTATIONAL DIABETES MELLITUS (GDM) IN THIRD TRIMESTER: Primary | ICD-10-CM

## 2025-03-27 NOTE — PROGRESS NOTES
S: Discharge from triage  A: A:moderate variablility, + accels, no decels, Category I  Occasional ctx, not felt per pt report   Strip reviewed by Yessica Che RN.  not examined  No visits with results within 1 Day(s) from this visit.   Latest known visit with results is:   Prenatal Office Visit on 03/19/2025   Component Date Value    WBC Count 03/19/2025 7.4     RBC Count 03/19/2025 3.58 (L)     Hemoglobin 03/19/2025 11.0 (L)     Hematocrit 03/19/2025 33.0 (L)     MCV 03/19/2025 92     MCH 03/19/2025 30.7     MCHC 03/19/2025 33.3     RDW 03/19/2025 14.5     Platelet Count 03/19/2025 282      Dr. ROSALIO Lennon informed of above and discharge order received.   R: Plan includes: Fetal kick count instructions given. Patient instructed to report any recurrence of above concerns to her primary care provider during clinic hours or [facility] at any other time. Patient verbalized understanding of After Visit Summary, education and agreement with plan. Agrees to call for any problems, questions or concerns.  Discharged undelivered via ambulatory  in stable condition with all belongings. Accompanied by Spouse and children.

## 2025-03-27 NOTE — PROGRESS NOTES
Gestational Diabetes Follow-up  Type of Service: Video Visit/ 7 minutes     Originating Location (Patient Location): Home  Distant Location (Provider Location): [facility] [place]  Mode of Communication:  Video  Mohawk phone  #[id number] was used for the duration of the visit     Visit Start Time: 7:38 AM  Visit End Time: 7:45 AM     How would patient like to obtain AVS? Gideonharparrish    Subjective/Objective:    Asmita Frye connected for blood glucose log for review. Last date of communication was: 3/24/25.    Gestational diabetes is being managed with medications    Taking diabetes medications: yes:     Diabetes Medication(s)       Insulin       insulin  UNIT/ML vial Inject 21 Units subcutaneously daily. Max dose: 29 units daily.            Estimated Date of Delivery: May 9, 2025    BG/Food Log:   Date Fasting blood sugar 1 hour post brkfst 1 hour post lunch 1 hour post dinner   3/24  118 111 122   3/25 95 118 101 120   3/26 88 120 130 131   3/27 90         Assessment:  Ketones: none reported.   Fasting blood glucoses: 67% in target.  After breakfast: 100% in target.  Before lunch: n/a% in target.  After lunch: 100% in target.  Before dinner: n/a% in target.  After dinner: 100% in target.    Patient with no questions/concerns.  Patient prefers to continue twice weekly video visits for assessment and insulin adjustment; declines instruction on how to independently adjust insulin.     Plan/Response:  Patient Instructions   Test glucose 4 times per day:              Fasting (when you first awake for the day): below 95 mg/dL               1 hour after breakfast: below 140 mg/dL               1 hour after lunch: below 140 mg/dL               1 hour after dinner: below 140 mg/dL                  Please bring your meter and log book to all appointments                 If you miss 1 hour after meal test, test 2 hours after the meal.  Goal 2 hours after is below 120 mg/dL     2.  Check your urine ketones  once a week, when you first awake for the day.  Goal is negative to trace.     3.   Continue NPH as 21 units once a day at bedtime              4.  Follow up: virtual with Marisol on Monday, March 31st at 7:30 AM     5.  Keep something with you for treating a low blood sugar (below 60-80 mg/dL).  If your blood sugar is low, eat/drink ONE of the following:   -1/2 cup juice  -1/2 cup regular soda  -1 package fruit snacks  -4 glucose tablets  Then, wait 15 minutes and re-check blood sugar.  If it is not above 70 mg/dL, repeat the above.     6.  Call Diabetes Education at [phone] or send a "[employer]" message with:              -questions or concerns              -ketones that are small, moderate, or large              -3 or more blood sugars above target in a 7 day period              -any low blood sugars      Marisol Strange, MPH, RD, CDCES, LD 3/27/2025    Any diabetes medication dose changes were made via the CDE Protocol and Collaborative Practice Agreement with the patient's referring provider. A copy of this encounter was shared with the provider..

## 2025-03-27 NOTE — DISCHARGE INSTRUCTIONS
Discharge Instructions for Undelivered Patients  [facility] [phone]    Diet:  Drink 8 to 12 glasses of water every day.  You may eat meals and snacks as before    Activity:  If you are experiencing  labor, rest the pelvic area. No sex. Do not stimulate breasts or nipples.  Rest often as needed.  Count fetal kicks every day. (See handout.)  Call your doctor if your baby is moving less than usual.    Medicines:  My care team has reviewed my medicines with me.  My care team has given me a list of my medicines.  My care team has prescribed a new medicine. They have either sent it home with me or ordered it from the pharmacy.    Call your provider if you notice:  Swelling in your face or increased swelling in your hands or legs.  Headaches that are not relieved by Tylenol (acetaminophen).  Changes in your vision (blurring; seeing spots or stars).  Nausea (sick to your stomach) and vomiting (throwing up).  Weight gain of 5 pounds per week.  Heartburn that doesn't go away.  Signs of bladder infection: pain when you urinate (use the toilet), needing to go more often or more urgently.  The bag of cates (membranes) breaks, or you notice leaking in your underwear.  Bright red blood in your underwear.  Abdominal (lower belly) or stomach pain.  For first baby: Contractions (tightenings) less than 5 minutes apart for one hour or more.  For Second (plus) baby: Contractions (tightenings) less than 10 minutes apart and getting stronger.  Increase or change in vaginal discharge (note the color and amount).    Follow up with your provider as scheduled.

## 2025-03-27 NOTE — PATIENT INSTRUCTIONS
Test glucose 4 times per day:              Fasting (when you first awake for the day): below 95 mg/dL               1 hour after breakfast: below 140 mg/dL               1 hour after lunch: below 140 mg/dL               1 hour after dinner: below 140 mg/dL                  Please bring your meter and log book to all appointments                 If you miss 1 hour after meal test, test 2 hours after the meal.  Goal 2 hours after is below 120 mg/dL     2.  Check your urine ketones once a week, when you first awake for the day.  Goal is negative to trace.     3.   Continue NPH as 21 units once a day at bedtime              4.  Follow up: virtual with Marisol on Monday, March 31st at 7:30 AM     5.  Keep something with you for treating a low blood sugar (below 60-80 mg/dL).  If your blood sugar is low, eat/drink ONE of the following:   -1/2 cup juice  -1/2 cup regular soda  -1 package fruit snacks  -4 glucose tablets  Then, wait 15 minutes and re-check blood sugar.  If it is not above 70 mg/dL, repeat the above.     6.  Call Diabetes Education at [phone] or send a [employer] message with:              -questions or concerns              -ketones that are small, moderate, or large              -3 or more blood sugars above target in a 7 day period              -any low blood sugars

## 2025-03-27 NOTE — LETTER
3/27/2025         RE: Asmita Frye  [address] [facility] [address]        Dear Colleague,    Thank you for referring your patient, Asmita Frye, to the [facility]. Please see a copy of my visit note below.    Gestational Diabetes Follow-up  Type of Service: Video Visit/ 7 minutes     Originating Location (Patient Location): Home  Distant Location (Provider Location): [place] [facility]  Mode of Communication:  Video  [employer]arusian phone  #[id number] was used for the duration of the visit     Visit Start Time: 7:38 AM  Visit End Time: 7:45 AM     How would patient like to obtain AVS? MyChart    Subjective/Objective:    Asmita Frye connected for blood glucose log for review. Last date of communication was: 3/24/25.    Gestational diabetes is being managed with medications    Taking diabetes medications: yes:     Diabetes Medication(s)       Insulin       insulin  UNIT/ML vial Inject 21 Units subcutaneously daily. Max dose: 29 units daily.            Estimated Date of Delivery: May 9, 2025    BG/Food Log:   Date Fasting blood sugar 1 hour post brkfst 1 hour post lunch 1 hour post dinner   3/24  118 111 122   3/25 95 118 101 120   3/26 88 120 130 131   3/27 90         Assessment:  Ketones: none reported.   Fasting blood glucoses: 67% in target.  After breakfast: 100% in target.  Before lunch: n/a% in target.  After lunch: 100% in target.  Before dinner: n/a% in target.  After dinner: 100% in target.    Patient with no questions/concerns.  Patient prefers to continue twice weekly video visits for assessment and insulin adjustment; declines instruction on how to independently adjust insulin.     Plan/Response:  Patient Instructions   Test glucose 4 times per day:              Fasting (when you first awake for the day): below 95 mg/dL               1 hour after breakfast: below 140 mg/dL               1 hour after lunch: below 140 mg/dL               1 hour after  dinner: below 140 mg/dL                  Please bring your meter and log book to all appointments                 If you miss 1 hour after meal test, test 2 hours after the meal.  Goal 2 hours after is below 120 mg/dL     2.  Check your urine ketones once a week, when you first awake for the day.  Goal is negative to trace.     3.   Continue NPH as 21 units once a day at bedtime              4.  Follow up: virtual with Marisol on Monday, March 31st at 7:30 AM     5.  Keep something with you for treating a low blood sugar (below 60-80 mg/dL).  If your blood sugar is low, eat/drink ONE of the following:   -1/2 cup juice  -1/2 cup regular soda  -1 package fruit snacks  -4 glucose tablets  Then, wait 15 minutes and re-check blood sugar.  If it is not above 70 mg/dL, repeat the above.     6.  Call Diabetes Education at [phone] or send a [employer] message with:              -questions or concerns              -ketones that are small, moderate, or large              -3 or more blood sugars above target in a 7 day period              -any low blood sugars      Marisol Strange, MPH, RD, CDCES, LD 3/27/2025    Any diabetes medication dose changes were made via the CDE Protocol and Collaborative Practice Agreement with the patient's referring provider. A copy of this encounter was shared with the provider..

## 2025-03-27 NOTE — PLAN OF CARE
S:Patient presents due to failed BPP, needing NST.  B:33w5d   Allergies: Patient has no known allergies.  A:A:moderate variablility, + accels, no decels, Category I  normal uterine activity    Dr. Lennon in department and orders received.  Plan includes; NST. Reviewed with patient and she agrees with plan.     Oriented to room and call light.

## 2025-03-28 ENCOUNTER — HOSPITAL ENCOUNTER (OUTPATIENT)
Dept: ULTRASOUND IMAGING | Facility: CLINIC | Age: 35
Discharge: HOME | End: 2025-03-28
Attending: OBSTETRICS & GYNECOLOGY | Admitting: OBSTETRICS & GYNECOLOGY
Payer: COMMERCIAL

## 2025-03-28 DIAGNOSIS — O24.414 INSULIN CONTROLLED GESTATIONAL DIABETES MELLITUS (GDM) IN THIRD TRIMESTER: ICD-10-CM

## 2025-03-28 PROCEDURE — 76819 FETAL BIOPHYS PROFIL W/O NST: CPT

## 2025-03-31 ENCOUNTER — VIRTUAL VISIT (OUTPATIENT)
Dept: EDUCATION SERVICES | Facility: CLINIC | Age: 35
End: 2025-03-31
Payer: COMMERCIAL

## 2025-03-31 DIAGNOSIS — O24.414 INSULIN CONTROLLED GESTATIONAL DIABETES MELLITUS (GDM) IN THIRD TRIMESTER: Primary | ICD-10-CM

## 2025-03-31 PROCEDURE — 99207 PR NO BILLABLE SERVICE THIS VISIT: CPT | Mod: 95

## 2025-03-31 NOTE — LETTER
3/31/2025         RE: Asmita Frye  [address] [facility] [place] [address]        Dear Colleague,    Thank you for referring your patient, Asmita Frye, to the [facility]. Please see a copy of my visit note below.    Diabetes and Pregnancy Follow-up  Type of Service: Video Visit/ 10 minutes     Originating Location (Patient Location): Home  Distant Location (Provider Location): [place] [facility]  Mode of Communication:  Video   #[id number] used for the duration of the visit     Visit Start Time: 7:43 AM  Visit End Time: 7:53 AM     How would patient like to obtain AVS? MyChart     Subjective/Objective:    Asmita Frye was called for a scheduled BG review. Last date of communication was: 3/27/25.    Gestational diabetes is being managed with medications    Taking diabetes medications: yes:     Diabetes Medication(s)       Insulin       insulin  UNIT/ML vial Inject 21 Units subcutaneously daily. Max dose: 29 units daily.            Estimated Date of Delivery: May 9, 2025    Blood Glucose/Ketone Log:    Date Ketones Fasting Post Breakfast Post Lunch Post Supper   3/27   126 104 131   3/28 negative 92 93 120 128   3/29  105 94 127 120   3/30 negative 88 112 126 124   3/31 negative 90      Testing 1 hour post meals     Assessment:  Ketones: negative x 3.   Fasting blood glucoses: 75% in target.  After breakfast: 100% in target.  Before lunch: n/a% in target.  After lunch: 100% in target.  Before dinner: n/a% in target.  After dinner: 100% in target.    Patient with no questions/concerns    Plan/Response:  No changes in the patient's current treatment plan.  Follow-up in 3-4 days.    Marisol Strange, MPH, RD, CDCES, LD 3/31/2025    Time Spent: 10 minutes    Any diabetes medication dose changes were made via the CDE Protocol and Collaborative Practice Agreement with the patient's referring provider. A copy of this encounter was shared with the  provider.

## 2025-03-31 NOTE — PROGRESS NOTES
Diabetes and Pregnancy Follow-up  Type of Service: Video Visit/ 10 minutes     Originating Location (Patient Location): Home  Distant Location (Provider Location): [facility] [place]  Mode of Communication:  Video   #[id number] used for the duration of the visit     Visit Start Time: 7:43 AM  Visit End Time: 7:53 AM     How would patient like to obtain AVS? Gideonharparrish     Subjective/Objective:    Asmita Frye was called for a scheduled BG review. Last date of communication was: 3/27/25.    Gestational diabetes is being managed with medications    Taking diabetes medications: yes:     Diabetes Medication(s)       Insulin       insulin  UNIT/ML vial Inject 21 Units subcutaneously daily. Max dose: 29 units daily.            Estimated Date of Delivery: May 9, 2025    Blood Glucose/Ketone Log:    Date Ketones Fasting Post Breakfast Post Lunch Post Supper   3/27   126 104 131   3/28 negative 92 93 120 128   3/29  105 94 127 120   3/30 negative 88 112 126 124   3/31 negative 90      Testing 1 hour post meals     Assessment:  Ketones: negative x 3.   Fasting blood glucoses: 75% in target.  After breakfast: 100% in target.  Before lunch: n/a% in target.  After lunch: 100% in target.  Before dinner: n/a% in target.  After dinner: 100% in target.    Patient with no questions/concerns    Plan/Response:  No changes in the patient's current treatment plan.  Follow-up in 3-4 days.    Marisol Strange, MPH, RD, CDCES, LD 3/31/2025    Time Spent: 10 minutes    Any diabetes medication dose changes were made via the CDE Protocol and Collaborative Practice Agreement with the patient's referring provider. A copy of this encounter was shared with the provider.

## 2025-04-02 ENCOUNTER — PRENATAL OFFICE VISIT (OUTPATIENT)
Dept: OBGYN | Facility: CLINIC | Age: 35
End: 2025-04-02
Payer: COMMERCIAL

## 2025-04-02 ENCOUNTER — HOSPITAL ENCOUNTER (OUTPATIENT)
Dept: ULTRASOUND IMAGING | Facility: CLINIC | Age: 35
Discharge: HOME | End: 2025-04-02
Attending: OBSTETRICS & GYNECOLOGY
Payer: COMMERCIAL

## 2025-04-02 VITALS
BODY MASS INDEX: 40.22 KG/M2 | TEMPERATURE: 99 F | RESPIRATION RATE: 20 BRPM | SYSTOLIC BLOOD PRESSURE: 117 MMHG | HEIGHT: 61 IN | WEIGHT: 213 LBS | HEART RATE: 104 BPM | DIASTOLIC BLOOD PRESSURE: 71 MMHG

## 2025-04-02 DIAGNOSIS — Z34.83 PRENATAL CARE, SUBSEQUENT PREGNANCY IN THIRD TRIMESTER: ICD-10-CM

## 2025-04-02 DIAGNOSIS — O24.414 INSULIN CONTROLLED GESTATIONAL DIABETES MELLITUS (GDM) IN THIRD TRIMESTER: ICD-10-CM

## 2025-04-02 DIAGNOSIS — O09.523 MULTIGRAVIDA OF ADVANCED MATERNAL AGE IN THIRD TRIMESTER: Primary | ICD-10-CM

## 2025-04-02 DIAGNOSIS — E66.812 CLASS 2 OBESITY WITHOUT SERIOUS COMORBIDITY WITH BODY MASS INDEX (BMI) OF 38.0 TO 38.9 IN ADULT, UNSPECIFIED OBESITY TYPE: ICD-10-CM

## 2025-04-02 DIAGNOSIS — Z98.891 HISTORY OF CESAREAN SECTION: ICD-10-CM

## 2025-04-02 PROBLEM — Z36.89 ENCOUNTER FOR TRIAGE IN PREGNANT PATIENT: Status: RESOLVED | Noted: 2025-03-26 | Resolved: 2025-04-02

## 2025-04-02 PROCEDURE — 76819 FETAL BIOPHYS PROFIL W/O NST: CPT

## 2025-04-02 PROCEDURE — 99207 PR PRENATAL VISIT: CPT | Performed by: OBSTETRICS & GYNECOLOGY

## 2025-04-02 PROCEDURE — 3074F SYST BP LT 130 MM HG: CPT | Performed by: OBSTETRICS & GYNECOLOGY

## 2025-04-02 PROCEDURE — 0502F SUBSEQUENT PRENATAL CARE: CPT | Performed by: OBSTETRICS & GYNECOLOGY

## 2025-04-02 PROCEDURE — 3078F DIAST BP <80 MM HG: CPT | Performed by: OBSTETRICS & GYNECOLOGY

## 2025-04-02 NOTE — PROGRESS NOTES
"[facility]    Return OB Note    CC: Return OB     Subjective:  Asmita is a 35 year old  at 34w5d   Denies vaginal bleeding, loss of fluid, or regular contractions. Good fetal movement.  Complaints today: None    Objective:  /71 (BP Location: Left arm, Patient Position: Sitting, Cuff Size: Adult Large)   Pulse 104   Temp 99  F (37.2  C) (Tympanic)   Resp 20   Ht 1.549 m (5' 1\")   Wt 96.6 kg (213 lb)   LMP 2024   BMI 40.25 kg/m      Fundal height: 35cm  FHT: 150bpm    Assessment/Plan:   Encounter Diagnoses   Name Primary?    Multigravida of advanced maternal age in third trimester Yes    History of  section     Prenatal care, subsequent pregnancy in third trimester     Insulin controlled gestational diabetes mellitus (GDM) in third trimester     Class 2 obesity without serious comorbidity with body mass index (BMI) of 38.0 to 38.9 in adult, unspecified obesity type        IUP at 34w5d  -NOB labs WNL  -Prenatal screening: declines  -Anatomy US: L2 WNL  -Mid trimester labs: gestational diabetes, anemia  -Planning to breast feed, has a pump  -Declined TDap     Co-Morbidities/Complications/Concerns:   -S>D: growth US scheduled for tomorrow. Planning to schedule BPPs while she is there  -GDMA2: started on insulin, NPH at night, continuing to titrate dosage. Will continue to monitor and assess insulin requirements. Will need serial growth US and twice weekly BPPs at 32 weeks, ordered. Plan delivery at 39 weeks. Last growth US 3/20 EFW 76%, AC 88%  -Iron deficiency anemia: taking PO iron, Hb improved 10.7-> 11.0  -Hx C section x3: plan C section with tubal for delivery,  with KB.   -AMA: S/p L2  -Obesity: BMI 38.Plan growth US at 32 weeks, BPPs at 37 weeks.   -2 risk factors for pre-e: recommend ASA   -Strict return precautions given    RTC 2 weeks    Melodie Sesay,     "

## 2025-04-02 NOTE — NURSING NOTE
"Initial /71 (BP Location: Left arm, Patient Position: Sitting, Cuff Size: Adult Large)   Pulse 104   Temp 99  F (37.2  C) (Tympanic)   Resp 20   Ht 1.549 m (5' 1\")   Wt 96.6 kg (213 lb)   LMP 07/16/2024   BMI 40.25 kg/m   Estimated body mass index is 40.25 kg/m  as calculated from the following:    Height as of this encounter: 1.549 m (5' 1\").    Weight as of this encounter: 96.6 kg (213 lb). .      "

## 2025-04-03 ENCOUNTER — VIRTUAL VISIT (OUTPATIENT)
Dept: EDUCATION SERVICES | Facility: CLINIC | Age: 35
End: 2025-04-03
Payer: COMMERCIAL

## 2025-04-03 DIAGNOSIS — O24.414 GESTATIONAL DIABETES MELLITUS (GDM) REQUIRING INSULIN: ICD-10-CM

## 2025-04-03 DIAGNOSIS — O24.414 INSULIN CONTROLLED GESTATIONAL DIABETES MELLITUS (GDM) IN THIRD TRIMESTER: Primary | ICD-10-CM

## 2025-04-03 NOTE — LETTER
4/3/2025         RE: Asmita Frye  [address] [facility] [place] [address]        Dear Colleague,    Thank you for referring your patient, Asmita Frye, to the [facility]. Please see a copy of my visit note below.    Diabetes and Pregnancy Follow-up  Type of Service: Video Visit/ 8 minutes     Originating Location (Patient Location): Home  Distant Location (Provider Location): [place] - [facility]  Mode of Communication:  Video  [facility] Hungarian interpreters used for the duration of the visit.    Visit Start Time: 8:08 AM, 8:12 AM  Visit End Time (telephone visit stop time): 8:11 AM, 8:17 AM     How would patient like to obtain AVS? not needed      Subjective/Objective:    Asmita Frye was called for a scheduled BG review. Last date of communication was: 3/31/25.    Gestational diabetes is being managed with medications    Taking diabetes medications: yes:     Diabetes Medication(s)       Insulin       insulin  UNIT/ML vial Inject 21 Units subcutaneously daily. Max dose: 29 units daily.            Estimated Date of Delivery: May 9, 2025;  planned for 25    Blood Glucose/Ketone Log:    Date Ketones Fasting Post Breakfast Post Lunch Post Supper   3/31   133 110 122   4/1 negative 98 126 149 115   4/2 negative 96 146 123 155   4/3 negative 96      Testing 1 hour post meal    Assessment:  Ketones: negative x3.   Fasting blood glucoses: 0% in target.  After breakfast: 67% in target.  Before lunch: n/a% in target.  After lunch: 67% in target.  Before dinner: n/a% in target.  After dinner: 67% in target.    Patient with no questions/concerns.  Instructed to increase NPH at HS given most recent 3 fasting glucoses elevated above target.  In the past week, 86% of all post meal glucoses in target.  Plan/Response:  Follow-up on Monday.  Increase NPH to 24 units/day at HS.    Marisol Strange, MPH, RD, CDCES, LD 4/3/2025    Time Spent: 8 minutes    Any diabetes  medication dose changes were made via the CDE Protocol and Collaborative Practice Agreement with the patient's referring provider. A copy of this encounter was shared with the provider.

## 2025-04-03 NOTE — PROGRESS NOTES
Diabetes and Pregnancy Follow-up  Type of Service: Video Visit/ 8 minutes     Originating Location (Patient Location): Home  Distant Location (Provider Location): [facility] [place]  Mode of Communication:  Video  [employer] interpreters used for the duration of the visit.    Visit Start Time: 8:08 AM, 8:12 AM  Visit End Time (telephone visit stop time): 8:11 AM, 8:17 AM     How would patient like to obtain AVS? not needed      Subjective/Objective:    Asmita Frye was called for a scheduled BG review. Last date of communication was: 3/31/25.    Gestational diabetes is being managed with medications    Taking diabetes medications: yes:     Diabetes Medication(s)       Insulin       insulin  UNIT/ML vial Inject 21 Units subcutaneously daily. Max dose: 29 units daily.            Estimated Date of Delivery: May 9, 2025;  planned for 25    Blood Glucose/Ketone Log:    Date Ketones Fasting Post Breakfast Post Lunch Post Supper   3/31   133 110 122   4/1 negative 98 126 149 115   4/2 negative 96 146 123 155   4/3 negative 96      Testing 1 hour post meal    Assessment:  Ketones: negative x3.   Fasting blood glucoses: 0% in target.  After breakfast: 67% in target.  Before lunch: n/a% in target.  After lunch: 67% in target.  Before dinner: n/a% in target.  After dinner: 67% in target.    Patient with no questions/concerns.  Instructed to increase NPH at HS given most recent 3 fasting glucoses elevated above target.  In the past week, 86% of all post meal glucoses in target.  Plan/Response:  Follow-up on Monday.  Increase NPH to 24 units/day at HS.    Marisol Strange, MPH, RD, CDCES, LD 4/3/2025    Time Spent: 8 minutes    Any diabetes medication dose changes were made via the CDE Protocol and Collaborative Practice Agreement with the patient's referring provider. A copy of this encounter was shared with the provider.

## 2025-04-04 ENCOUNTER — HOSPITAL ENCOUNTER (OUTPATIENT)
Dept: ULTRASOUND IMAGING | Facility: CLINIC | Age: 35
Discharge: HOME | End: 2025-04-04
Attending: OBSTETRICS & GYNECOLOGY | Admitting: OBSTETRICS & GYNECOLOGY
Payer: COMMERCIAL

## 2025-04-04 DIAGNOSIS — O24.414 INSULIN CONTROLLED GESTATIONAL DIABETES MELLITUS (GDM) IN THIRD TRIMESTER: ICD-10-CM

## 2025-04-04 PROCEDURE — 76819 FETAL BIOPHYS PROFIL W/O NST: CPT

## 2025-04-07 ENCOUNTER — VIRTUAL VISIT (OUTPATIENT)
Dept: EDUCATION SERVICES | Facility: CLINIC | Age: 35
End: 2025-04-07
Payer: COMMERCIAL

## 2025-04-07 DIAGNOSIS — O24.414 INSULIN CONTROLLED GESTATIONAL DIABETES MELLITUS (GDM) IN THIRD TRIMESTER: Primary | ICD-10-CM

## 2025-04-07 DIAGNOSIS — O24.414 GESTATIONAL DIABETES MELLITUS (GDM) REQUIRING INSULIN: ICD-10-CM

## 2025-04-07 PROCEDURE — G0108 DIAB MANAGE TRN  PER INDIV: HCPCS | Mod: 95

## 2025-04-07 NOTE — PROGRESS NOTES
Diabetes and Pregnancy Follow-up  Type of Service: Video Visit/ 34 minutes     Originating Location (Patient Location): Home  Distant Location (Provider Location): Home  Mode of Communication:  Video  Malay phone  #[id number] used for the duration of the visit     Visit Start Time: 7:34 AM  Visit End Time: 8:08 AM     How would patient like to obtain AVS? Not needed      Subjective/Objective:    Asmita Frye was called for a scheduled BG review. Last date of communication was: 4/3/24.    Gestational diabetes is being managed with medications    Taking diabetes medications: yes:     Diabetes Medication(s)       Insulin       insulin  UNIT/ML vial Inject 24 Units subcutaneously daily. Increase as instructed. Max dose: 35 units daily.            Estimated Date of Delivery: May 9, 2025    Blood Glucose/Ketone Log:    Date Ketones Fasting Post Breakfast Post Lunch Post Supper   4/3   123 137 125   4/4  95 187/135* 122 135   4/5  92 164/146* (toast with peanut butter, coffee with Chobani creamer) 119 141/121* (3 beef tacos, salad)   4/6  92 117 (shrimp cervice, non-starchy vegetables, 5-10 tortilla chips) 120 150   4/7 negative 110      Testing 1 hour after meals unless * = 2 hours after    Assessment:  Ketones: negative x1.   Fasting blood glucoses: 50% in target.  After breakfast: 50% in target (57% in target in the past week)  Before lunch: n/a% in target.  After lunch: 100% in target (86% in target in the past week).  Before dinner: n/a% in target.  After dinner: 50% in target (57% in target in the past week)    Per patient report of some recent meals as above, patient's portion seem small/reasonable.  Discussed starting rapid acting insulin before breakfast meal and patient is not yet ready.  She became teary sharing about a concern that was identified with the baby last week.  Patient was encouraged to discuss this with her OB provider. Given patient not yet ready to start mealtime insulin,  recommended walking for 5-10 minutes post meal and before 1 hour post meal glucose check due as long as okay per OB.  Patient shared some concern with knee pain and writer encouraged patient to listen to her body.      Plan/Response:  Increase NPH to 26 units/day (HS).   Follow up with Marisol zuniga on Thursday, 4/10/25.     Marisol Strange, MPH, RD, CDCES, LD 4/7/2025    Time Spent: 34 minutes    Any diabetes medication dose changes were made via the CDE Protocol and Collaborative Practice Agreement with the patient's referring provider. A copy of this encounter was shared with the provider.

## 2025-04-07 NOTE — Clinical Note
Samir Sesay,  Just a heads up that I discussed starting mealtime insulin today and patient not yet ready.  She was teary when sharing about a concern with the baby found last week?  I didn't see anything noted and I advised her to follow up with you about this.  Let me know if any questions/concerns.  Thank you! Marisol Strange, MPH, RD, CDCES, LD 4/7/2025

## 2025-04-10 ENCOUNTER — VIRTUAL VISIT (OUTPATIENT)
Dept: EDUCATION SERVICES | Facility: CLINIC | Age: 35
End: 2025-04-10
Payer: COMMERCIAL

## 2025-04-10 DIAGNOSIS — O24.414 INSULIN CONTROLLED GESTATIONAL DIABETES MELLITUS (GDM) IN THIRD TRIMESTER: Primary | ICD-10-CM

## 2025-04-10 NOTE — PROGRESS NOTES
Diabetes and Pregnancy Follow-up  Type of Service: Video Visit/ 23 minutes     Originating Location (Patient Location): Home  Distant Location (Provider Location): [facility] [place]  Mode of Communication:  Video  "[employer] interpreter used for the duration of the visit.     Video Visit Start Time: 8:03 AM  Video Visit End Time: 8:26 AM     How would patient like to obtain AVS? not needed      Subjective/Objective:    Asmita Frye was called for a scheduled BG review. Last date of communication was: 4/7/25.    Gestational diabetes is being managed with medications    Taking diabetes medications: yes:     Diabetes Medication(s)       Insulin       insulin  UNIT/ML vial Inject 26 Units subcutaneously daily. Increase as instructed. Max dose: 42 units daily.            Estimated Date of Delivery: May 9, 2025    Blood Glucose/Ketone Log:    Date Ketones Fasting Post Breakfast Post Lunch Post Supper   4/7   105 111 135   4/8 15 85 128 132 124   4/9 0 93 128 106 146 (soup with chicken, carrots, garbanzo beans & 3 tortillas)   4/10 0 90      Testing 1 hour post meals.    Assessment:  Ketones: negative x2, small x1.   Fasting blood glucoses: 100% in target (71% in target in the past week)  After breakfast: 100% in target (71% in target in the past week)   Before lunch: n/a% in target.  After lunch: 100% in target (100% in target in the past week).  Before dinner: n/a% in target.  After dinner: 67% in target (57% in target in the past week).    Suggested decreasing portion of tortillas to 2 IF having soup also containing beans and increasing protein and non-starchy vegetables such as salad.  Patient states understanding and agreement.  Patient started using current vial of insulin on 3/25/25.  Given current insulin dose, she will need to obtain another vial of insulin from the pharmacy if c section occurs as scheduled on 5/2/25.      Plan/Response:  No changes in the patient's current treatment  plan.  Follow-up on Monday.    Marisol Strange, MPH, RD, CDCES, LD 4/10/2025  Time Spent: 23 minutes    Any diabetes medication dose changes were made via the CDE Protocol and Collaborative Practice Agreement with the patient's referring provider. A copy of this encounter was shared with the provider.

## 2025-04-10 NOTE — LETTER
4/10/2025         RE: Asmita Frye  [address] [facility] [address]        Dear Colleague,    Thank you for referring your patient, [facility]mit[facility] rFye[facility] Please see a copy of my visit note below.    Diabetes and Pregnancy Follow-up  Type of Service: Telephone Visit/ 23 minutes     Originating Location (Patient Location): Home  Distant Location (Provider Location): Home - [facility]  Mode of Communication:  Telephone  [facility]  used for the duration of the visit.     Telephone Visit Start Time: 8:03 AM  Telephone Visit End Time (telephone visit stop time): 8:26 AM     How would patient like to obtain AVS? not needed      Subjective/Objective:    Asmita Frye was called for a scheduled BG review. Last date of communication was: 4/7/25.    Gestational diabetes is being managed with medications    Taking diabetes medications: yes:     Diabetes Medication(s)       Insulin       insulin  UNIT/ML vial Inject 26 Units subcutaneously daily. Increase as instructed. Max dose: 42 units daily.            Estimated Date of Delivery: May 9, 2025    Blood Glucose/Ketone Log:    Date Ketones Fasting Post Breakfast Post Lunch Post Supper   4/7   105 111 135   4/8 15 85 128 132 124   4/9 0 93 128 106 146 (soup with chicken, carrots, garbanzo beans & 3 tortillas)   4/10 0 90      Testing 1 hour post meals.    Assessment:  Ketones: negative x2, small x1.   Fasting blood glucoses: 100% in target (71% in target in the past week)  After breakfast: 100% in target (71% in target in the past week)   Before lunch: n/a% in target.  After lunch: 100% in target (100% in target in the past week).  Before dinner: n/a% in target.  After dinner: 67% in target (57% in target in the past week).    Plan/Response:  No changes in the patient's current treatment plan.  Follow-up on Monday.    Marisol Strange, MPH, RD, CDCES, LD 4/10/2025  Time Spent: 23  minutes    Any diabetes medication dose changes were made via the CDE Protocol and Collaborative Practice Agreement with the patient's referring provider. A copy of this encounter was shared with the provider.

## 2025-04-14 ENCOUNTER — VIRTUAL VISIT (OUTPATIENT)
Dept: EDUCATION SERVICES | Facility: CLINIC | Age: 35
End: 2025-04-14
Payer: COMMERCIAL

## 2025-04-14 DIAGNOSIS — O24.414 INSULIN CONTROLLED GESTATIONAL DIABETES MELLITUS (GDM) IN THIRD TRIMESTER: Primary | ICD-10-CM

## 2025-04-14 PROCEDURE — 99207 PR NO BILLABLE SERVICE THIS VISIT: CPT

## 2025-04-14 NOTE — PROGRESS NOTES
Diabetes and Pregnancy Follow-up  Type of Service: Video Visit/ 15 minutes     Originating Location (Patient Location): Home  Distant Location (Provider Location): [facility] [place]  Mode of Communication:  Video     Visit Start Time: 7:36 AM  Visit End Time: 7:51 AM     How would patient like to obtain AVS? not needed      Subjective/Objective:    Asmita Frye was called for a scheduled BG review. Last date of communication was: 4/10/25.    Gestational diabetes is being managed with medications    Taking diabetes medications: yes:     Diabetes Medication(s)       Insulin       insulin  UNIT/ML vial Inject 26 Units subcutaneously daily. Increase as instructed. Max dose: 42 units daily.            Estimated Date of Delivery: May 9, 2025    Blood Glucose/Ketone Log:    Date Ketones Fasting Post Breakfast Post Lunch Post Supper   4/10   126 113 137   4/11 negative 79 135 121 124   4/12 negative 83 145 106 124   4/13 negative 86 125 89 122   4/14 small 88      Testing 1 hour post meal    Assessment:  Ketones: negative x3, small x1.   Fasting blood glucoses: 100% in target.  After breakfast: 75% in target.  Before lunch: n/a% in target.  After lunch: 100% in target.  Before dinner: n/a% in target.  After dinner: 100% in target.    Reports she generally has dinner at 7-7:30 PM.  Last night had HS snack at 8:40 PM and went to bed at 10 PM.  HS snack last night was a protein bar.     Plan/Response:  -Have HS snack at about 9:30 PM  -No change to insulin dose.   -Follow up with Marisol on Friday, 4/18/25.    Marisol Strange, MPH, RD, CDCES, LD 4/14/2025    Time Spent: 15 minutes    Any diabetes medication dose changes were made via the CDE Protocol and Collaborative Practice Agreement with the patient's referring provider. A copy of this encounter was shared with the provider.

## 2025-04-14 NOTE — LETTER
4/14/2025         RE: Asmita Frye  [address] [facility] [address]        Dear Colleague,    Thank you for referring your patient, Asmita Frye, to the [facility]. Please see a copy of my visit note below.    Diabetes and Pregnancy Follow-up  Type of Service: Video Visit/ 15 minutes     Originating Location (Patient Location): Home  Distant Location (Provider Location): Home - [facility]  Mode of Communication:  Video     Visit Start Time: 7:36 AM  Visit End Time: 7:51 AM     How would patient like to obtain AVS? not needed      Subjective/Objective:    Asmita Frye was called for a scheduled BG review. Last date of communication was: 4/10/25.    Gestational diabetes is being managed with medications    Taking diabetes medications: yes:     Diabetes Medication(s)       Insulin       insulin  UNIT/ML vial Inject 26 Units subcutaneously daily. Increase as instructed. Max dose: 42 units daily.            Estimated Date of Delivery: May 9, 2025    Blood Glucose/Ketone Log:    Date Ketones Fasting Post Breakfast Post Lunch Post Supper   4/10   126 113 137   4/11 negative 79 135 121 124   4/12 negative 83 145 106 124   4/13 negative 86 125 89 122   4/14 small 88      Testing 1 hour post meal    Assessment:  Ketones: negative x3, small x1.   Fasting blood glucoses: 100% in target.  After breakfast: 75% in target.  Before lunch: n/a% in target.  After lunch: 100% in target.  Before dinner: n/a% in target.  After dinner: 100% in target.    Reports she generally has dinner at 7-7:30 PM.  Last night had HS snack at 8:40 PM and went to bed at 10 PM.  HS snack last night was a protein bar.     Plan/Response:  -Have HS snack at about 9:30 PM  -No change to insulin dose.   -Follow up with Marisol on Friday, 4/18/25.    Marisol Strange, MPH, RD, CDCES, LD 4/14/2025    Time Spent: 15 minutes    Any diabetes medication dose changes were made via the CDE Protocol and Collaborative  Practice Agreement with the patient's referring provider. A copy of this encounter was shared with the provider.

## 2025-04-15 NOTE — PATIENT INSTRUCTIONS
Weeks 34 to 36 of Your Pregnancy: Care Instructions  Your belly has grown quite large. It's almost time to give birth! Your baby's lungs are almost ready to breathe air. The skull bones are firm enough to protect your baby's head. But they're soft enough to move down through the birth canal.    You might be wondering what to expect during labor. Because each birth is different, there's no way to know exactly what childbirth will be like for you. Talk to your doctor or midwife about any concerns you have.   You'll probably have a test for group B streptococcus (GBS). GBS is bacteria that can live in the vagina and rectum. GBS can make your baby sick after birth. If you test positive, you'll get antibiotics during labor.     Choose what type of pain relief you would like during labor.  You can choose from a few types, including medicine and non-medicine options. You may want to use several types of pain relief.     Know how medicines can help with pain during labor.  Some medicines lower anxiety and help with some of the pain. Others make your lower body numb so that you will feel less pain.     Tell your doctor about your pain medicine choice.  Do this before you start labor or very early in your labor. You may be able to change your mind during labor.     Learn about the stages of labor.    The first stage includes the early (latent) and active phases of labor. Contractions start in early labor. During active labor, contractions get stronger, last longer, and happen more often. And the cervix opens more rapidly.  The second stage starts when you're ready to push. During this stage, your baby is born.  During the third stage, you'll usually have a few more contractions to push out the placenta.   Follow-up care is a key part of your treatment and safety. Be sure to make and go to all appointments, and call your doctor if you are having problems. It's also a good idea to know your test results and keep a list of the  "medicines you take.  Where can you learn more?  Go to https://www.[employer].net/patiented  Enter B912 in the search box to learn more about \"Weeks 34 to 36 of Your Pregnancy: Care Instructions.\"  Current as of: 2024  Content Version: 14.4    4146-3332 [email or web address].   Care instructions adapted under license by your healthcare professional. If you have questions about a medical condition or this instruction, always ask your healthcare professional. [email or web address] disclaims any warranty or liability for your use of this information.    Group B Strep During Pregnancy: Care Instructions  Overview     Group B strep infection is caused by a type of bacteria. It's a different kind of bacteria than the kind that causes strep throat.  You may have this kind of bacteria in your body. Sometimes it may cause an infection, but most of the time it doesn't make you sick or cause symptoms. But if you pass the bacteria to your baby during the birth, it can cause serious health problems for your baby.  If you have this bacteria in your body, you will get antibiotics when you are in labor. Antibiotics help prevent problems for a  baby.  After birth, doctors will watch and may test your baby. If your baby tests positive for Group B strep, your baby will get antibiotics.  If you plan to breastfeed your baby, don't worry. It will be safe to breastfeed.  Follow-up care is a key part of your treatment and safety. Be sure to make and go to all appointments, and call your doctor if you are having problems. It's also a good idea to know your test results and keep a list of the medicines you take.  How can you care for yourself at home?  If your doctor has prescribed antibiotics, take them as directed. Do not stop taking them just because you feel better. You need to take the full course of antibiotics.  Tell your doctor if you are allergic to any antibiotic.  If you go into labor, or your water breaks, go to " "the hospital. Your doctor will give you antibiotics to help protect your baby from infection.  Tell the doctors and nurses if you have an allergy to penicillin.  Tell the doctors and nurses at the hospital that you tested positive for group B strep.  When should you call for help?   Call your doctor now or seek immediate medical care if:    You have symptoms of a urinary tract infection. These may include:  Pain or burning when you urinate.  A frequent need to urinate without being able to pass much urine.  Pain in the flank, which is just below the rib cage and above the waist on either side of the back.  Blood in your urine.  A fever.     You think you are in labor or your water has broken.     You have pain in your belly or pelvis.   Watch closely for changes in your health, and be sure to contact your doctor if you have any problems.  Where can you learn more?  Go to https://www.[employer].MeetDoctor/patiented  Enter M001 in the search box to learn more about \"Group B Strep During Pregnancy: Care Instructions.\"  Current as of: April 30, 2024  Content Version: 14.4    1550-8354 [email or web address].   Care instructions adapted under license by your healthcare professional. If you have questions about a medical condition or this instruction, always ask your healthcare professional. [employer] disclaims any warranty or liability for your use of this information.    Circumcision in Infants: What to Expect at Home  Your Child's Recovery  After circumcision, your baby's penis may look red and swollen. It may have petroleum jelly and gauze on it. The gauze will likely come off when your baby urinates. Follow your doctor's directions about whether to put clean gauze back on your baby's penis or to leave the gauze off. If you need to remove gauze from the penis, use warm water to soak the gauze and gently loosen it.  The doctor may have used a Plastibell device to do the circumcision. If so, your baby will have a " plastic ring around the head of the penis. The ring should fall off by itself in 10 to 12 days.  A thin, yellow film may form over the area the day after the procedure. This is part of the normal healing process. It should go away in a few days.  Your baby may seem fussy while the area heals. It may hurt for your baby to urinate. This pain often gets better in 3 or 4 days. But it may last for up to 2 weeks.  Even though your baby's penis will likely start to feel better after 3 or 4 days, it may look worse. The penis often starts to look like it's getting better after about 7 to 10 days.  This care sheet gives you a general idea about how long it will take for your child to recover. But each child recovers at a different pace. Follow the steps below to help your child get better as quickly as possible.  How can you care for your child at home?  Activity    Let your baby rest as much as possible. Sleeping will help with recovery.     You can give your baby a sponge bath the day after surgery. Ask your doctor when it is okay to give your baby a bath.   Medicines    Your doctor will tell you if and when your child can restart any medicines. The doctor will also give you instructions about your child taking any new medicines.     Your doctor may recommend giving your baby acetaminophen (Tylenol) to help with pain after the procedure. Be safe with medicines. Give your child medicines exactly as prescribed. Call your doctor if you think your child is having a problem with a medicine.     Do not give your child two or more pain medicines at the same time unless the doctor told you to. Many pain medicines have acetaminophen, which is Tylenol. Too much acetaminophen (Tylenol) can be harmful.   Circumcision care    Always wash your hands before and after touching the circumcision area.     Gently wash your baby's penis with plain, warm water after each diaper change, and pat it dry. Do not use soap. Don't use hydrogen  "peroxide or alcohol. They can slow healing.     Do not try to remove the film that forms on the penis. The film will go away on its own.     Put plenty of petroleum jelly (such as Vaseline) on the circumcision area during each diaper change. This will prevent your baby's penis from sticking to the diaper while it heals.     Fasten your baby's diapers loosely so that there is less pressure on the penis while it heals.   Follow-up care is a key part of your child's treatment and safety. Be sure to make and go to all appointments, and call your doctor if your child is having problems. It's also a good idea to know your child's test results and keep a list of the medicines your child takes.  When should you call for help?   Call your doctor now or seek immediate medical care if:    Your baby has a fever over 100.4 F.     Your baby is extremely fussy or irritable, has a high-pitched cry, or refuses to eat.     Your baby does not have a wet diaper within 12 hours after the circumcision.     You find a spot of bleeding larger than a 2-inch [place] from the incision.     Your baby has signs of infection. Signs may include severe swelling; redness; a red streak on the shaft of the penis; or a thick, yellow discharge.   Watch closely for changes in your child's health, and be sure to contact your doctor if:    A Plastibell device was used for the circumcision and the ring has not fallen off after 10 to 12 days.   Where can you learn more?  Go to https://www.[employer].net/patiented  Enter S255 in the search box to learn more about \"Circumcision in Infants: What to Expect at Home.\"  Current as of: October 24, 2024  Content Version: 14.4    0229-2115 [email or web address].   Care instructions adapted under license by your healthcare professional. If you have questions about a medical condition or this instruction, always ask your healthcare professional. [employer] disclaims any warranty or liability for your use of " "this information.    Week 37 of Your Pregnancy: Care Instructions    Most babies are born between 37 and 40 weeks.   This is a good time to pack a bag to take with you to the birth. Then it will be ready to go when you are.     Learn about breastfeeding.  For example, find out about ways to hold your baby to make breastfeeding easier. And think about learning how to pump and store milk.     Know that crying is normal.  It's common for babies to cry 1 to 3 hours a day. Some cry more, and some cry less.     Learn why babies cry.  They may be hungry; have gas; need a diaper change; or feel cold, warm, tired, lonely, or tense. Sometimes they cry for unknown reasons.     Think about what will help you stay calm when your baby cries.  Taking slow, deep breaths can help. So can taking a break. It's okay to put your baby somewhere safe (like their crib) and walk away for a few minutes.     Learn about safe sleep for your baby.  Always put your baby to sleep on their back. Place them alone in a crib or bassinet with a firm, flat surface. Keep soft items like stuffed animals out of the crib.     Learn what to expect with  poop.  Your baby will have their own bowel patterns. Some babies have several bowel movements a day. Some have fewer.     Know that  babies will often have loose, yellow bowel movements.  Formula-fed babies have more formed stools. If your baby's poop looks like pellets, your baby is constipated.   Follow-up care is a key part of your treatment and safety. Be sure to make and go to all appointments, and call your doctor if you are having problems. It's also a good idea to know your test results and keep a list of the medicines you take.  Where can you learn more?  Go to https://www.[employer].net/patiented  Enter N257 in the search box to learn more about \"Week 37 of Your Pregnancy: Care Instructions.\"  Current as of: 2024  Content Version: 14.4    1788-5243 [facility]. "   Care instructions adapted under license by your healthcare professional. If you have questions about a medical condition or this instruction, always ask your healthcare professional. [employer] disclaims any warranty or liability for your use of this information.

## 2025-04-16 ENCOUNTER — PRENATAL OFFICE VISIT (OUTPATIENT)
Dept: OBGYN | Facility: CLINIC | Age: 35
End: 2025-04-16
Payer: COMMERCIAL

## 2025-04-16 VITALS
WEIGHT: 213.6 LBS | TEMPERATURE: 97.8 F | RESPIRATION RATE: 18 BRPM | DIASTOLIC BLOOD PRESSURE: 80 MMHG | BODY MASS INDEX: 40.33 KG/M2 | SYSTOLIC BLOOD PRESSURE: 125 MMHG | HEIGHT: 61 IN | HEART RATE: 89 BPM

## 2025-04-16 DIAGNOSIS — Z34.83 PRENATAL CARE, SUBSEQUENT PREGNANCY IN THIRD TRIMESTER: Primary | ICD-10-CM

## 2025-04-16 DIAGNOSIS — Z98.891 HISTORY OF CESAREAN SECTION: ICD-10-CM

## 2025-04-16 DIAGNOSIS — O09.523 MULTIGRAVIDA OF ADVANCED MATERNAL AGE IN THIRD TRIMESTER: ICD-10-CM

## 2025-04-16 DIAGNOSIS — O24.414 INSULIN CONTROLLED GESTATIONAL DIABETES MELLITUS (GDM) IN THIRD TRIMESTER: ICD-10-CM

## 2025-04-16 DIAGNOSIS — E66.812 CLASS 2 OBESITY WITHOUT SERIOUS COMORBIDITY WITH BODY MASS INDEX (BMI) OF 38.0 TO 38.9 IN ADULT, UNSPECIFIED OBESITY TYPE: ICD-10-CM

## 2025-04-16 NOTE — NURSING NOTE
"Initial /80 (BP Location: Right arm, Patient Position: Sitting, Cuff Size: Adult Large)   Pulse 89   Temp 97.8  F (36.6  C) (Tympanic)   Resp 18   Ht 1.549 m (5' 1\")   Wt 96.9 kg (213 lb 9.6 oz)   LMP 07/16/2024   BMI 40.36 kg/m   Estimated body mass index is 40.36 kg/m  as calculated from the following:    Height as of this encounter: 1.549 m (5' 1\").    Weight as of this encounter: 96.9 kg (213 lb 9.6 oz). .      "

## 2025-04-16 NOTE — PROGRESS NOTES
"[facility]    Return OB Note    CC: Return OB     Subjective:  Asmita is a 35 year old  at 36w5d   Denies vaginal bleeding, loss of fluid, or regular contractions. Good fetal movement.  Complaints today: None    Objective:  /80 (BP Location: Right arm, Patient Position: Sitting, Cuff Size: Adult Large)   Pulse 89   Temp 97.8  F (36.6  C) (Tympanic)   Resp 18   Ht 1.549 m (5' 1\")   Wt 96.9 kg (213 lb 9.6 oz)   LMP 2024   BMI 40.36 kg/m      Fundal height: 39cm  FHT: 140bpm  SVE: 0/thick/high    Assessment/Plan:   Encounter Diagnoses   Name Primary?    Prenatal care, subsequent pregnancy in third trimester Yes    Multigravida of advanced maternal age in third trimester     History of  section     Insulin controlled gestational diabetes mellitus (GDM) in third trimester     Class 2 obesity without serious comorbidity with body mass index (BMI) of 38.0 to 38.9 in adult, unspecified obesity type        IUP at 36w5d  -NOB labs WNL  -Prenatal screening: declines  -Anatomy US: L2 WNL  -Mid trimester labs: gestational diabetes, anemia  -Planning to breast feed, has a pump  -Declined Tdap  -GBS collected today     Co-Morbidities/Complications/Concerns:   -S>D: needs a follow up growth US, order has been placed  -GDMA2: started on insulin, NPH at night, continuing to titrate dosage. Will continue to monitor and assess insulin requirements. Will need serial growth US and twice weekly BPPs at 32 weeks, ordered. Plan delivery at 39 weeks. Last growth US 3/20 EFW 76%, AC 88%  -Iron deficiency anemia: taking PO iron, Hb improved 10.7-> 11.0  -Hx C section x3: plan C section with tubal for delivery,  with KB.   -AMA: S/p L2  -Obesity: BMI 38.Plan growth US at 32 weeks, BPPs at 37 weeks.   -2 risk factors for pre-e: recommend ASA   -Strict return precautions given    RTC 1 weeks    Melodie Sesay DO    "

## 2025-04-17 LAB — GP B STREP DNA SPEC QL NAA+PROBE: POSITIVE

## 2025-04-20 ENCOUNTER — ANESTHESIA EVENT (OUTPATIENT)
Dept: SURGERY | Facility: CLINIC | Age: 35
End: 2025-04-20
Payer: COMMERCIAL

## 2025-04-20 ASSESSMENT — LIFESTYLE VARIABLES: TOBACCO_USE: 1

## 2025-04-20 NOTE — ANESTHESIA PREPROCEDURE EVALUATION
Anesthesia Pre-Procedure Evaluation    Patient: Asmita Frye   MRN: [id number] : 1990        Procedure : Procedure(s):  REPEAT  SECTION, WITH BILATERAL SALPINGECTOMY          Past Medical History:   Diagnosis Date     Chickenpox       Past Surgical History:   Procedure Laterality Date     APPENDECTOMY        SECTION N/A 2023    Procedure:  SECTION;  Surgeon: Melodie Sesay DO;  Location: [facility] OR     GYN SURGERY        No Known Allergies   Social History     Tobacco Use     Smoking status: Former     Current packs/day: 0.00     Types: Cigarettes     Quit date: 2023     Years since quittin.2     Smokeless tobacco: Never   Substance Use Topics     Alcohol use: Not Currently      Wt Readings from Last 1 Encounters:   25 96.9 kg (213 lb 9.6 oz)        Anesthesia Evaluation   Pt has had prior anesthetic. Type: Regional and General.        ROS/MED HX  ENT/Pulmonary:  - neg pulmonary ROS   (+)                tobacco use, Past use,                       Neurologic:  - neg neurologic ROS     Cardiovascular:  - neg cardiovascular ROS     METS/Exercise Tolerance:     Hematologic:  - neg hematologic  ROS     Musculoskeletal:       GI/Hepatic: Comment: H pylori history    (+) GERD,                   Renal/Genitourinary:       Endo: Comment: Gestational Diabetes     (+)  type II DM,   Using insulin,          Obesity,       Psychiatric/Substance Use:  - neg psychiatric ROS     Infectious Disease:       Malignancy:       Other:      (+) Possibly pregnant, LMP: Pregnant, ,         Physical Exam    Airway        Mallampati: III   TM distance: > 3 FB   Neck ROM: full   Mouth opening: > 3 cm    Respiratory Devices and Support         Dental       (+) Minor Abnormalities - some fillings, tiny chips      Cardiovascular   cardiovascular exam normal       Rhythm and rate: regular and normal     Pulmonary   pulmonary exam normal        breath sounds clear to auscultation  "      OUTSIDE LABS:  CBC:   Lab Results   Component Value Date    WBC 7.4 03/19/2025    WBC 7.9 02/12/2025    HGB 11.0 (L) 03/19/2025    HGB 10.7 (L) 02/12/2025    HCT 33.0 (L) 03/19/2025    HCT 31.9 (L) 02/12/2025     03/19/2025     02/12/2025     BMP:   Lab Results   Component Value Date     06/20/2024     12/21/2023    POTASSIUM 4.1 06/20/2024    POTASSIUM 3.6 12/21/2023    CHLORIDE 104 06/20/2024    CHLORIDE 102 12/21/2023    CO2 21 (L) 06/20/2024    CO2 22 12/21/2023    BUN 10.3 06/20/2024    BUN 8.3 12/21/2023    CR 0.57 06/20/2024    CR 0.52 12/21/2023     (H) 06/20/2024    GLC 99 12/22/2023     COAGS: No results found for: \"PTT\", \"INR\", \"FIBR\"  POC:   Lab Results   Component Value Date    HCG Negative 02/14/2024    HCGS Negative 06/20/2024     HEPATIC:   Lab Results   Component Value Date    ALBUMIN 4.3 06/20/2024    PROTTOTAL 8.0 06/20/2024    ALT 91 (H) 06/20/2024    AST 55 (H) 06/20/2024    ALKPHOS 104 06/20/2024    BILITOTAL 0.3 06/20/2024     OTHER:   Lab Results   Component Value Date    A1C 5.2 09/24/2024    FLYNN 9.6 06/20/2024    LIPASE 34 06/20/2024       Anesthesia Plan    ASA Status:  2, emergent       Anesthesia Type: Spinal.              Consents    Anesthesia Plan(s) and associated risks, benefits, and realistic alternatives discussed. Questions answered and patient/representative(s) expressed understanding.     - Discussed:     - Discussed with:  Patient, Spouse            Postoperative Care            Comments:         neg OB ROS.    MERVAT Delgado CRNA    Clinically Significant Risk Factors Present on Admission                                          "

## 2025-04-21 ENCOUNTER — VIRTUAL VISIT (OUTPATIENT)
Dept: EDUCATION SERVICES | Facility: CLINIC | Age: 35
End: 2025-04-21
Payer: COMMERCIAL

## 2025-04-21 DIAGNOSIS — O24.414 INSULIN CONTROLLED GESTATIONAL DIABETES MELLITUS (GDM) IN THIRD TRIMESTER: ICD-10-CM

## 2025-04-21 PROCEDURE — 99207 PR NO BILLABLE SERVICE THIS VISIT: CPT

## 2025-04-21 RX ORDER — INSULIN ASPART 100 [IU]/ML
3 INJECTION, SOLUTION INTRAVENOUS; SUBCUTANEOUS
Status: SHIPPED
Start: 2025-04-21

## 2025-04-21 NOTE — PROGRESS NOTES
Diabetes and Pregnancy Follow-up  Type of Service: Video Visit/ 13 minutes     Originating Location (Patient Location): Home  Distant Location (Provider Location): [facility]   Mode of Communication:  Video  [employer]  used for the duration of the visit    Visit Start Time: 8:03 AM  Visit End Time: 8:16 AM     How would patient like to obtain AVS? not needed      Subjective/Objective:    Asmita Frye was called for a scheduled BG review. Last date of communication was: 4/18/25.    Gestational diabetes is being managed with medications    Taking diabetes medications: yes:     Diabetes Medication(s)       Insulin       insulin aspart (NOVOLOG FLEXPEN) 100 UNIT/ML pen Inject 3 Units subcutaneously daily (before supper).  NOT YET STARTED     insulin  UNIT/ML vial Inject 26 Units subcutaneously daily. Increase as instructed. Max dose: 42 units daily.            Estimated Date of Delivery: May 9, 2025    Blood Glucose/Ketone Log:    Date Ketones Fasting Post Breakfast Post Lunch Post Supper   4/18   127 149 134   4/19  91 124 116 106 (salad and hard boiled egg for HS snack)   4/20 small 85 89 133 97   4/21 negative 94      Checking 1 hour post meals    Assessment:  Ketones: negative x1, small x1.   Fasting blood glucoses: 100% in target.  After breakfast: 100% in target.  Before lunch: n/a% in target.  After lunch: 67% in target.  Before dinner: n/a% in target.  After dinner: 100% in target.    Patient reports no questions/concerns.  States she has not yet picked up/started Novolog before dinner meal.  States she changed the tortillas she purchases and consumes.  Given all post dinner glucoses in target with change in tortilla and no Novolog, instructed patient to hold Novolog for now.  Patient stated understanding.  Discussed need to have carbohydrate at HS snack.     Plan/Response:  -DO NOT start Novolog before dinner meal  -continue NPH as 26 units/day at HS  -IF having  hard boiled egg + salad for HS snack, add 1 cup of milk  -virtual follow up with Marisol on 4/25/25 at 8 AM    Marisol Strange, MPH, RD, CDCES, LD 4/21/2025  Time Spent: 13 minutes    Any diabetes medication dose changes were made via the CDE Protocol and Collaborative Practice Agreement with the patient's referring provider. A copy of this encounter was shared with the provider.

## 2025-04-21 NOTE — LETTER
4/21/2025         RE: Asmita Frye  [address] [facility] [place] [address]        Dear Colleague,    Thank you for referring your patient, Asmita Frye, to the [facility]. Please see a copy of my visit note below.    Diabetes and Pregnancy Follow-up  Type of Service: Video Visit/ 13 minutes     Originating Location (Patient Location): Home  Distant Location (Provider Location): [facility]   Mode of Communication:  Video  [facility]  used for the duration of the visit    Visit Start Time: 8:03 AM  Visit End Time: 8:16 AM     How would patient like to obtain AVS? not needed      Subjective/Objective:    Asmita Frye was called for a scheduled BG review. Last date of communication was: 4/18/25.    Gestational diabetes is being managed with medications    Taking diabetes medications: yes:     Diabetes Medication(s)       Insulin       insulin aspart (NOVOLOG FLEXPEN) 100 UNIT/ML pen Inject 3 Units subcutaneously daily (before supper).  NOT YET STARTED     insulin  UNIT/ML vial Inject 26 Units subcutaneously daily. Increase as instructed. Max dose: 42 units daily.            Estimated Date of Delivery: May 9, 2025    Blood Glucose/Ketone Log:    Date Ketones Fasting Post Breakfast Post Lunch Post Supper   4/18   127 149 134   4/19  91 124 116 106 (salad and hard boiled egg for HS snack)   4/20 small 85 89 133 97   4/21 negative 94      Checking 1 hour post meals    Assessment:  Ketones: negative x1, small x1.   Fasting blood glucoses: 100% in target.  After breakfast: 100% in target.  Before lunch: n/a% in target.  After lunch: 67% in target.  Before dinner: n/a% in target.  After dinner: 100% in target.    Patient reports no questions/concerns.  States she has not yet picked up/started Novolog before dinner meal.  States she changed the tortillas she purchases and consumes.  Given all post dinner glucoses in target with change in tortilla  and no Novolog, instructed patient to hold Novolog for now.  Patient stated understanding.  Discussed need to have carbohydrate at HS snack.     Plan/Response:  -DO NOT start Novolog before dinner meal  -continue NPH as 26 units/day at HS  -IF having hard boiled egg + salad for HS snack, add 1 cup of milk  -virtual follow up with Marisol on 4/25/25 at 8 AM    Marisol Strange, MPH, RD, CDCES, LD 4/21/2025  Time Spent: 13 minutes    Any diabetes medication dose changes were made via the CDE Protocol and Collaborative Practice Agreement with the patient's referring provider. A copy of this encounter was shared with the provider.

## 2025-04-23 ENCOUNTER — PRENATAL OFFICE VISIT (OUTPATIENT)
Dept: OBGYN | Facility: CLINIC | Age: 35
End: 2025-04-23
Payer: COMMERCIAL

## 2025-04-23 VITALS
HEART RATE: 98 BPM | SYSTOLIC BLOOD PRESSURE: 116 MMHG | TEMPERATURE: 97.8 F | WEIGHT: 216.4 LBS | HEIGHT: 61 IN | RESPIRATION RATE: 18 BRPM | DIASTOLIC BLOOD PRESSURE: 72 MMHG | BODY MASS INDEX: 40.86 KG/M2

## 2025-04-23 DIAGNOSIS — Z34.83 PRENATAL CARE, SUBSEQUENT PREGNANCY IN THIRD TRIMESTER: ICD-10-CM

## 2025-04-23 DIAGNOSIS — Z98.891 HISTORY OF CESAREAN SECTION: ICD-10-CM

## 2025-04-23 DIAGNOSIS — O09.523 MULTIGRAVIDA OF ADVANCED MATERNAL AGE IN THIRD TRIMESTER: ICD-10-CM

## 2025-04-23 DIAGNOSIS — E66.812 CLASS 2 OBESITY WITHOUT SERIOUS COMORBIDITY WITH BODY MASS INDEX (BMI) OF 38.0 TO 38.9 IN ADULT, UNSPECIFIED OBESITY TYPE: Primary | ICD-10-CM

## 2025-04-23 DIAGNOSIS — O24.414 INSULIN CONTROLLED GESTATIONAL DIABETES MELLITUS (GDM) IN THIRD TRIMESTER: ICD-10-CM

## 2025-04-23 DIAGNOSIS — Z86.32 HISTORY OF GESTATIONAL DIABETES: ICD-10-CM

## 2025-04-23 NOTE — PROGRESS NOTES
"[facility]    Return OB Note    CC: Return OB     Subjective:  Asmita is a 35 year old  at 37w5d   Denies vaginal bleeding, loss of fluid, or regular contractions. Good fetal movement.  Complaints today: None    Objective:  /72 (BP Location: Right arm, Patient Position: Sitting, Cuff Size: Adult Large)   Pulse 98   Temp 97.8  F (36.6  C) (Tympanic)   Resp 18   Ht 1.549 m (5' 1\")   Wt 98.2 kg (216 lb 6.4 oz)   LMP 2024   BMI 40.89 kg/m      Fundal height: 39cm  FHT: 145bpm    Assessment/Plan:   Encounter Diagnoses   Name Primary?    Class 2 obesity without serious comorbidity with body mass index (BMI) of 38.0 to 38.9 in adult, unspecified obesity type Yes    Insulin controlled gestational diabetes mellitus (GDM) in third trimester     Prenatal care, subsequent pregnancy in third trimester     Multigravida of advanced maternal age in third trimester     History of gestational diabetes     History of  section        IUP at 37w5d  -NOB labs WNL  -Prenatal screening: declines  -Anatomy US: L2 WNL  -Mid trimester labs: gestational diabetes, anemia  -Planning to breast feed, has a pump  -Declined Tdap  -GBS positive     Co-Morbidities/Complications/Concerns:   -S>D: needs a follow up growth US, order has been placed  -GDMA2: started on insulin, NPH at night, continuing to titrate dosage. Will continue to monitor and assess insulin requirements. Will need serial growth US and twice weekly BPPs at 32 weeks, ordered, hasn't been doing this. Recommend at least one more US prior to delivery, which she will schedule today. Plan delivery at 39 weeks. Last growth US 3/20 EFW 76%, AC 88%  -Iron deficiency anemia: taking PO iron, Hb improved 10.7-> 11.0  -Hx C section x3: plan C section with tubal for delivery,  with KB.   -AMA: S/p L2  -Obesity: BMI 38.Plan growth US at 32 weeks, BPPs at 37 weeks.   -2 risk factors for pre-e: recommend ASA   -Strict return " precautions given    RTC post partum    Melodie Sesay DO

## 2025-04-28 ENCOUNTER — HOSPITAL ENCOUNTER (OUTPATIENT)
Dept: ULTRASOUND IMAGING | Facility: CLINIC | Age: 35
Discharge: HOME | End: 2025-04-28
Attending: OBSTETRICS & GYNECOLOGY | Admitting: OBSTETRICS & GYNECOLOGY
Payer: COMMERCIAL

## 2025-04-28 DIAGNOSIS — O24.414 INSULIN CONTROLLED GESTATIONAL DIABETES MELLITUS (GDM) IN THIRD TRIMESTER: ICD-10-CM

## 2025-04-28 PROCEDURE — 76816 OB US FOLLOW-UP PER FETUS: CPT

## 2025-04-29 ENCOUNTER — ANESTHESIA EVENT (OUTPATIENT)
Dept: SURGERY | Facility: CLINIC | Age: 35
End: 2025-04-29
Payer: COMMERCIAL

## 2025-04-29 ENCOUNTER — HOSPITAL ENCOUNTER (INPATIENT)
Facility: CLINIC | Age: 35
End: 2025-04-29
Attending: OBSTETRICS & GYNECOLOGY | Admitting: OBSTETRICS & GYNECOLOGY
Payer: COMMERCIAL

## 2025-04-29 ENCOUNTER — ANESTHESIA (OUTPATIENT)
Dept: SURGERY | Facility: CLINIC | Age: 35
End: 2025-04-29
Payer: COMMERCIAL

## 2025-04-29 ENCOUNTER — PREP FOR PROCEDURE (OUTPATIENT)
Dept: OBGYN | Facility: CLINIC | Age: 35
End: 2025-04-29

## 2025-04-29 ENCOUNTER — TELEPHONE (OUTPATIENT)
Dept: OBGYN | Facility: CLINIC | Age: 35
End: 2025-04-29

## 2025-04-29 DIAGNOSIS — Z30.2 ENCOUNTER FOR STERILIZATION: ICD-10-CM

## 2025-04-29 DIAGNOSIS — O34.219 HISTORY OF CESAREAN SECTION COMPLICATING PREGNANCY: Primary | ICD-10-CM

## 2025-04-29 DIAGNOSIS — Z98.891 S/P REPEAT LOW TRANSVERSE C-SECTION: ICD-10-CM

## 2025-04-29 PROBLEM — Z98.890 POSTOPERATIVE STATE: Status: ACTIVE | Noted: 2025-04-29

## 2025-04-29 LAB
ABO + RH BLD: NORMAL
ALBUMIN SERPL BCG-MCNC: 3.4 G/DL (ref 3.5–5.2)
ALP SERPL-CCNC: 155 U/L (ref 40–150)
ALT SERPL W P-5'-P-CCNC: 11 U/L (ref 0–50)
ANION GAP SERPL CALCULATED.3IONS-SCNC: 11 MMOL/L (ref 7–15)
AST SERPL W P-5'-P-CCNC: 14 U/L (ref 0–45)
BILIRUB SERPL-MCNC: 0.2 MG/DL
BLD GP AB SCN SERPL QL: NEGATIVE
BUN SERPL-MCNC: 11 MG/DL (ref 6–20)
CALCIUM SERPL-MCNC: 9.1 MG/DL (ref 8.8–10.4)
CHLORIDE SERPL-SCNC: 105 MMOL/L (ref 98–107)
CREAT SERPL-MCNC: 0.48 MG/DL (ref 0.51–0.95)
EGFRCR SERPLBLD CKD-EPI 2021: >90 ML/MIN/1.73M2
ERYTHROCYTE [DISTWIDTH] IN BLOOD BY AUTOMATED COUNT: 14.6 % (ref 10–15)
EST. AVERAGE GLUCOSE BLD GHB EST-MCNC: 97 MG/DL
GLUCOSE SERPL-MCNC: 75 MG/DL (ref 70–99)
HBA1C MFR BLD: 5 %
HCO3 SERPL-SCNC: 21 MMOL/L (ref 22–29)
HCT VFR BLD AUTO: 36.6 % (ref 35–47)
HGB BLD-MCNC: 12 G/DL (ref 11.7–15.7)
MCH RBC QN AUTO: 30.6 PG (ref 26.5–33)
MCHC RBC AUTO-ENTMCNC: 32.8 G/DL (ref 31.5–36.5)
MCV RBC AUTO: 93 FL (ref 78–100)
PLATELET # BLD AUTO: 232 10E3/UL (ref 150–450)
POTASSIUM SERPL-SCNC: 3.6 MMOL/L (ref 3.4–5.3)
PROT SERPL-MCNC: 6.8 G/DL (ref 6.4–8.3)
RBC # BLD AUTO: 3.92 10E6/UL (ref 3.8–5.2)
SODIUM SERPL-SCNC: 137 MMOL/L (ref 135–145)
SPECIMEN EXP DATE BLD: NORMAL
WBC # BLD AUTO: 7.3 10E3/UL (ref 4–11)

## 2025-04-29 PROCEDURE — 86780 TREPONEMA PALLIDUM: CPT | Performed by: OBSTETRICS & GYNECOLOGY

## 2025-04-29 PROCEDURE — 250N000013 HC RX MED GY IP 250 OP 250 PS 637: Performed by: OBSTETRICS & GYNECOLOGY

## 2025-04-29 PROCEDURE — 59515 CESAREAN DELIVERY: CPT | Performed by: OBSTETRICS & GYNECOLOGY

## 2025-04-29 PROCEDURE — 250N000011 HC RX IP 250 OP 636: Mod: JZ | Performed by: OBSTETRICS & GYNECOLOGY

## 2025-04-29 PROCEDURE — 83036 HEMOGLOBIN GLYCOSYLATED A1C: CPT | Performed by: OBSTETRICS & GYNECOLOGY

## 2025-04-29 PROCEDURE — 120N000001 HC R&B MED SURG/OB

## 2025-04-29 PROCEDURE — 360N000076 HC SURGERY LEVEL 3, PER MIN: Performed by: OBSTETRICS & GYNECOLOGY

## 2025-04-29 PROCEDURE — G0463 HOSPITAL OUTPT CLINIC VISIT: HCPCS | Mod: 25

## 2025-04-29 PROCEDURE — 250N000011 HC RX IP 250 OP 636: Mod: JZ | Performed by: NURSE ANESTHETIST, CERTIFIED REGISTERED

## 2025-04-29 PROCEDURE — 58611 LIGATE OVIDUCT(S) ADD-ON: CPT | Mod: AS | Performed by: PHYSICIAN ASSISTANT

## 2025-04-29 PROCEDURE — 271N000001 HC OR GENERAL SUPPLY NON-STERILE: Performed by: OBSTETRICS & GYNECOLOGY

## 2025-04-29 PROCEDURE — 58611 LIGATE OVIDUCT(S) ADD-ON: CPT | Performed by: OBSTETRICS & GYNECOLOGY

## 2025-04-29 PROCEDURE — 88302 TISSUE EXAM BY PATHOLOGIST: CPT | Mod: 26 | Performed by: PATHOLOGY

## 2025-04-29 PROCEDURE — 84155 ASSAY OF PROTEIN SERUM: CPT | Performed by: OBSTETRICS & GYNECOLOGY

## 2025-04-29 PROCEDURE — 0UT70ZZ RESECTION OF BILATERAL FALLOPIAN TUBES, OPEN APPROACH: ICD-10-PCS | Performed by: OBSTETRICS & GYNECOLOGY

## 2025-04-29 PROCEDURE — 250N000011 HC RX IP 250 OP 636: Performed by: OBSTETRICS & GYNECOLOGY

## 2025-04-29 PROCEDURE — 272N000001 HC OR GENERAL SUPPLY STERILE: Performed by: OBSTETRICS & GYNECOLOGY

## 2025-04-29 PROCEDURE — 258N000003 HC RX IP 258 OP 636: Performed by: NURSE ANESTHETIST, CERTIFIED REGISTERED

## 2025-04-29 PROCEDURE — 250N000009 HC RX 250: Performed by: OBSTETRICS & GYNECOLOGY

## 2025-04-29 PROCEDURE — 86901 BLOOD TYPING SEROLOGIC RH(D): CPT | Performed by: OBSTETRICS & GYNECOLOGY

## 2025-04-29 PROCEDURE — 85018 HEMOGLOBIN: CPT | Performed by: OBSTETRICS & GYNECOLOGY

## 2025-04-29 PROCEDURE — 59514 CESAREAN DELIVERY ONLY: CPT | Mod: AS | Performed by: PHYSICIAN ASSISTANT

## 2025-04-29 PROCEDURE — 88302 TISSUE EXAM BY PATHOLOGIST: CPT | Mod: TC | Performed by: OBSTETRICS & GYNECOLOGY

## 2025-04-29 PROCEDURE — 710N000009 HC RECOVERY PHASE 1, LEVEL 1, PER MIN: Performed by: OBSTETRICS & GYNECOLOGY

## 2025-04-29 PROCEDURE — 258N000003 HC RX IP 258 OP 636: Performed by: OBSTETRICS & GYNECOLOGY

## 2025-04-29 PROCEDURE — 370N000017 HC ANESTHESIA TECHNICAL FEE, PER MIN: Performed by: OBSTETRICS & GYNECOLOGY

## 2025-04-29 RX ORDER — LIDOCAINE 40 MG/G
CREAM TOPICAL
Status: DISCONTINUED | OUTPATIENT
Start: 2025-04-29 | End: 2025-05-02 | Stop reason: HOSPADM

## 2025-04-29 RX ORDER — OXYTOCIN/0.9 % SODIUM CHLORIDE 30/500 ML
340 PLASTIC BAG, INJECTION (ML) INTRAVENOUS CONTINUOUS PRN
Status: DISCONTINUED | OUTPATIENT
Start: 2025-04-29 | End: 2025-04-29 | Stop reason: HOSPADM

## 2025-04-29 RX ORDER — LIDOCAINE 40 MG/G
CREAM TOPICAL
Status: DISCONTINUED | OUTPATIENT
Start: 2025-04-29 | End: 2025-04-29 | Stop reason: HOSPADM

## 2025-04-29 RX ORDER — SODIUM CHLORIDE, SODIUM LACTATE, POTASSIUM CHLORIDE, CALCIUM CHLORIDE 600; 310; 30; 20 MG/100ML; MG/100ML; MG/100ML; MG/100ML
INJECTION, SOLUTION INTRAVENOUS CONTINUOUS PRN
Status: DISCONTINUED | OUTPATIENT
Start: 2025-04-29 | End: 2025-04-30

## 2025-04-29 RX ORDER — DEXAMETHASONE SODIUM PHOSPHATE 4 MG/ML
INJECTION, SOLUTION INTRA-ARTICULAR; INTRALESIONAL; INTRAMUSCULAR; INTRAVENOUS; SOFT TISSUE PRN
Status: DISCONTINUED | OUTPATIENT
Start: 2025-04-29 | End: 2025-04-29

## 2025-04-29 RX ORDER — PANTOPRAZOLE SODIUM 40 MG/1
40 TABLET, DELAYED RELEASE ORAL 2 TIMES DAILY
Status: DISCONTINUED | OUTPATIENT
Start: 2025-04-30 | End: 2025-05-02 | Stop reason: HOSPADM

## 2025-04-29 RX ORDER — KETOROLAC TROMETHAMINE 15 MG/ML
15 INJECTION, SOLUTION INTRAMUSCULAR; INTRAVENOUS EVERY 6 HOURS
Status: COMPLETED | OUTPATIENT
Start: 2025-04-30 | End: 2025-04-30

## 2025-04-29 RX ORDER — METOCLOPRAMIDE 10 MG/1
10 TABLET ORAL EVERY 6 HOURS PRN
Status: DISCONTINUED | OUTPATIENT
Start: 2025-04-29 | End: 2025-05-02 | Stop reason: HOSPADM

## 2025-04-29 RX ORDER — OXYTOCIN 10 [USP'U]/ML
10 INJECTION, SOLUTION INTRAMUSCULAR; INTRAVENOUS
Status: DISCONTINUED | OUTPATIENT
Start: 2025-04-29 | End: 2025-04-30

## 2025-04-29 RX ORDER — TRANEXAMIC ACID 10 MG/ML
1 INJECTION, SOLUTION INTRAVENOUS EVERY 30 MIN PRN
Status: DISCONTINUED | OUTPATIENT
Start: 2025-04-29 | End: 2025-05-02 | Stop reason: HOSPADM

## 2025-04-29 RX ORDER — METHYLERGONOVINE MALEATE 0.2 MG/ML
200 INJECTION INTRAVENOUS
Status: DISCONTINUED | OUTPATIENT
Start: 2025-04-29 | End: 2025-05-02 | Stop reason: HOSPADM

## 2025-04-29 RX ORDER — CEFAZOLIN SODIUM/WATER 2 G/20 ML
2 SYRINGE (ML) INTRAVENOUS
Status: COMPLETED | OUTPATIENT
Start: 2025-04-29 | End: 2025-04-29

## 2025-04-29 RX ORDER — HYDROCORTISONE 25 MG/G
CREAM TOPICAL 3 TIMES DAILY PRN
Status: DISCONTINUED | OUTPATIENT
Start: 2025-04-29 | End: 2025-05-02 | Stop reason: HOSPADM

## 2025-04-29 RX ORDER — SIMETHICONE 80 MG
80 TABLET,CHEWABLE ORAL 4 TIMES DAILY PRN
Status: DISCONTINUED | OUTPATIENT
Start: 2025-04-29 | End: 2025-05-02 | Stop reason: HOSPADM

## 2025-04-29 RX ORDER — ACETAMINOPHEN 325 MG/1
975 TABLET ORAL ONCE
Status: COMPLETED | OUTPATIENT
Start: 2025-04-29 | End: 2025-04-29

## 2025-04-29 RX ORDER — NALBUPHINE HYDROCHLORIDE 10 MG/ML
2.5-5 INJECTION INTRAMUSCULAR; INTRAVENOUS; SUBCUTANEOUS EVERY 6 HOURS PRN
Status: DISCONTINUED | OUTPATIENT
Start: 2025-04-29 | End: 2025-04-30

## 2025-04-29 RX ORDER — HYDROMORPHONE HCL IN WATER/PF 6 MG/30 ML
0.2 PATIENT CONTROLLED ANALGESIA SYRINGE INTRAVENOUS EVERY 5 MIN PRN
Status: DISCONTINUED | OUTPATIENT
Start: 2025-04-29 | End: 2025-04-29

## 2025-04-29 RX ORDER — ONDANSETRON 2 MG/ML
4 INJECTION INTRAMUSCULAR; INTRAVENOUS EVERY 6 HOURS PRN
Status: DISCONTINUED | OUTPATIENT
Start: 2025-04-29 | End: 2025-04-29 | Stop reason: HOSPADM

## 2025-04-29 RX ORDER — AMOXICILLIN 250 MG
1 CAPSULE ORAL 2 TIMES DAILY
Status: DISCONTINUED | OUTPATIENT
Start: 2025-04-29 | End: 2025-05-02 | Stop reason: HOSPADM

## 2025-04-29 RX ORDER — NICOTINE POLACRILEX 4 MG
15-30 LOZENGE BUCCAL
Status: DISCONTINUED | OUTPATIENT
Start: 2025-04-29 | End: 2025-05-02 | Stop reason: HOSPADM

## 2025-04-29 RX ORDER — ACETAMINOPHEN 325 MG/1
975 TABLET ORAL EVERY 6 HOURS
Status: DISCONTINUED | OUTPATIENT
Start: 2025-04-29 | End: 2025-05-02 | Stop reason: HOSPADM

## 2025-04-29 RX ORDER — OXYTOCIN 10 [USP'U]/ML
10 INJECTION, SOLUTION INTRAMUSCULAR; INTRAVENOUS
Status: DISCONTINUED | OUTPATIENT
Start: 2025-04-29 | End: 2025-04-29 | Stop reason: HOSPADM

## 2025-04-29 RX ORDER — IBUPROFEN 800 MG/1
800 TABLET, FILM COATED ORAL EVERY 6 HOURS
Status: DISCONTINUED | OUTPATIENT
Start: 2025-04-30 | End: 2025-05-02 | Stop reason: HOSPADM

## 2025-04-29 RX ORDER — ONDANSETRON 4 MG/1
4 TABLET, ORALLY DISINTEGRATING ORAL EVERY 30 MIN PRN
Status: DISCONTINUED | OUTPATIENT
Start: 2025-04-29 | End: 2025-04-29

## 2025-04-29 RX ORDER — CARBOPROST TROMETHAMINE 250 UG/ML
250 INJECTION, SOLUTION INTRAMUSCULAR
Status: DISCONTINUED | OUTPATIENT
Start: 2025-04-29 | End: 2025-05-02 | Stop reason: HOSPADM

## 2025-04-29 RX ORDER — ONDANSETRON 4 MG/1
4 TABLET, ORALLY DISINTEGRATING ORAL EVERY 6 HOURS PRN
Status: DISCONTINUED | OUTPATIENT
Start: 2025-04-29 | End: 2025-04-29 | Stop reason: HOSPADM

## 2025-04-29 RX ORDER — CITRIC ACID/SODIUM CITRATE 334-500MG
30 SOLUTION, ORAL ORAL
Status: COMPLETED | OUTPATIENT
Start: 2025-04-29 | End: 2025-04-29

## 2025-04-29 RX ORDER — CARBOPROST TROMETHAMINE 250 UG/ML
250 INJECTION, SOLUTION INTRAMUSCULAR
Status: DISCONTINUED | OUTPATIENT
Start: 2025-04-29 | End: 2025-04-29 | Stop reason: HOSPADM

## 2025-04-29 RX ORDER — NALOXONE HYDROCHLORIDE 0.4 MG/ML
0.4 INJECTION, SOLUTION INTRAMUSCULAR; INTRAVENOUS; SUBCUTANEOUS
Status: DISCONTINUED | OUTPATIENT
Start: 2025-04-29 | End: 2025-04-30

## 2025-04-29 RX ORDER — MEPERIDINE HYDROCHLORIDE 25 MG/ML
12.5 INJECTION INTRAMUSCULAR; INTRAVENOUS; SUBCUTANEOUS EVERY 5 MIN PRN
Status: DISCONTINUED | OUTPATIENT
Start: 2025-04-29 | End: 2025-04-29

## 2025-04-29 RX ORDER — SODIUM CHLORIDE, SODIUM LACTATE, POTASSIUM CHLORIDE, CALCIUM CHLORIDE 600; 310; 30; 20 MG/100ML; MG/100ML; MG/100ML; MG/100ML
INJECTION, SOLUTION INTRAVENOUS CONTINUOUS
Status: DISCONTINUED | OUTPATIENT
Start: 2025-04-29 | End: 2025-05-02 | Stop reason: HOSPADM

## 2025-04-29 RX ORDER — MISOPROSTOL 200 UG/1
400 TABLET ORAL
Status: DISCONTINUED | OUTPATIENT
Start: 2025-04-29 | End: 2025-05-02 | Stop reason: HOSPADM

## 2025-04-29 RX ORDER — ONDANSETRON 4 MG/1
4 TABLET, ORALLY DISINTEGRATING ORAL EVERY 6 HOURS PRN
Status: DISCONTINUED | OUTPATIENT
Start: 2025-04-29 | End: 2025-05-02 | Stop reason: HOSPADM

## 2025-04-29 RX ORDER — NALOXONE HYDROCHLORIDE 0.4 MG/ML
0.2 INJECTION, SOLUTION INTRAMUSCULAR; INTRAVENOUS; SUBCUTANEOUS
Status: DISCONTINUED | OUTPATIENT
Start: 2025-04-29 | End: 2025-04-30

## 2025-04-29 RX ORDER — KETOROLAC TROMETHAMINE 30 MG/ML
INJECTION, SOLUTION INTRAMUSCULAR; INTRAVENOUS PRN
Status: DISCONTINUED | OUTPATIENT
Start: 2025-04-29 | End: 2025-04-29

## 2025-04-29 RX ORDER — METHYLERGONOVINE MALEATE 0.2 MG/ML
200 INJECTION INTRAVENOUS
Status: DISCONTINUED | OUTPATIENT
Start: 2025-04-29 | End: 2025-04-29 | Stop reason: HOSPADM

## 2025-04-29 RX ORDER — BISACODYL 10 MG
10 SUPPOSITORY, RECTAL RECTAL DAILY PRN
Status: DISCONTINUED | OUTPATIENT
Start: 2025-05-01 | End: 2025-05-02 | Stop reason: HOSPADM

## 2025-04-29 RX ORDER — CEFAZOLIN SODIUM/WATER 2 G/20 ML
2 SYRINGE (ML) INTRAVENOUS SEE ADMIN INSTRUCTIONS
Status: DISCONTINUED | OUTPATIENT
Start: 2025-04-29 | End: 2025-04-29 | Stop reason: HOSPADM

## 2025-04-29 RX ORDER — TRANEXAMIC ACID 10 MG/ML
1 INJECTION, SOLUTION INTRAVENOUS EVERY 30 MIN PRN
Status: DISCONTINUED | OUTPATIENT
Start: 2025-04-29 | End: 2025-04-29 | Stop reason: HOSPADM

## 2025-04-29 RX ORDER — AMOXICILLIN 250 MG
2 CAPSULE ORAL 2 TIMES DAILY
Status: DISCONTINUED | OUTPATIENT
Start: 2025-04-29 | End: 2025-05-02 | Stop reason: HOSPADM

## 2025-04-29 RX ORDER — MISOPROSTOL 200 UG/1
800 TABLET ORAL
Status: DISCONTINUED | OUTPATIENT
Start: 2025-04-29 | End: 2025-04-29 | Stop reason: HOSPADM

## 2025-04-29 RX ORDER — SODIUM PHOSPHATE,MONO-DIBASIC 19G-7G/118
1 ENEMA (ML) RECTAL DAILY PRN
Status: DISCONTINUED | OUTPATIENT
Start: 2025-05-01 | End: 2025-05-02 | Stop reason: HOSPADM

## 2025-04-29 RX ORDER — BUPIVACAINE HYDROCHLORIDE 7.5 MG/ML
INJECTION, SOLUTION INTRASPINAL
Status: COMPLETED | OUTPATIENT
Start: 2025-04-29 | End: 2025-04-29

## 2025-04-29 RX ORDER — SODIUM CHLORIDE, SODIUM LACTATE, POTASSIUM CHLORIDE, CALCIUM CHLORIDE 600; 310; 30; 20 MG/100ML; MG/100ML; MG/100ML; MG/100ML
INJECTION, SOLUTION INTRAVENOUS CONTINUOUS
Status: DISCONTINUED | OUTPATIENT
Start: 2025-04-29 | End: 2025-04-29 | Stop reason: HOSPADM

## 2025-04-29 RX ORDER — LOPERAMIDE HYDROCHLORIDE 2 MG/1
4 CAPSULE ORAL
Status: DISCONTINUED | OUTPATIENT
Start: 2025-04-29 | End: 2025-04-29 | Stop reason: HOSPADM

## 2025-04-29 RX ORDER — ALBUTEROL SULFATE 0.83 MG/ML
2.5 SOLUTION RESPIRATORY (INHALATION) EVERY 4 HOURS PRN
Status: CANCELLED | OUTPATIENT
Start: 2025-04-29

## 2025-04-29 RX ORDER — METOCLOPRAMIDE HYDROCHLORIDE 5 MG/ML
10 INJECTION INTRAMUSCULAR; INTRAVENOUS EVERY 6 HOURS PRN
Status: DISCONTINUED | OUTPATIENT
Start: 2025-04-29 | End: 2025-05-02 | Stop reason: HOSPADM

## 2025-04-29 RX ORDER — ONDANSETRON 2 MG/ML
4 INJECTION INTRAMUSCULAR; INTRAVENOUS EVERY 6 HOURS PRN
Status: DISCONTINUED | OUTPATIENT
Start: 2025-04-29 | End: 2025-05-02 | Stop reason: HOSPADM

## 2025-04-29 RX ORDER — METOCLOPRAMIDE 10 MG/1
10 TABLET ORAL EVERY 6 HOURS PRN
Status: DISCONTINUED | OUTPATIENT
Start: 2025-04-29 | End: 2025-04-29 | Stop reason: HOSPADM

## 2025-04-29 RX ORDER — MISOPROSTOL 200 UG/1
800 TABLET ORAL
Status: DISCONTINUED | OUTPATIENT
Start: 2025-04-29 | End: 2025-05-02 | Stop reason: HOSPADM

## 2025-04-29 RX ORDER — MISOPROSTOL 200 UG/1
400 TABLET ORAL
Status: DISCONTINUED | OUTPATIENT
Start: 2025-04-29 | End: 2025-04-29 | Stop reason: HOSPADM

## 2025-04-29 RX ORDER — OXYCODONE HYDROCHLORIDE 5 MG/1
5 TABLET ORAL EVERY 4 HOURS PRN
Status: DISCONTINUED | OUTPATIENT
Start: 2025-04-29 | End: 2025-05-02 | Stop reason: HOSPADM

## 2025-04-29 RX ORDER — PROCHLORPERAZINE MALEATE 5 MG/1
10 TABLET ORAL EVERY 6 HOURS PRN
Status: DISCONTINUED | OUTPATIENT
Start: 2025-04-29 | End: 2025-05-02 | Stop reason: HOSPADM

## 2025-04-29 RX ORDER — MORPHINE SULFATE 1 MG/ML
INJECTION, SOLUTION EPIDURAL; INTRATHECAL; INTRAVENOUS
Status: COMPLETED | OUTPATIENT
Start: 2025-04-29 | End: 2025-04-29

## 2025-04-29 RX ORDER — LOPERAMIDE HYDROCHLORIDE 2 MG/1
4 CAPSULE ORAL
Status: DISCONTINUED | OUTPATIENT
Start: 2025-04-29 | End: 2025-05-02 | Stop reason: HOSPADM

## 2025-04-29 RX ORDER — ONDANSETRON 2 MG/ML
4 INJECTION INTRAMUSCULAR; INTRAVENOUS EVERY 30 MIN PRN
Status: DISCONTINUED | OUTPATIENT
Start: 2025-04-29 | End: 2025-04-29

## 2025-04-29 RX ORDER — DEXTROSE, SODIUM CHLORIDE, SODIUM LACTATE, POTASSIUM CHLORIDE, AND CALCIUM CHLORIDE 5; .6; .31; .03; .02 G/100ML; G/100ML; G/100ML; G/100ML; G/100ML
INJECTION, SOLUTION INTRAVENOUS CONTINUOUS
Status: DISCONTINUED | OUTPATIENT
Start: 2025-04-29 | End: 2025-05-02 | Stop reason: HOSPADM

## 2025-04-29 RX ORDER — DEXTROSE MONOHYDRATE 25 G/50ML
25-50 INJECTION, SOLUTION INTRAVENOUS
Status: DISCONTINUED | OUTPATIENT
Start: 2025-04-29 | End: 2025-05-02 | Stop reason: HOSPADM

## 2025-04-29 RX ORDER — PROCHLORPERAZINE MALEATE 5 MG/1
10 TABLET ORAL EVERY 6 HOURS PRN
Status: DISCONTINUED | OUTPATIENT
Start: 2025-04-29 | End: 2025-04-29 | Stop reason: HOSPADM

## 2025-04-29 RX ORDER — NALOXONE HYDROCHLORIDE 0.4 MG/ML
0.1 INJECTION, SOLUTION INTRAMUSCULAR; INTRAVENOUS; SUBCUTANEOUS
Status: DISCONTINUED | OUTPATIENT
Start: 2025-04-29 | End: 2025-04-29

## 2025-04-29 RX ORDER — FENTANYL CITRATE-0.9 % NACL/PF 10 MCG/ML
100 PLASTIC BAG, INJECTION (ML) INTRAVENOUS EVERY 5 MIN PRN
Status: DISCONTINUED | OUTPATIENT
Start: 2025-04-29 | End: 2025-04-29 | Stop reason: HOSPADM

## 2025-04-29 RX ORDER — OXYTOCIN 10 [USP'U]/ML
10 INJECTION, SOLUTION INTRAMUSCULAR; INTRAVENOUS
Status: DISCONTINUED | OUTPATIENT
Start: 2025-04-29 | End: 2025-05-02 | Stop reason: HOSPADM

## 2025-04-29 RX ORDER — METOCLOPRAMIDE HYDROCHLORIDE 5 MG/ML
10 INJECTION INTRAMUSCULAR; INTRAVENOUS EVERY 6 HOURS PRN
Status: DISCONTINUED | OUTPATIENT
Start: 2025-04-29 | End: 2025-04-29 | Stop reason: HOSPADM

## 2025-04-29 RX ORDER — LOPERAMIDE HYDROCHLORIDE 2 MG/1
2 CAPSULE ORAL
Status: DISCONTINUED | OUTPATIENT
Start: 2025-04-29 | End: 2025-04-29 | Stop reason: HOSPADM

## 2025-04-29 RX ORDER — OXYTOCIN/0.9 % SODIUM CHLORIDE 30/500 ML
340 PLASTIC BAG, INJECTION (ML) INTRAVENOUS CONTINUOUS PRN
Status: DISCONTINUED | OUTPATIENT
Start: 2025-04-29 | End: 2025-05-02 | Stop reason: HOSPADM

## 2025-04-29 RX ORDER — DEXAMETHASONE SODIUM PHOSPHATE 4 MG/ML
4 INJECTION, SOLUTION INTRA-ARTICULAR; INTRALESIONAL; INTRAMUSCULAR; INTRAVENOUS; SOFT TISSUE
Status: DISCONTINUED | OUTPATIENT
Start: 2025-04-29 | End: 2025-04-29

## 2025-04-29 RX ORDER — LOPERAMIDE HYDROCHLORIDE 2 MG/1
2 CAPSULE ORAL
Status: DISCONTINUED | OUTPATIENT
Start: 2025-04-29 | End: 2025-05-02 | Stop reason: HOSPADM

## 2025-04-29 RX ORDER — OXYTOCIN/0.9 % SODIUM CHLORIDE 30/500 ML
100-340 PLASTIC BAG, INJECTION (ML) INTRAVENOUS CONTINUOUS PRN
Status: DISCONTINUED | OUTPATIENT
Start: 2025-04-29 | End: 2025-04-30

## 2025-04-29 RX ADMIN — PHENYLEPHRINE HYDROCHLORIDE 200 MCG: 10 INJECTION INTRAVENOUS at 17:36

## 2025-04-29 RX ADMIN — Medication 2 G: at 16:35

## 2025-04-29 RX ADMIN — OXYCODONE 5 MG: 5 TABLET ORAL at 18:47

## 2025-04-29 RX ADMIN — PHENYLEPHRINE HYDROCHLORIDE 0.3 MCG/KG/MIN: 10 INJECTION INTRAVENOUS at 16:52

## 2025-04-29 RX ADMIN — BUPIVACAINE HYDROCHLORIDE IN DEXTROSE 1.6 ML: 7.5 INJECTION, SOLUTION SUBARACHNOID at 16:46

## 2025-04-29 RX ADMIN — DEXAMETHASONE SODIUM PHOSPHATE 4 MG: 4 INJECTION, SOLUTION INTRA-ARTICULAR; INTRALESIONAL; INTRAMUSCULAR; INTRAVENOUS; SOFT TISSUE at 18:00

## 2025-04-29 RX ADMIN — Medication 340 ML/HR: at 17:10

## 2025-04-29 RX ADMIN — ONDANSETRON 8 MG: 2 INJECTION INTRAMUSCULAR; INTRAVENOUS at 16:36

## 2025-04-29 RX ADMIN — Medication 0.15 MG: at 16:46

## 2025-04-29 RX ADMIN — ACETAMINOPHEN 975 MG: 325 TABLET, FILM COATED ORAL at 15:39

## 2025-04-29 RX ADMIN — METOCLOPRAMIDE 10 MG: 5 INJECTION, SOLUTION INTRAMUSCULAR; INTRAVENOUS at 20:01

## 2025-04-29 RX ADMIN — KETOROLAC TROMETHAMINE 30 MG: 30 INJECTION, SOLUTION INTRAMUSCULAR at 18:00

## 2025-04-29 RX ADMIN — SODIUM CITRATE AND CITRIC ACID MONOHYDRATE 30 ML: 500; 334 SOLUTION ORAL at 15:39

## 2025-04-29 RX ADMIN — SODIUM CHLORIDE, SODIUM LACTATE, POTASSIUM CHLORIDE, AND CALCIUM CHLORIDE 500 ML: .6; .31; .03; .02 INJECTION, SOLUTION INTRAVENOUS at 15:40

## 2025-04-29 RX ADMIN — SODIUM CHLORIDE, SODIUM LACTATE, POTASSIUM CHLORIDE, AND CALCIUM CHLORIDE: .6; .31; .03; .02 INJECTION, SOLUTION INTRAVENOUS at 16:36

## 2025-04-29 ASSESSMENT — ACTIVITIES OF DAILY LIVING (ADL)
ADLS_ACUITY_SCORE: 24
ADLS_ACUITY_SCORE: 27
ADLS_ACUITY_SCORE: 24

## 2025-04-29 ASSESSMENT — LIFESTYLE VARIABLES: TOBACCO_USE: 1

## 2025-04-29 NOTE — ANESTHESIA PROCEDURE NOTES
"Intrathecal injection Procedure Note    Pre-Procedure   Staff -        CRNA: Lupillo Minor APRN CRNA       Performed By: CRNA       Location: OR       Procedure Start/Stop Times: 4/29/2025 4:40 PM and 4/29/2025 4:46 PM       Pre-Anesthestic Checklist: patient identified, IV checked, risks and benefits discussed, informed consent, monitors and equipment checked, pre-op evaluation, at physician/surgeon's request and post-op pain management  Timeout:       Correct Patient: Yes        Correct Procedure: Yes        Correct Site: Yes        Correct Position: Yes   Procedure Documentation  Procedure: intrathecal injection         Patient Position: sitting       Patient Prep/Sterile Barriers: sterile gloves, mask, patient draped       Skin prep: Betadine       Insertion Site: L3-4. (midline approach).       Needle Gauge: 25.        Needle Length (Inches): 3.5        Spinal Needle Type: Pencan       Introducer used       Introducer: 20 G       # of attempts: 1 and  # of redirects:  0    Assessment/Narrative         Paresthesias: No.       CSF fluid: clear.       Opening pressure was cmH2O while  Sitting.      Medication(s) Administered   0.75% Hyperbaric Bupivacaine (Intrathecal) - Intrathecal   1.6 mL - 4/29/2025 4:46:00 PM  Morphine PF 1 mg/mL (Intrathecal) - Intrathecal   0.15 mg - 4/29/2025 4:46:00 PM  Medication Administration Time: 4/29/2025 4:40 PM      FOR [facility] [place] ONLY:   Pain Team Contact information: please page the Pain Team Via [employer]. Search \"Pain\". During daytime hours, please page the attending first. At night please page the resident first.      "

## 2025-04-29 NOTE — OP NOTE
[facility]  Section Operative Note    Patient Name: Asmita Frye  MRN:[id number]  : 1990  Date of Surgery: 25   Pre-operative diagnosis:  1. IUP at 38w4d  2. History of c/s x3, planned repeat  3. Desires sterilization   4. Spontaneous labor    Post-operative diagnosis:  Same   Procedure:  Repeat  section via pfannenstiel skin incision with double layer uterine closure, bilateral salpingectomy   Surgeon:  Dr. Barbi Tejeda (OB/GYN Attending Staff)    Assistant(s):  Mauricio Edmond - His assistance was required for retraction, instrument and suture handling as well as fundal pressure for the delivery of the baby. There were several indications for a skilled surgical assistant for this procedure:   1) higher orders repeat cesearean section (hx of c/s x3)  2) morbid obesity - BMI 41  3) significant adhesive disease noted on previous c/s op note    Anesthesia:  Spinal    Quantitative blood loss:  147 mL    Total IV fluids:  See anesthesia record   Drains:  Barcenas catheter   Specimens:  Bilateral fallopian tubes     Findings:  Moderately dense abdominal wall adhesions. Moderately dense intraabdominal adhesions of the bladder, omentum and uterus/lower uterine segment. Thin meconium-stained fluid with amniotomy. Liveborn, vigorous male infant born vertex, LOP. APGARS 9 and 9. Weight: 8lbs 3oz. Placenta appeared intact with a three vessel cord and no apparent gross pathology. Uterus appears normal and clear of any retained product. Thin BABS at 3mm. Normal bilateral fallopian tubes and ovaries. Hemostatic surgical site at the end of the case.    Complications:  None apparent    Condition:  Stable, transferred to PACU    Indication: Asmita Frye is a 35 year old  at 38w4d by 7w4d US [facility]. She has a history of c/s x3, planned repeat with bilateral salpingectomy for sterilization.   I reviewed the steps of the procedure as well  as the risks of bleeding, infection and intraabdominal injury. She again confirms with me her desire to be sterilized and understands that this is permanent and cannot be reversed.   Written informed consent was signed and she is agreeable to a blood transfusion if indicated. I reviewed her federal tubal papers and noted them to have been signed on 3/19/2025. I reviewed them and again signed them today.   Procedure:  After obtaining informed consent, the patient was taken to the operating room. She received 2 grams of ancef prior to the skin incision. She was placed in the dorsal supine position with a leftward tilt and prepped and draped in the usual sterile fashion. Following test of adequate spinal anesthesia, the abdomen was entered through a transverse skin incision through her previous scar. The skin incision was made sharply and carried through the subcutaneous tissue to the fascia.  Fascia was incised in the midline and extended laterally with the Cervantes scissors. The superior margin of the fascial incision was grasped with Kocher clamps and dissected from the underlying muscle sharp and blunt dissecton, which was then repeated at the lower margin of the fascial incision.  The muscle was  in the midline. The peritoneum was entered bluntly and the opening extended by digital dissection with care to avoid the bladder. Omental and bladder adhesions taken down with cautery. An kamilla-o retractor was placed. The lower segment of the uterus was opened sharply in a transverse fashion and extended with digital pressure. The infant's head was elevated to the level of the hysterotomy and was delivered atraumatically. The cord was doubly clamped and cut and the infant was handed off to the waiting nursing staff after 1 minute of delayed cord clamping. A segment of the cord was cut and set aside for cord gases if needed. The placenta was extracted via cord traction. The uterus was cleared of all clots and debris.  Oxytocin was given through the running IV. With vigorous massage as well as administration of oxytocin, good uterine tone was achieved. The hysterotomy was repaired with 0-vicryl suture in a running locked fashion. A 2nd layer of 0-monocryl was  used to imbricate the incision and good hemostasis was achieved. The hysterotomy was irrigated, inspected and found to have no active sites of bleeding. The fallopian tubes were each individually identified, walked to the fimbriated ends with babcocks, cauterized and ligated along the mesosalpinx to the cornua. Hemostasis assured. Mayuri powder and interceed were placed over the BABS. The abdominal wall was examined and several areas of bleeding were cauterized. Mayuri powder was placed here as well. The fascia was closed with a running suture of 0-vicryl. Subcutaneous tissue was irrigated. Areas that were oozing were controlled with cautery. The subcutaneous tissue was closed with interrupted plain gut. The skin was closed with 4-0 monocryl. The patient tolerated the procedure well and was taken to the recovery room in stable condition. All sponge, needle and instrument counts were correct x2.      Barbi Tejeda MD   OB/GYN

## 2025-04-29 NOTE — PROGRESS NOTES
[facility]: [facility].  Reason for maternal/fetal assessment is uterine contractions. Patient reports contractions that started this morning but got much stronger in the last 1-2 hours. Currently kellie every 2-3 minutes, palpating moderate. Patient appears uncomfortable with contractions and is unable to talk through them. Patient denies leaking of vaginal fluid/rupture of membranes. Patient reports fetal movement is normal. Patient is a 38w4d .  Prenatal record reviewed. Pregnancy has been complicated by diabetes, advanced maternal age (>=36yo), and obesity (pre-pregnancy BMI >=35).    Vital signs wnl. Support person is present.     Action: Verbal consent for EFM. Triage assessment completed.     Response: Patient verbalized agreement with plan. Will contact Dr. Tejeda with update and further orders.

## 2025-04-29 NOTE — ANESTHESIA CARE TRANSFER NOTE
Patient: Asmita Frye    Procedure: Procedure(s):  Repeat  SECTION, WITH bilateral salpingectomy       Diagnosis: History of  section complicating pregnancy [O34.219]  Encounter for sterilization [Z30.2]  Diagnosis Additional Information: No value filed.    Anesthesia Type:   Spinal     Note:    Oropharynx: oropharynx clear of all foreign objects and spontaneously breathing  Level of Consciousness: awake  Oxygen Supplementation: room air    Independent Airway: airway patency satisfactory and stable  Dentition: dentition unchanged  Vital Signs Stable: post-procedure vital signs reviewed and stable  Report to RN Given: handoff report given  Patient transferred to: Labor and Delivery    Handoff Report: Identifed the Patient, Identified the Reponsible Provider, Reviewed the pertinent medical history, Discussed the surgical course, Reviewed Intra-OP anesthesia mangement and issues during anesthesia, Set expectations for post-procedure period and Allowed opportunity for questions and acknowledgement of understanding      Vitals:  Vitals Value Taken Time   BP     Temp     Pulse     Resp     SpO2         Electronically Signed By: MERVAT Friedman CRNA  2025  6:07 PM

## 2025-04-29 NOTE — ANESTHESIA PREPROCEDURE EVALUATION
Anesthesia Pre-Procedure Evaluation    Patient: Asmita Frye   MRN: [id number] : 1990        Procedure : Procedure(s):   SECTION          Past Medical History:   Diagnosis Date     Chickenpox       Past Surgical History:   Procedure Laterality Date     APPENDECTOMY        SECTION N/A 2023    Procedure:  SECTION;  Surgeon: Melodie Sesay DO;  Location: [facility] OR     GYN SURGERY        No Known Allergies   Social History     Tobacco Use     Smoking status: Former     Current packs/day: 0.00     Types: Cigarettes     Quit date: 2023     Years since quittin.2     Smokeless tobacco: Never   Substance Use Topics     Alcohol use: Not Currently      Wt Readings from Last 1 Encounters:   25 98.2 kg (216 lb 6.4 oz)        Anesthesia Evaluation   Pt has had prior anesthetic. Type: General.        ROS/MED HX  ENT/Pulmonary:  - neg pulmonary ROS   (+)     ANNE risk factors,           tobacco use, Past use,                       Neurologic:  - neg neurologic ROS     Cardiovascular:  - neg cardiovascular ROS     METS/Exercise Tolerance:     Hematologic:  - neg hematologic  ROS     Musculoskeletal:  - neg musculoskeletal ROS     GI/Hepatic:     (+) GERD,        appendicitis,           Renal/Genitourinary:       Endo:     (+)  type II DM,             Obesity,       Psychiatric/Substance Use:  - neg psychiatric ROS     Infectious Disease:       Malignancy:       Other:  - neg other ROS          Physical Exam    Airway  airway exam normal      Mallampati: III   TM distance: > 3 FB   Neck ROM: full   Mouth opening: > 3 cm    Respiratory Devices and Support         Dental       (+) Minor Abnormalities - some fillings, tiny chips      Cardiovascular   cardiovascular exam normal       Rhythm and rate: regular and normal     Pulmonary   pulmonary exam normal        breath sounds clear to auscultation         OUTSIDE LABS:  CBC:   Lab Results   Component Value Date    WBC 7.3  "04/29/2025    WBC 7.4 03/19/2025    HGB 12.0 04/29/2025    HGB 11.0 (L) 03/19/2025    HCT 36.6 04/29/2025    HCT 33.0 (L) 03/19/2025     04/29/2025     03/19/2025     BMP:   Lab Results   Component Value Date     06/20/2024     12/21/2023    POTASSIUM 4.1 06/20/2024    POTASSIUM 3.6 12/21/2023    CHLORIDE 104 06/20/2024    CHLORIDE 102 12/21/2023    CO2 21 (L) 06/20/2024    CO2 22 12/21/2023    BUN 10.3 06/20/2024    BUN 8.3 12/21/2023    CR 0.57 06/20/2024    CR 0.52 12/21/2023     (H) 06/20/2024    GLC 99 12/22/2023     COAGS: No results found for: \"PTT\", \"INR\", \"FIBR\"  POC:   Lab Results   Component Value Date    HCG Negative 02/14/2024    HCGS Negative 06/20/2024     HEPATIC:   Lab Results   Component Value Date    ALBUMIN 4.3 06/20/2024    PROTTOTAL 8.0 06/20/2024    ALT 91 (H) 06/20/2024    AST 55 (H) 06/20/2024    ALKPHOS 104 06/20/2024    BILITOTAL 0.3 06/20/2024     OTHER:   Lab Results   Component Value Date    A1C 5.2 09/24/2024    FLYNN 9.6 06/20/2024    LIPASE 34 06/20/2024       Anesthesia Plan    ASA Status:  2, emergent    NPO Status:  NPO Appropriate    Anesthesia Type: Spinal.              Consents    Anesthesia Plan(s) and associated risks, benefits, and realistic alternatives discussed. Questions answered and patient/representative(s) expressed understanding.     - Discussed: Risks, Benefits and Alternatives for the PROCEDURE were discussed     - Discussed with:  Patient, Spouse      - Extended Intubation/Ventilatory Support Discussed: No.      - Patient is DNR/DNI Status: No     Use of blood products discussed: No .     Postoperative Care            Comments:           neg OB ROS.    Lupillo Minor, APRN CRNA    I have reviewed the pertinent notes and labs in the chart from the past 30 days and (re)examined the patient.  Any updates or changes from those notes are reflected in this note.                   "

## 2025-04-29 NOTE — ANESTHESIA POSTPROCEDURE EVALUATION
Patient: Asmita Frye    Procedure: Procedure(s):  Repeat  SECTION, WITH bilateral salpingectomy       Anesthesia Type:  Spinal    Note:  Disposition: Outpatient   Postop Pain Control: Uneventful            Sign Out: Well controlled pain   PONV: No   Neuro/Psych: Uneventful            Sign Out: Acceptable/Baseline neuro status   Airway/Respiratory: Uneventful            Sign Out: Acceptable/Baseline resp. status   CV/Hemodynamics: Uneventful            Sign Out: Acceptable CV status; No obvious hypovolemia; No obvious fluid overload   Other NRE: NONE   DID A NON-ROUTINE EVENT OCCUR? No           Last vitals:  Vitals:    25 1505   BP: 129/72   Pulse: 80   Resp: 20   Temp: 36.9  C (98.5  F)       Electronically Signed By: MERVAT Friedman CRNA  2025  6:18 PM

## 2025-04-29 NOTE — L&D DELIVERY NOTE
"Delivery Summary    Asmita Frye MRN# [id number]   Age: 35 year old YOB: 1990     ASSESSMENT & PLAN:   Asmita Frye is a 35 year old  at 38w4d by 7w4d US [facility]. She has a history of c/s x3, planned repeat with bilateral salpingectomy for sterilization.   I reviewed the steps of the procedure as well as the risks of bleeding, infection and intraabdominal injury. She again confirms with me her desire to be sterilized and understands that this is permanent and cannot be reversed.   Written informed consent was signed and she is agreeable to a blood transfusion if indicated. I reviewed her federal tubal papers and noted them to have been signed on 3/19/2025. I reviewed them and again signed them today.  Uncomplicated repeat  section with bilateral salpingectomy.   QBL 147cc.   \"Evaristo\"       Edmundo Frye, Male-Asmita [[id number]]      Labor Length      3rd Stage (hrs): 0 (min): 1          Rupture date/time:     Fluid color: Meconium     Augmentation: None       Delivery/Placenta Date and Time      Delivery Date: 25 Delivery Time:  5:10 PM   Placenta Date/Time: 2025  5:12 PM  Oxytocin given at the time of delivery: after delivery of baby  Delivering clinician: Barbi Tejeda MD   Other personnel present at delivery:  Provider Role   Hilda Monson RN Delivery Nurse   Filomena Aguilar RN Charge Nurse             Apgars    Living status: Living   1 Minute 5 Minute 10 Minute 15 Minute 20 Minute   Skin color: 1  1       Heart rate: 2  2       Reflex irritability: 2  2       Muscle tone: 2  2       Respiratory effort: 2  2       Total: 9  9       Apgars assigned by: HILDA MONSON RN       Cord      Vessels: 3 Vessels    Cord Complications: None               Cord Blood Disposition: Discard    Gases Sent?: No    Delayed cord clamping?: Yes    Cord Clamping Delay (seconds):  seconds    Stem cell collection?: No            " "Resuscitation    Methods: None  Output in Delivery Room: Voided       [place] Measurements      Weight: 8 lb 3 oz Length: 1' 8\"     Head circumference: 33.7 cm    Output in delivery room: Voided       Skin to Skin and Feeding Plan      Skin to skin initiation date/time: 1841      Skin to skin end date/time:            Labor Events and Shoulder Dystocia    Fetal Tracing Prior to Delivery: Category 1  Shoulder dystocia present?: Neg       Delivery (Maternal) (Provider to Complete) ([id number])    Episiotomy: None  Genital tract inspection done: Pos       Blood Loss  Mother: Asmita Mitchell #[id number]     Start of Mother's Information      Delivery Blood Loss   Intrapartum & Postpartum: 25 1701 - 25 1814    Delivery Admission: 25 1500 - 25         Intrapartum & Postpartum Delivery Admission    Total Surgical QBL Blood Loss (mL) Hospital Encounter 147 mL 147 mL    Total  147 mL 147 mL               End of Mother's Information  Mother: Asmita Mitchell #[id number]                Delivery - Provider to Complete ([id number])    Delivering clinician: Barbi Tejeda MD  Delivery Type (Choose the 1 that will go to the Birth History): , Low Transverse                          Priority: Urgent      Specifics: Repeat     Indications for : Planned repeat     Other personnel:  Provider Role   Hilda Knight, SHAAN Delivery Nurse   Filomena Aguilar RN Charge Nurse                    Placenta    Date/Time: 2025  5:12 PM  Removal: Spontaneous  Disposition: Hospital disposal             Anesthesia    Method: Spinal                    Presentation and Position    Presentation: Vertex    Position: Left Occiput Posterior                     Barbi Tejeda MD  "

## 2025-04-29 NOTE — TELEPHONE ENCOUNTER
Reason for call:  Patient reporting a symptom    Symptom or request: C-sec May 2nd,  Feeling contractions - baby pushing down.    Duration (how long have symptoms been present): today in the morning    Have you been treated for this before? No    Additional comments:     Phone Number patient can be reached at:  Home number on file [phone] (home)    Best Time:      Can we leave a detailed message on this number:  YES    Call taken on 4/29/2025 at 1:30 PM by Rena Galan

## 2025-04-29 NOTE — TELEPHONE ENCOUNTER
Pt with ctx and vaginal pressure    ; 38+4; LAst appt 25 with Dr Sesay   Repeat CS w/ BS planned for 25 with Dr Sesay    Pt reporting feeling contractions and pressure that started this morning  Pt reporting pressure and tightness every 5 minutes   Pt denies VB, LOF; pt reports baby is moving a lot    Recommended [facility] for [facility] as soon as possible  Report given to alireza Lozano RN in [facility]  Pt reports she has to wait for her  - I did recommend that she come for evaluation as soon as possible and to come without  if pain or pressure is increasing    Pt verbalizes understanding and agrees with plan    SHAAN Butt  [facility]  Ob/Gyn Clinic

## 2025-04-29 NOTE — H&P
[facility] Labor and Delivery H&P  2025  Asmita Frye  [id number]      HPI: Asmita Frye is a 35 year old  at 38w4d by 7w4d US who [facility]. She reports that she started kellie regularly and painfully this morning. She is disappointed to be in labor as she wanted her regular OB, Dr. Sesay to do her  section. She denies any LOF, but has had bloody show. Her baby is active.     Pregnancy notable for:  --Hx of c/s x3, planned repeat with bilateral salpingectomy  --GDMA-2; on insulin   --morbid obesity, BMI 41, pre-gravid BMI 37   --AMA: normal L2   --iron-deficiency anemia, on oral supplementation     OBHX:   OB History    Para Term  AB Living   4 3 3 0 0 3   SAB IAB Ectopic Multiple Live Births   0 0 0 0 3      # Outcome Date GA Lbr [place]/2nd Weight Sex Type Anes PTL Lv   4 Current            3 Term 23 37w4d  3.4 kg (7 lb 7.9 oz) M CS-LTranv  N ROYAL      Name: Horacio Chavarria      Apgar1: 9  Apgar5: 9   2 Term 16 39w0d  3.43 kg (7 lb 9 oz) M CS-Unspec   ROYAL      Birth Comments: CAN      Name: Mario Barrientos Term 12/29/10 40w0d  3.629 kg (8 lb) M CS-Unspec   ROYAL      Complications: Failure to Progress in Second Stage      Name: Roman       MedicalHX:   Past Medical History:   Diagnosis Date    Chickenpox        SurgicalHX:   Past Surgical History:   Procedure Laterality Date    APPENDECTOMY       SECTION N/A 2023    Procedure:  SECTION;  Surgeon: Melodie Sesay DO;  Location: [facility] OR    GYN SURGERY         Medications:   Current Facility-Administered Medications   Medication Dose Route Frequency Provider Last Rate Last Admin    acetaminophen (TYLENOL) tablet 975 mg  975 mg Oral Once Melodie Sesay, DO        carboprost (HEMABATE) injection 250 mcg  250 mcg Intramuscular Q15 Min PRN GomezabaSunil mendezMelodie, DO        And    loperamide (IMODIUM) capsule 4 mg  4 mg Oral Once PRN GomezabaSunil mendezMelodie, DO         ceFAZolin Sodium (ANCEF) injection 2 g  2 g Intravenous Pre-Op/Pre-procedure x 1 dose Barabash, Melodie, DO        ceFAZolin Sodium (ANCEF) injection 2 g  2 g Intravenous See Admin Instructions Barabash, Melodie, DO        lactated ringers BOLUS 500 mL  500 mL Intravenous Once Barabash, Melodie, DO        lactated ringers infusion   Intravenous Continuous Barabash, Melodie, DO        lidocaine (LMX4) kit   Topical Q1H PRN Barabash, Melodie, DO        lidocaine 1 % 0.1-1 mL  0.1-1 mL Other Q1H PRN Barabash, Melodie, DO        loperamide (IMODIUM) capsule 2 mg  2 mg Oral Q2H PRN Barabash, Melodie, DO        methylergonovine (METHERGINE) injection 200 mcg  200 mcg Intramuscular Q2H PRN Barabash, Melodie, DO        misoprostol (CYTOTEC) tablet 400 mcg  400 mcg Oral ONCE PRN REPEAT PER INSTRUCTIONS Barabash, Melodie, DO        Or    misoprostol (CYTOTEC) tablet 800 mcg  800 mcg Rectal ONCE PRN REPEAT PER INSTRUCTIONS Barabash, Melodie, DO        oxytocin (PITOCIN) 30 units in 500 mL 0.9% NaCl infusion  100-340 mL/hr Intravenous Continuous PRN Barabash, Melodie, DO        oxytocin (PITOCIN) 30 units in 500 mL 0.9% NaCl infusion  340 mL/hr Intravenous Continuous PRN Barabash, Melodie, DO        oxytocin (PITOCIN) injection 10 Units  10 Units Intramuscular Once PRN Barabash, Melodie, DO        oxytocin (PITOCIN) injection 10 Units  10 Units Intramuscular Once PRN Barabash, Melodie, DO        sodium chloride (PF) 0.9% PF flush 3 mL  3 mL Intracatheter Q8H Barabash, Melodie, DO        sodium chloride (PF) 0.9% PF flush 3 mL  3 mL Intracatheter q1 min prn Barabash, Melodie, DO        sodium citrate-citric acid (BICITRA) solution 30 mL  30 mL Oral Pre-Op/Pre-procedure x 1 dose Barabash, Melodie, DO        tranexamic acid 1 g in 100 mL NS IV bag (premix)  1 g Intravenous Q30 Min PRN Barabash, Melodie, DO           Allergies:  No Known Allergies    FamilyHX:    Family History   Problem Relation Age of Onset    No Known  Problems Mother     Heart Disease Father        SocialHX:   Social History     Socioeconomic History    Marital status:    Tobacco Use    Smoking status: Former     Current packs/day: 0.00     Types: Cigarettes     Quit date: 2023     Years since quittin.2    Smokeless tobacco: Never   Vaping Use    Vaping status: Never Used   Substance and Sexual Activity    Alcohol use: Not Currently    Drug use: Never    Sexual activity: Yes     Partners: Male     Social Drivers of Health      Received from [facility], [facility]    Financial Resource Strain   Food Insecurity: Not on File (2024)    Received from [facility]    Food Insecurity     Food: 0   Transportation Needs: Not on File (2019)    Received from [facility]    Transportation Needs     Transportation: 0   Physical Activity: Not on File (2019)    Received from [facility]    Physical Activity     Physical Activity: 0   Stress: Not on File (2019)    Received from [facility]    Stress     Stress: 0   Social Connections: Not on File (2024)    Received from [facility]    Social Connections     Connectedness: 0   Housing Stability: Not on File (2019)    Received from [facility]    Housing Stability     Housin       ROS: 10-point ROS negative except as in HPI     Physical Exam:  Vitals:    25 1505   BP: 129/72   Patient Position: Semi-Thompson's   Cuff Size: Adult Regular   Pulse: 80   Resp: 20   Temp: 98.5  F (36.9  C)   TempSrc: Oral     GEN: laboring and breathing through contractions   CV: Reg rate, warm and well-perfused   PULM: no increased work of breathing, no cough/wheeze   ABD: soft, gravid, non-tender, non-distended  EXT: trace edema, non-tender to palpation  CVX: -  Presentation: cephalic by leopold's  EFW: 8.5 lbs  Membranes: intact    NST:  FHT: baseline 140, mod variability, + accels, no decels  TOCO: q5 min     Labs:   Lab Results    Component Value Date    AS Negative 2024    HEPBANG Nonreactive 2024    HGB 11.0 (L) 2025       A/P: Asmita Frye is a 35 year old  at 38w4d by 7w4d US who [facility]. She has a history of c/s x3, planned repeat with bilateral salpingectomy for sterilization.   I reviewed the steps of the procedure as well as the risks of bleeding, infection and intraabdominal injury. She again confirms with me her desire to be sterilized and understands that this is permanent and cannot be reversed.   Written informed consent was signed and she is agreeable to a blood transfusion if indicated. I reviewed her federal tubal papers and noted them to have been signed on 3/19/2025. I reviewed them and again signed them today.   Admit to L&D. Place PIV. Draw labs: T&S, CBC, RPR.   FWB: Category 1 FHT.  Continue EFM and toco  GDMA-2: postpartum blood sugars per protocol  BRITTANY: CBC on admit   PNC: Rh POS, Rubella immune    Barbi Tejeda MD  OB/GYN

## 2025-04-30 ENCOUNTER — MYC MEDICAL ADVICE (OUTPATIENT)
Dept: EDUCATION SERVICES | Facility: CLINIC | Age: 35
End: 2025-04-30

## 2025-04-30 PROBLEM — D62 ANEMIA DUE TO BLOOD LOSS, ACUTE: Status: ACTIVE | Noted: 2025-04-30

## 2025-04-30 PROBLEM — E66.812 CLASS 2 OBESITY WITHOUT SERIOUS COMORBIDITY IN ADULT: Status: ACTIVE | Noted: 2023-07-20

## 2025-04-30 PROBLEM — Z98.891 S/P REPEAT LOW TRANSVERSE C-SECTION: Status: ACTIVE | Noted: 2023-12-21

## 2025-04-30 PROBLEM — Z98.890 POSTOPERATIVE STATE: Status: RESOLVED | Noted: 2025-04-29 | Resolved: 2025-04-30

## 2025-04-30 PROBLEM — Z34.80 PRENATAL CARE, SUBSEQUENT PREGNANCY: Status: RESOLVED | Noted: 2023-05-18 | Resolved: 2025-04-30

## 2025-04-30 PROBLEM — O09.523 MULTIGRAVIDA OF ADVANCED MATERNAL AGE IN THIRD TRIMESTER: Status: RESOLVED | Noted: 2023-08-14 | Resolved: 2025-04-30

## 2025-04-30 LAB
GLUCOSE BLDC GLUCOMTR-MCNC: 119 MG/DL (ref 70–99)
HGB BLD-MCNC: 10.8 G/DL (ref 11.7–15.7)
T PALLIDUM AB SER QL: NONREACTIVE

## 2025-04-30 PROCEDURE — 120N000001 HC R&B MED SURG/OB

## 2025-04-30 PROCEDURE — 250N000013 HC RX MED GY IP 250 OP 250 PS 637: Performed by: OBSTETRICS & GYNECOLOGY

## 2025-04-30 PROCEDURE — 85018 HEMOGLOBIN: CPT | Performed by: OBSTETRICS & GYNECOLOGY

## 2025-04-30 PROCEDURE — 36415 COLL VENOUS BLD VENIPUNCTURE: CPT | Performed by: OBSTETRICS & GYNECOLOGY

## 2025-04-30 PROCEDURE — 250N000011 HC RX IP 250 OP 636: Performed by: OBSTETRICS & GYNECOLOGY

## 2025-04-30 PROCEDURE — 250N000011 HC RX IP 250 OP 636: Mod: JZ | Performed by: OBSTETRICS & GYNECOLOGY

## 2025-04-30 RX ORDER — LIDOCAINE 4 G/G
1 PATCH TOPICAL
Status: DISCONTINUED | OUTPATIENT
Start: 2025-04-30 | End: 2025-05-02 | Stop reason: HOSPADM

## 2025-04-30 RX ORDER — ENOXAPARIN SODIUM 100 MG/ML
40 INJECTION SUBCUTANEOUS EVERY 12 HOURS
Status: DISCONTINUED | OUTPATIENT
Start: 2025-04-30 | End: 2025-05-02 | Stop reason: HOSPADM

## 2025-04-30 RX ORDER — PRENATAL VIT/IRON FUM/FOLIC AC 27MG-0.8MG
1 TABLET ORAL DAILY
Status: DISCONTINUED | OUTPATIENT
Start: 2025-05-01 | End: 2025-05-02 | Stop reason: HOSPADM

## 2025-04-30 RX ADMIN — MAGNESIUM HYDROXIDE 30 ML: 400 SUSPENSION ORAL at 22:14

## 2025-04-30 RX ADMIN — SENNOSIDES AND DOCUSATE SODIUM 1 TABLET: 50; 8.6 TABLET ORAL at 08:01

## 2025-04-30 RX ADMIN — OXYCODONE 5 MG: 5 TABLET ORAL at 16:25

## 2025-04-30 RX ADMIN — SENNOSIDES AND DOCUSATE SODIUM 1 TABLET: 50; 8.6 TABLET ORAL at 19:43

## 2025-04-30 RX ADMIN — KETOROLAC TROMETHAMINE 15 MG: 15 INJECTION, SOLUTION INTRAMUSCULAR; INTRAVENOUS at 12:08

## 2025-04-30 RX ADMIN — KETOROLAC TROMETHAMINE 15 MG: 15 INJECTION, SOLUTION INTRAMUSCULAR; INTRAVENOUS at 06:06

## 2025-04-30 RX ADMIN — SIMETHICONE 80 MG: 80 TABLET, CHEWABLE ORAL at 03:17

## 2025-04-30 RX ADMIN — ENOXAPARIN SODIUM 40 MG: 40 INJECTION SUBCUTANEOUS at 18:18

## 2025-04-30 RX ADMIN — SIMETHICONE 80 MG: 80 TABLET, CHEWABLE ORAL at 19:43

## 2025-04-30 RX ADMIN — ACETAMINOPHEN 975 MG: 325 TABLET, FILM COATED ORAL at 16:04

## 2025-04-30 RX ADMIN — OXYCODONE 5 MG: 5 TABLET ORAL at 12:34

## 2025-04-30 RX ADMIN — IBUPROFEN 800 MG: 800 TABLET ORAL at 18:18

## 2025-04-30 RX ADMIN — ACETAMINOPHEN 975 MG: 325 TABLET, FILM COATED ORAL at 00:07

## 2025-04-30 RX ADMIN — ACETAMINOPHEN 975 MG: 325 TABLET, FILM COATED ORAL at 04:13

## 2025-04-30 RX ADMIN — OXYCODONE 5 MG: 5 TABLET ORAL at 03:17

## 2025-04-30 RX ADMIN — KETOROLAC TROMETHAMINE 15 MG: 15 INJECTION, SOLUTION INTRAMUSCULAR; INTRAVENOUS at 00:08

## 2025-04-30 RX ADMIN — PANTOPRAZOLE SODIUM 40 MG: 40 TABLET, DELAYED RELEASE ORAL at 19:43

## 2025-04-30 RX ADMIN — PANTOPRAZOLE SODIUM 40 MG: 40 TABLET, DELAYED RELEASE ORAL at 08:01

## 2025-04-30 RX ADMIN — OXYCODONE 5 MG: 5 TABLET ORAL at 22:22

## 2025-04-30 RX ADMIN — ACETAMINOPHEN 975 MG: 325 TABLET, FILM COATED ORAL at 10:15

## 2025-04-30 RX ADMIN — LIDOCAINE 1 PATCH: 560 PATCH PERCUTANEOUS; TOPICAL; TRANSDERMAL at 21:31

## 2025-04-30 ASSESSMENT — ACTIVITIES OF DAILY LIVING (ADL)
ADLS_ACUITY_SCORE: 29
ADLS_ACUITY_SCORE: 33
ADLS_ACUITY_SCORE: 29
ADLS_ACUITY_SCORE: 33
ADLS_ACUITY_SCORE: 33
ADLS_ACUITY_SCORE: 29
ADLS_ACUITY_SCORE: 33
ADLS_ACUITY_SCORE: 29
ADLS_ACUITY_SCORE: 33
ADLS_ACUITY_SCORE: 33
ADLS_ACUITY_SCORE: 27
ADLS_ACUITY_SCORE: 33
ADLS_ACUITY_SCORE: 29
ADLS_ACUITY_SCORE: 33
ADLS_ACUITY_SCORE: 29
ADLS_ACUITY_SCORE: 33
ADLS_ACUITY_SCORE: 33

## 2025-04-30 NOTE — ASSESSMENT & PLAN NOTE
Elevated fasting glucose today, will continue to monitor but plan for discontinuation of insulin post partum  Will need 6 week follow up for diabetes screening

## 2025-04-30 NOTE — PLAN OF CARE
Goal Outcome Evaluation:    Assessments as charted. B/P: 121/65, T: 98.5, P: 69, R: 20. Rates pain: 2/10 declined medications at this time, had Toradol and Tylenol after procedure, see MAR. Incision: Healing well, well approximated, without signs of infection, and no drainage. Voiding without difficulty. Fundus Firm, midline U/U. Lochia: Light. Activity: Movement is triggering nausea, pt is independently shifting weight side to side. Infant feeding: Breast feeding going well and pt is independently positioning and latching baby.           Postpartum breastfeeding assessment completed and education provided, see Patient Education Activity.  Items included in the education are:   proper positioning and latch  effectiveness of feeding  manual expression  handling and storing breastmilk  maintenance of breastfeeding for the first 6 months  sign/symptoms of infant feeding issues requiring referral to qualified health care provider  Postpartum care education provided, see Patient Education activity. Patient denies needs at this time.

## 2025-04-30 NOTE — PROGRESS NOTES
"S:Delivery  B:Spontaneous Labor,38w4d    No results found for: \"GBS\" with antibiotic treatment not indicated 4 hours prior to delivery.  A: Patient delivered Repeat C/S at 1710 with Dr. JOANNA Tejeda in attendance and baby placed on mother's abdomen for delayed cord clamping. Baby received from surgeon. Baby to warmer for drying and wrapped in blanket.. Placed skin to skin @ 1801.. Apgars 9/9.Delivery .  IV infusion of Oxytocin  infused. Placenta removal manual. MD does not want placenta sent to pathology.  See Flowsheet for VS and PP checks.  .  Labor care plan goals met, transition now to postpartum care. Postpartum QBL pending  R: Expect routine postpartum care. Anticipate first feeding within the hour or whenever infant displays feeding cues. Continue skin to skin. Prior discussion with mother indicates that feeding plan is Breast feeding . Educated mother on importance of exclusive breastfeeding, expected feeding readiness cues and encouraged her to observe for these cues while rooming in. Informed her that breastfeeding assistance would be provided.    "

## 2025-04-30 NOTE — PROGRESS NOTES
[facility] OB/GYN Department    Post-Partum Progress Note: POD #1    Name: Asmita Frye  Date: 4/30/2025    Subjective:   Patient seen and examined.  No complaints today.  Pain well controlled.  Tolerating regular diet, without nausea or vomiting.  Ambulating without difficulty.  Barcenas removed post operatively, voiding spontaneously. Breast feeding.     ROS:    General/Constitutional:  Denies chills or fever  Respiratory: Denies shortness of breath  Cardiovascular: Denies chest pain  Gastrointestinal:  +mild uterine cramping, no nausea or vomiting  Genitourinary: Denies difficulty urinating  Musculoskeletal: Denies peripheral edema      Objective:     Intake/Output Summary (Last 24 hours) at 4/30/2025 1347  Last data filed at 4/30/2025 1220  Gross per 24 hour   Intake 1550 ml   Output 2543 ml   Net -993 ml       Patient Vitals for the past 24 hrs:   BP Temp Temp src Pulse Resp SpO2   04/30/25 1220 119/57 98.1  F (36.7  C) Oral 76 16 97 %   04/30/25 0800 107/50 98.4  F (36.9  C) Oral 77 16 96 %   04/30/25 0416 109/65 98.2  F (36.8  C) Oral 76 16 --   04/29/25 2354 108/54 98.1  F (36.7  C) Oral 63 16 95 %   04/29/25 2000 126/62 -- -- -- -- --   04/29/25 1946 121/65 -- -- 69 -- --   04/29/25 1945 121/65 -- -- 69 -- --   04/29/25 1934 129/64 -- -- 73 -- --   04/29/25 1919 119/67 -- -- 58 -- --   04/29/25 1907 123/59 -- -- 57 -- --   04/29/25 1903 123/59 -- -- 57 20 --   04/29/25 1849 (!) 143/57 -- -- 78 20 --   04/29/25 1845 (!) 143/57 -- -- -- -- --   04/29/25 1836 139/54 -- -- 64 20 --   04/29/25 1830 139/54 -- -- -- -- --   04/29/25 1814 127/46 -- -- 67 20 --   04/29/25 1505 129/72 98.5  F (36.9  C) Oral 80 20 --       Recent Labs   Lab 04/30/25  0719 04/29/25  1534   HGB 10.8* 12.0         General appearance: well-hydrated, A&O x 3, no apparent distress  ENT: EOMI, sclera anicteric   Lungs: Equal expansion bilaterally, no accessory muscle use  Heart: No heaves or thrills. No peripheral  varicosities  Constitutional: See vitals  Abdomen: Soft, non-distended, no rebound or rigidity. Incision c/d/i, uterus firm below umbilicus with non-tender fundus   Neurologic: CN II-XII grossly intact, no lateralizing defects, no gross movement abnormalities  Extremities: 1+ pitting edema in feet, no calf tenderness      Assessment and Plan:    Insulin controlled gestational diabetes mellitus (GDM) in third trimester  Elevated fasting glucose today, will continue to monitor but plan for discontinuation of insulin post partum  Will need 6 week follow up for diabetes screening    S/P repeat low transverse   POD#1 s/p RLTCS with bilateral salpingectomy  Routine post operative care  Encourage ambulation  Optimize pain management  Lactation support  Anticipate discharge home in next 1-2 days      Anemia due to blood loss, acute  Mild anemia post operatively  Continue iron replacement with Vitamins C and B12 for 2-3 weeks post partum  Continue prenatal vitamin with iron while breastfeeding    Class 2 obesity without serious comorbidity in adult  Lovenox and SCDs post partum for VTE prevention    Melodie Sesay DO

## 2025-04-30 NOTE — PLAN OF CARE
Goal Outcome Evaluation:      Plan of Care Reviewed With: patient    Overall Patient Progress: improvingOverall Patient Progress: improving    Outcome Evaluation: Patient progressing towards goals.  PP C/S checks and VS WDL.  Fasting BG this am was 116 and provider aware per orders.  Consistently rates uterine incision pain 5-6/10 and has been getting PO Tylenol and IV Toradol scheduled with a PRN oxycodone given x1 this shift so far.  Up to shower, ambulated in the halls x1, and up in chair x1.  Tolerating PO well and has been voiding spontaneously now without issues.  Plan is to continue to monitor and gain independence with self cares overnight with possible discharge home tomorrow.

## 2025-04-30 NOTE — ASSESSMENT & PLAN NOTE
Mild anemia post operatively  Continue iron replacement with Vitamins C and B12 for 2-3 weeks post partum  Continue prenatal vitamin with iron while breastfeeding

## 2025-04-30 NOTE — ASSESSMENT & PLAN NOTE
POD#1 s/p RLTCS with bilateral salpingectomy  Routine post operative care  Encourage ambulation  Optimize pain management  Lactation support  Anticipate discharge home in next 1-2 days

## 2025-04-30 NOTE — PROGRESS NOTES
Plan for patient to have unscheduled repeat  section. IV started without difficulty and fluids infusing. History and plan of care reviewed.SCD's applied bilaterally.Solis cloth applied to abdomen, nozin done and Bicitra given .Dr SWEETIE Tejeda  has been here to see patient. Jules Minor CRNA has been here to see patient.

## 2025-04-30 NOTE — PLAN OF CARE
Goal Outcome Evaluation:         PPD#0    Patient alert and up with assist.   Postpartum checks are within normal limits.   Incision healing well.   Vital signs stable.  Pain managed with scheduled Tylenol and Toradol. Received PRN Oxycodone x 1 this shift.     Tolerating regular diet and denied any nausea.   Voiding spontaneously without issues.     Patient performing self cares and is able to care for infant.   Positive attachment behaviors are observed with infant.   Support persons are not present.      Patient education done about breastfeeding, formula feeding,  cares, postpartum cares, pain management/plan, fall risk,  safety, and rest.    Anticipate discharge on 25.

## 2025-05-01 PROBLEM — R03.0 ELEVATED BLOOD PRESSURE READING: Status: ACTIVE | Noted: 2025-05-01

## 2025-05-01 PROCEDURE — 250N000013 HC RX MED GY IP 250 OP 250 PS 637: Performed by: OBSTETRICS & GYNECOLOGY

## 2025-05-01 PROCEDURE — 120N000001 HC R&B MED SURG/OB

## 2025-05-01 PROCEDURE — 250N000011 HC RX IP 250 OP 636: Performed by: OBSTETRICS & GYNECOLOGY

## 2025-05-01 RX ORDER — POLYETHYLENE GLYCOL 3350 17 G/17G
1 POWDER, FOR SOLUTION ORAL DAILY
Qty: 527 G | Refills: 0 | Status: SHIPPED | OUTPATIENT
Start: 2025-05-01

## 2025-05-01 RX ORDER — IBUPROFEN 600 MG/1
600 TABLET, FILM COATED ORAL EVERY 6 HOURS PRN
Qty: 60 TABLET | Refills: 0 | Status: SHIPPED | OUTPATIENT
Start: 2025-05-01

## 2025-05-01 RX ORDER — OXYCODONE HYDROCHLORIDE 5 MG/1
5 TABLET ORAL EVERY 6 HOURS PRN
Qty: 15 TABLET | Refills: 0 | Status: SHIPPED | OUTPATIENT
Start: 2025-05-01

## 2025-05-01 RX ORDER — ACETAMINOPHEN 325 MG/1
650 TABLET ORAL EVERY 6 HOURS PRN
Qty: 100 TABLET | Refills: 0 | Status: SHIPPED | OUTPATIENT
Start: 2025-05-01

## 2025-05-01 RX ADMIN — IBUPROFEN 800 MG: 800 TABLET ORAL at 00:12

## 2025-05-01 RX ADMIN — OXYCODONE 5 MG: 5 TABLET ORAL at 03:39

## 2025-05-01 RX ADMIN — ACETAMINOPHEN 975 MG: 325 TABLET, FILM COATED ORAL at 18:34

## 2025-05-01 RX ADMIN — SENNOSIDES AND DOCUSATE SODIUM 2 TABLET: 50; 8.6 TABLET ORAL at 20:05

## 2025-05-01 RX ADMIN — IBUPROFEN 800 MG: 800 TABLET ORAL at 18:34

## 2025-05-01 RX ADMIN — IBUPROFEN 800 MG: 800 TABLET ORAL at 11:57

## 2025-05-01 RX ADMIN — PRENATAL VITAMINS-IRON FUMARATE 27 MG IRON-FOLIC ACID 0.8 MG TABLET 1 TABLET: at 07:57

## 2025-05-01 RX ADMIN — PANTOPRAZOLE SODIUM 40 MG: 40 TABLET, DELAYED RELEASE ORAL at 20:05

## 2025-05-01 RX ADMIN — SENNOSIDES AND DOCUSATE SODIUM 2 TABLET: 50; 8.6 TABLET ORAL at 07:57

## 2025-05-01 RX ADMIN — OXYCODONE 5 MG: 5 TABLET ORAL at 08:13

## 2025-05-01 RX ADMIN — ENOXAPARIN SODIUM 40 MG: 40 INJECTION SUBCUTANEOUS at 06:06

## 2025-05-01 RX ADMIN — ACETAMINOPHEN 975 MG: 325 TABLET, FILM COATED ORAL at 00:11

## 2025-05-01 RX ADMIN — ACETAMINOPHEN 975 MG: 325 TABLET, FILM COATED ORAL at 11:57

## 2025-05-01 RX ADMIN — LIDOCAINE 1 PATCH: 560 PATCH PERCUTANEOUS; TOPICAL; TRANSDERMAL at 20:05

## 2025-05-01 RX ADMIN — IBUPROFEN 800 MG: 800 TABLET ORAL at 06:05

## 2025-05-01 RX ADMIN — OXYCODONE 5 MG: 5 TABLET ORAL at 14:45

## 2025-05-01 RX ADMIN — ACETAMINOPHEN 975 MG: 325 TABLET, FILM COATED ORAL at 06:05

## 2025-05-01 RX ADMIN — SIMETHICONE 80 MG: 80 TABLET, CHEWABLE ORAL at 14:45

## 2025-05-01 RX ADMIN — ENOXAPARIN SODIUM 40 MG: 40 INJECTION SUBCUTANEOUS at 18:34

## 2025-05-01 RX ADMIN — PANTOPRAZOLE SODIUM 40 MG: 40 TABLET, DELAYED RELEASE ORAL at 07:57

## 2025-05-01 ASSESSMENT — ACTIVITIES OF DAILY LIVING (ADL)
ADLS_ACUITY_SCORE: 32

## 2025-05-01 NOTE — PLAN OF CARE
Goal Outcome Evaluation:      Plan of Care Reviewed With: patient    Overall Patient Progress: improvingOverall Patient Progress: improving    Outcome Evaluation: Patient meeting all goals except for pain control.  Pain control is manageable, however, patient continues to rate pain 5-7/10 despite PO scheduled Tylenol & ibuprofen around the clock and oxycodone fairly regularly for added pain control.  Reports RLQ burning and incisional pain.  Otherwise is independent with self and infant cares.  Plan is infant to have circumcision later this afternoon and patient wanting to stay another night- discharge tomorrow am.

## 2025-05-01 NOTE — PLAN OF CARE
Data: Vital signs within normal limits. Postpartum checks within normal limits - see flow record. Patient eating and drinking normally. Patient able to empty bladder independently and is up ambulating. No apparent signs of infection. Incision healing well. Patient performing self cares and is able to care for infant.  Action: Patient medicated during the shift for pain. See MAR. Patient reassessed within 1 hour after each medication and pain was improved - patient stated she was comfortable. Patient education done about breastfeeding, post partum care. See flow record.  Response: Positive attachment behaviors observed with infant. Support persons not present most of shift.   Plan: Anticipate discharge on 5/1/25.

## 2025-05-01 NOTE — DISCHARGE SUMMARY
"Labor and Delivery Discharge Summary    Asmita Frye MRN# [id number]   Age: 35 year old YOB: 1990     Date of Admission:  2025  Date of Discharge::  2025  Admitting Physician:  Barbi Tejeda MD  Discharge Physician:  Cony Donnelly MD            Admission Diagnoses:   - at 38w4d  -Hx of c/s x3  -Spontaneous labor  -Desire for permanent sterilization  -GDMA2: on NPH 26 units HS, Novolog 3 units before each meal  -Morbid obesity, BMI 41, pre-gravid BMI 37   -AMA  -Iron-deficiency anemia, on oral supplementation           Discharge Diagnosis:   Same as above except below    delivered at 38w4d by  section  S/p bilateral salpingectomy  Elevated BPs without diagnosis of gHTN         Procedures:   Repeat  section via pfannenstiel skin incision with double layer uterine closure, bilateral salpingectomy  Spinal anesthesia         Medications Prior to Admission:     Medications Prior to Admission   Medication Sig Dispense Refill Last Dose/Taking    cyanocobalamin (VITAMIN B-12) 1000 MCG tablet Take 1 tablet (1,000 mcg) by mouth daily. 90 tablet 2 2025    ferrous sulfate (FEROSUL) 325 (65 Fe) MG tablet Take 1 tablet (325 mg) by mouth daily (with breakfast). 90 tablet 2 2025    omeprazole (PRILOSEC) 40 MG DR capsule Take 1 capsule (40 mg) by mouth daily. 90 capsule 3 2025    Prenatal Vit-Fe Fumarate-FA (PRENATAL MULTIVITAMIN W/IRON) 27-0.8 MG tablet Take 1 tablet by mouth daily   2025    vitamin C (ASCORBIC ACID) 250 MG tablet Take 1 tablet (250 mg) by mouth daily. 90 tablet 2 2025    acetone urine (KETOSTIX) test strip Test once daily until negative for a week, then test once weekly. 50 strip 0     blood glucose (NO BRAND SPECIFIED) test strip Use to test blood sugar 4 times daily or as directed. 150 strip 5     Insulin Pen Needle (PEN NEEDLES) 32G X 4 MM MISC 1 each daily. 100 each 0     insulin syringe-needle U-100 (31G X 5/16\" " "0.3 ML) 31G X \" 0.3 ML miscellaneous Use 1 syringe daily or as directed. 100 each 1     OneTouch Delica Lancets 33G MISC 1 each 4 times daily. 150 each 4     [DISCONTINUED] insulin aspart (NOVOLOG FLEXPEN) 100 UNIT/ML pen Inject 3 Units subcutaneously daily (before supper). ON HOLD 25       [DISCONTINUED] insulin  UNIT/ML vial Inject 26 Units subcutaneously daily. Increase as instructed. Max dose: 42 units daily. 10 mL 1 2025          Discharge Medications:   Ibuprofen, tylenol, oxycodone (5mg q6hr PRN, 15 tablets), Miralax     Review of your medicines        START taking        Dose / Directions   acetaminophen 325 MG tablet  Commonly known as: TYLENOL  Used for: S/P repeat low transverse       Dose: 650 mg  Take 2 tablets (650 mg) by mouth every 6 hours as needed for mild pain. Start after Delivery.  Quantity: 100 tablet  Refills: 0     ibuprofen 600 MG tablet  Commonly known as: ADVIL/MOTRIN  Used for: S/P repeat low transverse       Dose: 600 mg  Take 1 tablet (600 mg) by mouth every 6 hours as needed for moderate pain. Start after delivery  Quantity: 60 tablet  Refills: 0     oxyCODONE 5 MG tablet  Commonly known as: ROXICODONE  Used for: S/P repeat low transverse       Dose: 5 mg  Take 1 tablet (5 mg) by mouth every 6 hours as needed for moderate to severe pain.  Quantity: 15 tablet  Refills: 0     polyethylene glycol 17 GM/Dose powder  Commonly known as: MIRALAX  Used for: S/P repeat low transverse       Dose: 1 Capful  Take 17 g (1 Capful) by mouth daily.  Quantity: 527 g  Refills: 0            CONTINUE these medicines which have NOT CHANGED        Dose / Directions   acetone urine test strip  Commonly known as: KETOSTIX  Used for: Diet controlled gestational diabetes mellitus (GDM) in third trimester      Test once daily until negative for a week, then test once weekly.  Quantity: 50 strip  Refills: 0     blood glucose test strip  Commonly known as: " "NO BRAND SPECIFIED  Used for: Diet controlled gestational diabetes mellitus (GDM) in third trimester      Use to test blood sugar 4 times daily or as directed.  Quantity: 150 strip  Refills: 5     cyanocobalamin 1000 MCG tablet  Commonly known as: VITAMIN B-12  Used for: Iron deficiency anemia during pregnancy      Dose: 1,000 mcg  Take 1 tablet (1,000 mcg) by mouth daily.  Quantity: 90 tablet  Refills: 2     ferrous sulfate 325 (65 Fe) MG tablet  Commonly known as: FEROSUL  Used for: Iron deficiency anemia during pregnancy      Dose: 325 mg  Take 1 tablet (325 mg) by mouth daily (with breakfast).  Quantity: 90 tablet  Refills: 2     insulin syringe-needle U-100 31G X 5/16\" 0.3 ML miscellaneous  Commonly known as: 31G X 5/16\" 0.3 ML  Used for: Gestational diabetes mellitus (GDM) requiring insulin      Use 1 syringe daily or as directed.  Quantity: 100 each  Refills: 1     omeprazole 40 MG DR capsule  Commonly known as: PriLOSEC  Used for: Gastroesophageal reflux disease with esophagitis without hemorrhage      Dose: 40 mg  Take 1 capsule (40 mg) by mouth daily.  Quantity: 90 capsule  Refills: 3     OneTouch Delica Lancets 33G Misc  Used for: Diet controlled gestational diabetes mellitus (GDM) in third trimester      Dose: 1 each  1 each 4 times daily.  Quantity: 150 each  Refills: 4     Pen Needles 32G X 4 MM Misc  Used for: Insulin controlled gestational diabetes mellitus (GDM) in third trimester      Dose: 1 each  1 each daily.  Quantity: 100 each  Refills: 0     prenatal multivitamin w/iron 27-0.8 MG tablet      Dose: 1 tablet  Take 1 tablet by mouth daily  Refills: 0     vitamin C 250 MG tablet  Commonly known as: ASCORBIC ACID  Used for: Iron deficiency anemia during pregnancy      Dose: 250 mg  Take 1 tablet (250 mg) by mouth daily.  Quantity: 90 tablet  Refills: 2            STOP taking      insulin  UNIT/ML vial        NovoLOG FLEXPEN 100 UNIT/ML soln  Generic drug: insulin aspart                "   Where to get your medicines        These medications were sent to [facility], [place] - [address]  [address] [place] [address], [place] [address]      Phone: [phone]   acetaminophen 325 MG tablet  ibuprofen 600 MG tablet  oxyCODONE 5 MG tablet  polyethylene glycol 17 GM/Dose powder             Consultations:   none          Brief Admission History:   Asmita Frye is a 35 year old  at 38w4d by 7w4d US who [facility]. She has a history of c/s x3, planned repeat with bilateral salpingectomy for sterilization.        Intraoperative course   The procedure was noted for moderately dense abdominal wall adhesions and moderately dense intraabdominal adhesions of the bladder, omentum and uterus/lower uterine segment.  mL.  See operative report for details.        Postpartum Course   The patient's hospital course was unremarkable.  She recovered as anticipated and experienced no post-operative complications. On discharge, her pain was well controlled. Vaginal bleeding is similar to peak menstrual flow.  Voiding without difficulty.  Ambulating well and tolerating a normal diet.  No fever or significant wound drainage. Infant is stable.  No bowel movement yet.  She was discharged on post-partum day #2.    Post-partum hemoglobin:   Hemoglobin   Date Value Ref Range Status   2025 10.8 (L) 11.7 - 15.7 g/dL Final          Discharge Instructions and Follow-Up:     Discharge diet: Regular   Discharge activity: No lifting greater than 20 lbs, pushing, pulling, or other strenuous activity for 6 weeks. Pelvic rest for 6 weeks including no sexual intercourse, tampons, or douching. No driving until you can slam on the breaks without pain or while on narcotic pain medications.    Discharge follow-up: Follow up with primary OB for routine postpartum visit in 6 weeks with 2hr GTT and within 1 week for BP check   Wound care: Keep incision clean and dry            Discharge Disposition:     Discharged to home in good condition     Cony Donnelly MD  Obstetrics and Gynecology  05/01/2025

## 2025-05-01 NOTE — PROGRESS NOTES
OB/GYN Postpartum Note  Service Date: 2025    S: Pain well controlled with current pain meds. Lochia = heavy period.  Eating and drinking without nausea/vomiting.  Passing flatus but not BM. Ambulating without lightheadedness or dizziness.  Voiding without difficulty.     O:  Patient Vitals for the past 12 hrs:   BP Temp Temp src Pulse Resp SpO2   25 0800 117/55 98  F (36.7  C) Oral 77 16 96 %   25 0012 121/59 98.3  F (36.8  C) Oral -- 16 --     Gen:  NAD  CV: Well perfused  Resp: Normal respiratory efforts  Abd: soft, nondistended, nontender, fundus firm at 2cm below umbilicus  Incision: pfannenstiel skin incision is clean, dry, intact   Ext: non-tender, trace edema, no erythema    Hemoglobin   Date Value Ref Range Status   2025 10.8 (L) 11.7 - 15.7 g/dL Final   2025 12.0 11.7 - 15.7 g/dL Final     A/P: Asmita Frye, 35 year old  POD#2 s/p RLTCS + BS.    Routine postpartum care: meeting all postop goals.    GDMA2: 6wk postpartum 2hr glucose tolerance test to screen for diabetes     BMI 40.89: on lovenox 40mg q12hr +  for DVT ppx while hospitalized.    Elevated BPs without diagnosis of gHTN: discussed preeclampsia symptoms to be watching out for.  Will discharge patient home with a blood pressure cuff and plan to return in clinic in 1 week for BP check    Asymptomatic anemia: Plan to continue prenatal vitamin with iron supplements upon discharge    Disposition: discharge home today    Cony Donnelly MD  Obstetrics and Gynecology  2025

## 2025-05-01 NOTE — PROGRESS NOTES
Pt reporting shoulder pain and burning sensation on right side of  incision. Discussed shoulder pain can often be cause by gas. Discussed that the  burning sensation is normal but sometimes applying a lidocaine patch near the  incision can be helpful. Pt received simethicone and a lidocaine patch was place-see MAR. Pt reported that both of these interventions were helpful.

## 2025-05-02 ENCOUNTER — ANESTHESIA (OUTPATIENT)
Dept: SURGERY | Facility: CLINIC | Age: 35
End: 2025-05-02
Payer: COMMERCIAL

## 2025-05-02 VITALS
RESPIRATION RATE: 16 BRPM | TEMPERATURE: 98.2 F | SYSTOLIC BLOOD PRESSURE: 118 MMHG | HEART RATE: 80 BPM | OXYGEN SATURATION: 96 % | WEIGHT: 212.3 LBS | DIASTOLIC BLOOD PRESSURE: 53 MMHG | BODY MASS INDEX: 40.11 KG/M2

## 2025-05-02 VITALS
OXYGEN SATURATION: 96 % | HEART RATE: 72 BPM | RESPIRATION RATE: 16 BRPM | TEMPERATURE: 98 F | DIASTOLIC BLOOD PRESSURE: 54 MMHG | BODY MASS INDEX: 40.11 KG/M2 | WEIGHT: 212.3 LBS | SYSTOLIC BLOOD PRESSURE: 115 MMHG

## 2025-05-02 LAB
PATH REPORT.COMMENTS IMP SPEC: NORMAL
PATH REPORT.COMMENTS IMP SPEC: NORMAL
PATH REPORT.FINAL DX SPEC: NORMAL
PATH REPORT.GROSS SPEC: NORMAL
PATH REPORT.MICROSCOPIC SPEC OTHER STN: NORMAL
PATH REPORT.RELEVANT HX SPEC: NORMAL
PHOTO IMAGE: NORMAL

## 2025-05-02 PROCEDURE — 250N000013 HC RX MED GY IP 250 OP 250 PS 637: Performed by: OBSTETRICS & GYNECOLOGY

## 2025-05-02 PROCEDURE — 250N000011 HC RX IP 250 OP 636: Performed by: OBSTETRICS & GYNECOLOGY

## 2025-05-02 RX ORDER — LIDOCAINE 4 G/G
1 PATCH TOPICAL EVERY 24 HOURS
Qty: 7 PATCH | Refills: 1 | Status: SHIPPED | OUTPATIENT
Start: 2025-05-02

## 2025-05-02 RX ADMIN — PRENATAL VITAMINS-IRON FUMARATE 27 MG IRON-FOLIC ACID 0.8 MG TABLET 1 TABLET: at 09:46

## 2025-05-02 RX ADMIN — IBUPROFEN 800 MG: 800 TABLET ORAL at 00:22

## 2025-05-02 RX ADMIN — ACETAMINOPHEN 975 MG: 325 TABLET, FILM COATED ORAL at 00:22

## 2025-05-02 RX ADMIN — ACETAMINOPHEN 975 MG: 325 TABLET, FILM COATED ORAL at 05:34

## 2025-05-02 RX ADMIN — IBUPROFEN 800 MG: 800 TABLET ORAL at 11:38

## 2025-05-02 RX ADMIN — IBUPROFEN 800 MG: 800 TABLET ORAL at 05:35

## 2025-05-02 RX ADMIN — PANTOPRAZOLE SODIUM 40 MG: 40 TABLET, DELAYED RELEASE ORAL at 09:47

## 2025-05-02 RX ADMIN — ACETAMINOPHEN 975 MG: 325 TABLET, FILM COATED ORAL at 11:38

## 2025-05-02 RX ADMIN — ENOXAPARIN SODIUM 40 MG: 40 INJECTION SUBCUTANEOUS at 05:35

## 2025-05-02 RX ADMIN — SENNOSIDES AND DOCUSATE SODIUM 1 TABLET: 50; 8.6 TABLET ORAL at 09:46

## 2025-05-02 RX ADMIN — OXYCODONE 5 MG: 5 TABLET ORAL at 09:46

## 2025-05-02 ASSESSMENT — ACTIVITIES OF DAILY LIVING (ADL)
ADLS_ACUITY_SCORE: 32

## 2025-05-02 NOTE — DISCHARGE SUMMARY
"[facility] Discharge Summary    Asmita Frye MRN# [id number]   Age: 35 year old YOB: 1990     Date of Admission:  2025  Date of Discharge::  2025  Admitting Physician:  Barbi Tejeda MD  Discharge Physician:  Melodie Sesay DO     Home clinic: [facility]          Admission Diagnoses:   Pregnancy with history of  section, antepartum [O34.219]  Encounter for sterilization [Z30.2]  Postoperative state [Z98.890]          Discharge Diagnosis:     S/p repeat low transverse C section  Anemia, post partum  Morbid obesity            Procedures:     Procedure(s): Repeat low transverse  section  Bilateral salpingectomy       No other procedures performed during this admission           Medications Prior to Admission:     Medications Prior to Admission   Medication Sig Dispense Refill Last Dose/Taking    cyanocobalamin (VITAMIN B-12) 1000 MCG tablet Take 1 tablet (1,000 mcg) by mouth daily. 90 tablet 2 2025    ferrous sulfate (FEROSUL) 325 (65 Fe) MG tablet Take 1 tablet (325 mg) by mouth daily (with breakfast). 90 tablet 2 2025    omeprazole (PRILOSEC) 40 MG DR capsule Take 1 capsule (40 mg) by mouth daily. 90 capsule 3 2025    Prenatal Vit-Fe Fumarate-FA (PRENATAL MULTIVITAMIN W/IRON) 27-0.8 MG tablet Take 1 tablet by mouth daily   2025    vitamin C (ASCORBIC ACID) 250 MG tablet Take 1 tablet (250 mg) by mouth daily. 90 tablet 2 2025    acetone urine (KETOSTIX) test strip Test once daily until negative for a week, then test once weekly. 50 strip 0     blood glucose (NO BRAND SPECIFIED) test strip Use to test blood sugar 4 times daily or as directed. 150 strip 5     Insulin Pen Needle (PEN NEEDLES) 32G X 4 MM MISC 1 each daily. 100 each 0     insulin syringe-needle U-100 (31G X 5/16\" 0.3 ML) 31G X 5/16\" 0.3 ML miscellaneous Use 1 syringe daily or as directed. 100 each 1     OneTouch Delica Lancets 33G " MISC 1 each 4 times daily. 150 each 4     [DISCONTINUED] insulin aspart (NOVOLOG FLEXPEN) 100 UNIT/ML pen Inject 3 Units subcutaneously daily (before supper). ON HOLD 25       [DISCONTINUED] insulin  UNIT/ML vial Inject 26 Units subcutaneously daily. Increase as instructed. Max dose: 42 units daily. 10 mL 1 2025             Discharge Medications:     Current Discharge Medication List        START taking these medications    Details   acetaminophen (TYLENOL) 325 MG tablet Take 2 tablets (650 mg) by mouth every 6 hours as needed for mild pain. Start after Delivery.  Qty: 100 tablet, Refills: 0    Associated Diagnoses: S/P repeat low transverse       ibuprofen (ADVIL/MOTRIN) 600 MG tablet Take 1 tablet (600 mg) by mouth every 6 hours as needed for moderate pain. Start after delivery  Qty: 60 tablet, Refills: 0    Associated Diagnoses: S/P repeat low transverse       Lidocaine (LIDOCARE) 4 % Patch Place 1 patch over 12 hours onto the skin every 24 hours. To prevent lidocaine toxicity, patient should be patch free for 12 hrs daily.  Qty: 7 patch, Refills: 1    Associated Diagnoses: S/P repeat low transverse       oxyCODONE (ROXICODONE) 5 MG tablet Take 1 tablet (5 mg) by mouth every 6 hours as needed for moderate to severe pain.  Qty: 15 tablet, Refills: 0    Associated Diagnoses: S/P repeat low transverse       polyethylene glycol (MIRALAX) 17 GM/Dose powder Take 17 g (1 Capful) by mouth daily.  Qty: 527 g, Refills: 0    Associated Diagnoses: S/P repeat low transverse            CONTINUE these medications which have NOT CHANGED    Details   cyanocobalamin (VITAMIN B-12) 1000 MCG tablet Take 1 tablet (1,000 mcg) by mouth daily.  Qty: 90 tablet, Refills: 2    Associated Diagnoses: Iron deficiency anemia during pregnancy      ferrous sulfate (FEROSUL) 325 (65 Fe) MG tablet Take 1 tablet (325 mg) by mouth daily (with breakfast).  Qty: 90 tablet, Refills: 2     "Associated Diagnoses: Iron deficiency anemia during pregnancy      omeprazole (PRILOSEC) 40 MG DR capsule Take 1 capsule (40 mg) by mouth daily.  Qty: 90 capsule, Refills: 3    Associated Diagnoses: Gastroesophageal reflux disease with esophagitis without hemorrhage      Prenatal Vit-Fe Fumarate-FA (PRENATAL MULTIVITAMIN W/IRON) 27-0.8 MG tablet Take 1 tablet by mouth daily      vitamin C (ASCORBIC ACID) 250 MG tablet Take 1 tablet (250 mg) by mouth daily.  Qty: 90 tablet, Refills: 2    Associated Diagnoses: Iron deficiency anemia during pregnancy      acetone urine (KETOSTIX) test strip Test once daily until negative for a week, then test once weekly.  Qty: 50 strip, Refills: 0    Associated Diagnoses: Diet controlled gestational diabetes mellitus (GDM) in third trimester      blood glucose (NO BRAND SPECIFIED) test strip Use to test blood sugar 4 times daily or as directed.  Qty: 150 strip, Refills: 5    Associated Diagnoses: Diet controlled gestational diabetes mellitus (GDM) in third trimester      Insulin Pen Needle (PEN NEEDLES) 32G X 4 MM MISC 1 each daily.  Qty: 100 each, Refills: 0    Associated Diagnoses: Insulin controlled gestational diabetes mellitus (GDM) in third trimester      insulin syringe-needle U-100 (31G X 5/16\" 0.3 ML) 31G X 5/16\" 0.3 ML miscellaneous Use 1 syringe daily or as directed.  Qty: 100 each, Refills: 1    Associated Diagnoses: Gestational diabetes mellitus (GDM) requiring insulin      OneTouch Delica Lancets 33G MISC 1 each 4 times daily.  Qty: 150 each, Refills: 4    Associated Diagnoses: Diet controlled gestational diabetes mellitus (GDM) in third trimester           STOP taking these medications       insulin aspart (NOVOLOG FLEXPEN) 100 UNIT/ML pen Comments:   Reason for Stopping:         insulin  UNIT/ML vial Comments:   Reason for Stopping:                     Consultations:   No consultations were requested during this admission          Brief History of Labor or " "Admission:   Asmita Frye is a 35 year old  at 38w4d by 7w4d US who [facility]. She has a history of c/s x3, planned repeat with bilateral salpingectomy for sterilization.   I reviewed the steps of the procedure as well as the risks of bleeding, infection and intraabdominal injury. She again confirms with me her desire to be sterilized and understands that this is permanent and cannot be reversed.   Written informed consent was signed and she is agreeable to a blood transfusion if indicated. I reviewed her federal tubal papers and noted them to have been signed on 3/19/2025. I reviewed them and again signed them today.  Uncomplicated repeat  section with bilateral salpingectomy.   QBL 147cc.   \"Aryan\"           Hospital Course:   The patient's hospital course was unremarkable.  She recovered as anticipated and experienced no post-operative complications.  On discharge, her pain was well controlled. Vaginal bleeding is similar to peak menstrual flow.  Voiding without difficulty.  Ambulating well and tolerating a normal diet.  No fever or significant wound drainage.  Breastfeeding well with supplementation.  Infant is stable.  Had a bowel movement prior to discharge.  She was discharged on post-partum day #3.    Post-partum hemoglobin:   Hemoglobin   Date Value Ref Range Status   2025 10.8 (L) 11.7 - 15.7 g/dL Final             Discharge Instructions and Follow-Up:     Discharge diet: Regular   Discharge activity: No lifting, driving, or strenuous exercise for 6 week(s)  Pelvic rest: abstain from intercourse and do not use tampons for 6 week(s)   Discharge follow-up: Follow up with OBGYN in 6 weeks   Wound care: Ice to area for comfort           Discharge Disposition:     Discharged to home      Attestation:  I have reviewed today's vital signs, notes, medications, labs and imaging.    Melodie Sesay DO     "

## 2025-05-02 NOTE — DISCHARGE INSTRUCTIONS
Warning Signs after Having a Baby    Keep this paper on your fridge or somewhere else where you can see it.    Call your provider if you have any of these symptoms up to 12 weeks after having your baby.    Thoughts of hurting yourself or your baby  Pain in your chest or trouble breathing  Severe headache not helped by pain medicine  Eyesight concerns (blurry vision, seeing spots or flashes of light, other changes to eyesight)  Fainting, shaking or other signs of a seizure    Call 9-1-1 if you feel that it is an emergency.     The symptoms below can happen to anyone after giving birth. They can be very serious. Call your provider if you have any of these warning signs.    My provider s phone number: Nurse triage line [phone]    Losing too much blood (hemorrhage)    Call your provider if you soak through a pad in less than an hour or pass blood clots bigger than a golf ball. These may be signs that you are bleeding too much.    Blood clots in the legs or lungs    After you give birth, your body naturally clots its blood to help prevent blood loss. Sometimes this increased clotting can happen in other areas of the body, like the legs or lungs. This can block your blood flow and be very dangerous.     Call your provider if you:  Have a red, swollen spot on the back of your leg that is warm or painful when you touch it.   Are coughing up blood.     Infection    Call your provider if you have any of these symptoms:  Fever of 100.4 F (38 C) or higher.  Pain or redness around your stitches if you had an incision.   Any yellow, white, or green fluid coming from places where you had stitches or surgery.    Mood Problems (postpartum depression)    Many people feel sad or have mood changes after having a baby. But for some people, these mood swings are worse.     Call your provider right away if you feel so anxious or nervous that you can't care for yourself or your baby.    Preeclampsia (high blood pressure)    Even if  you didn't have high blood pressure when you were pregnant, you are at risk for the high blood pressure disease called preeclampsia. This risk can last up to 12 weeks after giving birth.     Call your provider if you have:   Pain on your right side under your rib cage  Sudden swelling in the hands and face    Remember: You know your body. If something doesn't feel right, get medical help.     For informational purposes only. Not to replace the advice of your health care provider. Copyright 2020 [facility]. All rights reserved. Clinically reviewed by Rozina Murguia RNC-OB, MSN. in2nite [id number] - Rev 02/23.

## 2025-05-02 NOTE — PROGRESS NOTES
[facility] OB/GYN Department    Post-Partum Progress Note: POD #3    Name: Asmita Frye  Date: 2025    Subjective:   Patient seen and examined.  No complaints today.  Pain well controlled.  Tolerating regular diet, PO nausea or vomiting.  Ambulating without difficulty.  Barcenas removed post operatively, voiding spontaneously. + flatus, + BM. Breast feeding with supplementation.     ROS:    General/Constitutional:  Denies chills or fever  Respiratory: Denies shortness of breath  Cardiovascular: Denies chest pain  Gastrointestinal:  +mild uterine cramping, no nausea or vomiting, + flatus, + BM  Genitourinary: Denies difficulty urinating  Musculoskeletal: + peripheral edema      Objective:   No intake or output data in the 24 hours ending 25 0936    Patient Vitals for the past 24 hrs:   BP Temp Temp src Pulse Resp Weight   25 0031 118/53 98.2  F (36.8  C) Oral 80 16 --   25 1710 124/70 98.4  F (36.9  C) Oral 84 17 --   25 1100 -- -- -- -- -- 96.3 kg (212 lb 4.8 oz)       Recent Labs   Lab 25  0719 25  1534   HGB 10.8* 12.0         General appearance: well-hydrated, A&O x 3, no apparent distress  ENT: EOMI, sclera anicteric   Lungs: Equal expansion bilaterally, no accessory muscle use  Heart: No heaves or thrills. No peripheral varicosities  Constitutional: See vitals  Abdomen: Soft, non-distended, no rebound or rigidity. Incision c/d/i, uterus firm at umbilicus with non-tender fundus   Neurologic: CN II-XII grossly intact, no lateralizing defects, no gross movement abnormalities  Extremities: non pitting pedal edema, no calf tenderness      Assessment and Plan:    Insulin controlled gestational diabetes mellitus (GDM) in third trimester  Elevated fasting glucose post partum  Will still plan to discontinue insulin post partum  Will need 6 week follow up for diabetes screening    S/P repeat low transverse   POD#3 s/p RLTCS with bilateral  salpingectomy  Routine post operative care  Encourage ambulation  Optimize pain management  Lactation support  Anticipate discharge home today      Anemia due to blood loss, acute  Mild anemia post operatively  Continue iron replacement with Vitamins C and B12 for 2-3 weeks post partum  Continue prenatal vitamin with iron while breastfeeding    Class 2 obesity without serious comorbidity in adult  Lovenox and SCDs post partum for VTE prevention      Melodie Sesay,

## 2025-05-02 NOTE — PLAN OF CARE
Patient discharged per ambulatory (per patient request) with infant in car seat. Mother verified that her band matches her infant's band by comparing the infant's #.  Discharge instructions given. Encouraged to call for any problems, questions or concerns. RXs at [facility].        Dunia Hogan RN on 5/2/2025 at 12:04 PM

## 2025-05-02 NOTE — PLAN OF CARE
Data: Vital signs within normal limits. Postpartum checks within normal limits - see flow record. Patient eating and drinking normally. Patient able to empty bladder independently and is up ambulating. No apparent signs of infection. Incision healing well. Patient performing self cares and is able to care for infant.  Action: Patient medicated during the shift for pain. See MAR. Patient reassessed within 1 hour after each medication and pain was improved - patient stated she was comfortable. Patient education done about  care. See flow record.  Response: Positive attachment behaviors observed with infant. Support persons not present.   Plan: Anticipate discharge on 25.

## 2025-05-05 ENCOUNTER — PATIENT OUTREACH (OUTPATIENT)
Dept: CARE COORDINATION | Facility: CLINIC | Age: 35
End: 2025-05-05
Payer: COMMERCIAL

## 2025-05-05 NOTE — PROGRESS NOTES
[facility]: Transitions of Care Outreach  Chief Complaint   Patient presents with    Clinic Care Coordination - Post Hospital       Most Recent Admission Date: 2025   Most Recent Admission Diagnosis: Pregnancy with history of  section, antepartum - O34.219  Encounter for sterilization - Z30.2  Postoperative state - Z98.890     Most Recent Discharge Date: 2025   Most Recent Discharge Diagnosis: Care and examination of lactating mother - Z39.1  S/P repeat low transverse  - Z98.891     Transitions of Care Assessment    Discharge Assessment  How are you doing now that you are home?: Patient states she is feeling good.  stated she is fine, feels like things are better. Denies any  blurry vision, headache or face or hand swelling. Denies thoughts of harming self or others. Vaginal bleeding is similar to a normal period.  How are your symptoms? (Red Flag symptoms escalate to triage hotline per guidelines): Improved  Do you know how to contact your clinic care team if you have future questions or changes to your health status? : Yes  Does the patient have their discharge instructions? : Yes  Does the patient have questions regarding their discharge instructions? : No  Were you started on any new medications or were there changes to any of your previous medications? : Yes  Does the patient have all of their medications?: Yes  Do you have questions regarding any of your medications? : No  Do you have all of your needed medical supplies or equipment (DME)?  (i.e. oxygen tank, CPAP, cane, etc.): Yes         Post-op (Clinicians Only)  Did the patient have surgery or a procedure: Yes  Incision: closed  Drainage: No  Bleeding: none  Fever: No  Chills: No  Redness: No  Warmth: No  Swelling: No  Incision site pain: Yes (little pain but is better)  Eating & Drinking: eating and drinking without complaints/concerns  PO Intake: regular diet  Additional Symptoms:  (Denies)  Bowel  Function: normal (softer)  Date of last BM: 05/04/25  Urinary Status: voiding without complaint/concerns    Follow up Plan     Discharge Follow-Up  Discharge follow up appointment scheduled in alignment with recommended follow up timeframe or Transitions of Risk Category? (Low = within 30 days; Moderate= within 14 days; High= within 7 days): Yes (Per AVS)  Discharge Follow Up Appointment Date: 06/18/25  Discharge Follow Up Appointment Scheduled with?: Specialty Care Provider    Future Appointments   Date Time Provider Department Center   6/18/2025  2:00 PM Melodie Sesay DO [facility]       Outpatient Plan as outlined on AVS reviewed with patient.    For any urgent concerns, please contact our 24 hour nurse triage line: [phone] ([phone])       Keisha Mack RN

## 2025-06-03 ENCOUNTER — HOSPITAL ENCOUNTER (EMERGENCY)
Facility: CLINIC | Age: 35
Discharge: HOME | End: 2025-06-03
Attending: NURSE PRACTITIONER
Payer: COMMERCIAL

## 2025-06-03 ENCOUNTER — TELEPHONE (OUTPATIENT)
Dept: OBGYN | Facility: CLINIC | Age: 35
End: 2025-06-03
Payer: COMMERCIAL

## 2025-06-03 VITALS
HEART RATE: 65 BPM | RESPIRATION RATE: 16 BRPM | TEMPERATURE: 97.2 F | DIASTOLIC BLOOD PRESSURE: 77 MMHG | SYSTOLIC BLOOD PRESSURE: 113 MMHG | OXYGEN SATURATION: 97 %

## 2025-06-03 DIAGNOSIS — Z86.32 HISTORY OF GESTATIONAL DIABETES: Primary | ICD-10-CM

## 2025-06-03 DIAGNOSIS — L08.9 LOCAL INFECTION OF WOUND: ICD-10-CM

## 2025-06-03 DIAGNOSIS — T14.8XXA LOCAL INFECTION OF WOUND: ICD-10-CM

## 2025-06-03 PROCEDURE — 99213 OFFICE O/P EST LOW 20 MIN: CPT | Performed by: NURSE PRACTITIONER

## 2025-06-03 PROCEDURE — G0463 HOSPITAL OUTPT CLINIC VISIT: HCPCS | Performed by: NURSE PRACTITIONER

## 2025-06-03 ASSESSMENT — COLUMBIA-SUICIDE SEVERITY RATING SCALE - C-SSRS
6. HAVE YOU EVER DONE ANYTHING, STARTED TO DO ANYTHING, OR PREPARED TO DO ANYTHING TO END YOUR LIFE?: NO
1. IN THE PAST MONTH, HAVE YOU WISHED YOU WERE DEAD OR WISHED YOU COULD GO TO SLEEP AND NOT WAKE UP?: NO
2. HAVE YOU ACTUALLY HAD ANY THOUGHTS OF KILLING YOURSELF IN THE PAST MONTH?: NO

## 2025-06-03 ASSESSMENT — ACTIVITIES OF DAILY LIVING (ADL): ADLS_ACUITY_SCORE: 54

## 2025-06-03 NOTE — TELEPHONE ENCOUNTER
Spoke with patient through [place]occan interpretor    S-(situation): patient reporting small hole in  section incision     B-(background): C/S 2025    A-(assessment): patient reports  section incision is having discharge with occasional bleeding. Patient reports skin around incision appears to be red. Patient reports she can see a small hole when she takes a picture with her camera. Patient is not able to visualize otherwise. Patient reports the area is stinging and itchy.    Patient denies fevers.     R-(recommendations): Offered clinic appointment tomorrow to come and have it looked at. Patient is unable to come to clinic as she does not have transportation at that time. Patient will come to Urgent Care tonight when  is home from work.    6 week PP appointment rescheduled and lab appointment made for 2 hr non gestational lab test.    Ana PITT RN   [facility]

## 2025-06-03 NOTE — DISCHARGE INSTRUCTIONS
Recommend keeping the skin and the fold where the incision is clean and dry you can use a folded up dressing in the fold to prevent sweat from collecting.  If it anytime you develop a fever, chills, there is begins to be pus colored drainage you should be further evaluated for infection that is not improving.  Keflex is safe to use in breast-feeding as well.

## 2025-06-03 NOTE — ED PROVIDER NOTES
ED Provider Note  [facility]      History     Chief Complaint   Patient presents with    Post-op Problem     HPI  Asmita Frye is a 35 year old female who presents today with concern for wound infection of her  incision occurring for 2025.  Patient denies any fever, chills.  Denies any purulent drainage from the wound.  There is a small area where patient was concerned as she believes this opened up today.  Patient has been gestationally diabetic and is currently breast-feeding.            Allergies:  No Known Allergies    Problem List:    Patient Active Problem List    Diagnosis Date Noted    Elevated blood pressure reading 2025     Priority: Medium    Care and examination of lactating mother 2025     Priority: Medium    Anemia due to blood loss, acute 2025     Priority: Medium    Insulin controlled gestational diabetes mellitus (GDM) in third trimester 2025     Priority: Medium    S/P repeat low transverse  2023     Priority: Medium    History of gestational diabetes 2023     Priority: Medium    Class 2 obesity without serious comorbidity in adult 2023     Priority: Medium    History of  section 2023     Priority: Medium    Helicobacter pylori infection 2018     Priority: Medium    Obesity, Class II, BMI 35-39.9 2017     Priority: Medium        Past Medical History:    Past Medical History:   Diagnosis Date    Chickenpox        Past Surgical History:    Past Surgical History:   Procedure Laterality Date    APPENDECTOMY       SECTION N/A 2023    Procedure:  SECTION;  Surgeon: Melodie Sesay DO;  Location: [facility] OR    COMBINED  SECTION, SALPINGECTOMY BILATERAL Bilateral 2025    Procedure: Repeat  SECTION, WITH bilateral salpingectomy;  Surgeon: Barbi Tejeda MD;  Location: [facility] OR    GYN SURGERY         Social History:  Marital Status:    [2]  Social History     Tobacco Use    Smoking status: Former     Current packs/day: 0.00     Types: Cigarettes     Quit date: 2023     Years since quittin.3    Smokeless tobacco: Never   Vaping Use    Vaping status: Never Used   Substance Use Topics    Alcohol use: Not Currently    Drug use: Never        Medications:    acetaminophen (TYLENOL) 325 MG tablet  acetone urine (KETOSTIX) test strip  blood glucose (NO BRAND SPECIFIED) test strip  cephALEXin (KEFLEX) 250 MG capsule  cyanocobalamin (VITAMIN B-12) 1000 MCG tablet  ferrous sulfate (FEROSUL) 325 (65 Fe) MG tablet  ibuprofen (ADVIL/MOTRIN) 600 MG tablet  Insulin Pen Needle (PEN NEEDLES) 32G X 4 MM MISC  Lidocaine (LIDOCARE) 4 % Patch  omeprazole (PRILOSEC) 40 MG DR capsule  OneTouch Delica Lancets 33G MISC  oxyCODONE (ROXICODONE) 5 MG tablet  polyethylene glycol (MIRALAX) 17 GM/Dose powder  Prenatal Vit-Fe Fumarate-FA (PRENATAL MULTIVITAMIN W/IRON) 27-0.8 MG tablet  vitamin C (ASCORBIC ACID) 250 MG tablet          Review of Systems  A medically appropriate review of systems was performed with pertinent positives and negatives noted in the HPI, and all other systems negative.    Physical Exam   Patient Vitals for the past 24 hrs:   BP Temp Pulse Resp SpO2   25 1800 -- -- -- -- 97 %   25 1751 113/77 97.2  F (36.2  C) 65 16 97 %          Physical Exam  General: alert and in *** acute distress on arrival  Head: atraumatic, normocephalic  Lungs:  nonlabored  CV:  extremities warm and perfused  Abd: nondistended  Skin: no rashes, no diaphoresis and skin color normal  Neuro: Patient awake, alert, speech is fluent, no focal deficits  Psychiatric: affect/mood normal, appropriate historian      ED Course                 Procedures         {EKG done?:003687}           No results found for this or any previous visit (from the past 48 hours).    MEDICATIONS GIVEN IN THE EMERGENCY DEPARTMENT:  Medications - No data to display              Assessments & Plan (with Medical Decision Making)  35 year old female who presents to the Urgent Care for evaluation of ***      Patient verbalized agreement with and understanding of the rational for the diagnosis and treatment plan.  All questions were answered to best of my ability and the patient's satisfaction. The patient was advised to contact or return to their primary clinic or UC/ED with any questions that may arise after this visit.       I have reviewed the nursing notes.    I have reviewed the findings, diagnosis, plan and need for follow up with the patient.        NEW PRESCRIPTIONS STARTED AT TODAY'S ER VISIT  New Prescriptions    CEPHALEXIN (KEFLEX) 250 MG CAPSULE    Take 1 capsule (250 mg) by mouth 4 times daily for 7 days.       Final diagnoses:   None       6/3/2025   [facility] EMERGENCY DEPT    Disclaimer: This note consists of symbols derived from keyboarding, dictation, and/or voice recognition software. As a result, there may be errors in the script that have gone undetected.  Please consider this when interpreting information found in the chart.    occur.  Patient reports that she does have follow-up visit scheduled with her primary office.  Advised to return here if symptoms worsen.  Patient discharged home in stable condition.  Diagnosis local infection of wound  Treatment plan Keflex ordered 4 times daily for 7 days      Patient verbalized agreement with and understanding of the rational for the diagnosis and treatment plan.  All questions were answered to best of my ability and the patient's satisfaction. The patient was advised to contact or return to their primary clinic or UC/ED with any questions that may arise after this visit.       I have reviewed the nursing notes.    I have reviewed the findings, diagnosis, plan and need for follow up with the patient.        NEW PRESCRIPTIONS STARTED AT TODAY'S ER VISIT  New Prescriptions    CEPHALEXIN (KEFLEX) 250 MG CAPSULE    Take 1 capsule (250 mg) by mouth 4 times daily for 7 days.       Final diagnoses:   Local infection of wound       6/3/2025   [facility] EMERGENCY DEPT    Disclaimer: This note consists of symbols derived from keyboarding, dictation, and/or voice recognition software. As a result, there may be errors in the script that have gone undetected.  Please consider this when interpreting information found in the chart.      Destiny Marti, APRN CNP  06/12/25 1202

## 2025-06-18 ENCOUNTER — LAB (OUTPATIENT)
Dept: LAB | Facility: CLINIC | Age: 35
End: 2025-06-18
Payer: COMMERCIAL

## 2025-06-18 ENCOUNTER — RESULTS FOLLOW-UP (OUTPATIENT)
Dept: OBGYN | Facility: CLINIC | Age: 35
End: 2025-06-18

## 2025-06-18 ENCOUNTER — PRENATAL OFFICE VISIT (OUTPATIENT)
Dept: OBGYN | Facility: CLINIC | Age: 35
End: 2025-06-18
Payer: COMMERCIAL

## 2025-06-18 VITALS
BODY MASS INDEX: 36.74 KG/M2 | HEART RATE: 77 BPM | WEIGHT: 194.6 LBS | DIASTOLIC BLOOD PRESSURE: 75 MMHG | SYSTOLIC BLOOD PRESSURE: 109 MMHG | HEIGHT: 61 IN | RESPIRATION RATE: 18 BRPM | TEMPERATURE: 96.5 F

## 2025-06-18 DIAGNOSIS — O24.414 INSULIN CONTROLLED GESTATIONAL DIABETES MELLITUS (GDM) IN THIRD TRIMESTER: ICD-10-CM

## 2025-06-18 DIAGNOSIS — Z86.32 HISTORY OF GESTATIONAL DIABETES: Primary | ICD-10-CM

## 2025-06-18 DIAGNOSIS — Z98.891 S/P REPEAT LOW TRANSVERSE C-SECTION: ICD-10-CM

## 2025-06-18 LAB — NON GESTATIONAL GTT 2 HR POST DOSE: 110 MG/DL (ref 60–199)

## 2025-06-18 PROCEDURE — 99207 PR POST PARTUM EXAM: CPT | Performed by: OBSTETRICS & GYNECOLOGY

## 2025-06-18 PROCEDURE — 3078F DIAST BP <80 MM HG: CPT | Performed by: OBSTETRICS & GYNECOLOGY

## 2025-06-18 PROCEDURE — 3074F SYST BP LT 130 MM HG: CPT | Performed by: OBSTETRICS & GYNECOLOGY

## 2025-06-18 PROCEDURE — 82950 GLUCOSE TEST: CPT

## 2025-06-18 PROCEDURE — 36415 COLL VENOUS BLD VENIPUNCTURE: CPT

## 2025-06-18 PROCEDURE — 0503F POSTPARTUM CARE VISIT: CPT | Performed by: OBSTETRICS & GYNECOLOGY

## 2025-06-18 NOTE — NURSING NOTE
"Initial /75 (BP Location: Right arm, Patient Position: Sitting, Cuff Size: Adult Large)   Pulse 77   Temp (!) 96.5  F (35.8  C) (Tympanic)   Resp 18   Ht 1.549 m (5' 1\")   Wt 88.3 kg (194 lb 9.6 oz)   LMP 07/16/2024   Breastfeeding Yes   BMI 36.77 kg/m   Estimated body mass index is 36.77 kg/m  as calculated from the following:    Height as of this encounter: 1.549 m (5' 1\").    Weight as of this encounter: 88.3 kg (194 lb 9.6 oz). .    "

## 2025-06-18 NOTE — PROGRESS NOTES
"[facility]    Post Partum Office Visit    CC: Post partum visit    HPI: Asmita Frye is a 35 year old  who presents for a 6 week post-partum visit.  Patient delivered on 25, by Dr. Tejeda at 38w4d gestation.  Patient delivered via repeat c/s, with bilateral salpingectomy.  Patient presented in labor, proceeded with delivery. Patient doing well post partum. Was seen in the ED 2 weeks ago due to pin point hole in incision with some drainage. This has resolved now.      Information  Name: Aryan  Sex: male  Weight: 8 lbs. 3 oz.  Complications: None  Feeding Method:breast milk with supplementation    Maternal Information  Postpartum Depression:denies  Resumed [place]:denies  Last Pap Smear: 2023 NIL, HPV negative  Birth Control Method:bilateral salpingectomy    ROS:  General/Constitutional:  Denies chills or fever  Gastrointestinal:  Denies abdominal pain, constipation, nausea, or vomiting  Genitourinary: Denies hematuria, difficulty urinating, frequency, or dysuria   Musculoskeletal: no peripheral edema  Psychiatric: Denies post partum depression    Physical Exam:   Vitals:    25 1015   BP: 109/75   BP Location: Right arm   Patient Position: Sitting   Cuff Size: Adult Large   Pulse: 77   Resp: 18   Temp: (!) 96.5  F (35.8  C)   TempSrc: Tympanic   Weight: 88.3 kg (194 lb 9.6 oz)   Height: 1.549 m (5' 1\")      Estimated body mass index is 36.77 kg/m  as calculated from the following:    Height as of this encounter: 1.549 m (5' 1\").    Weight as of this encounter: 88.3 kg (194 lb 9.6 oz).    General appearance: well-hydrated, A&O x 3, no apparent distress  Lungs: Equal expansion bilaterally, no accessory muscle use  Heart: No heaves or thrills.   Abdomen: Soft, non-tender, non-distended. No rebound, rigidity, or guarding. Incision well healed.  Extremities: no edema  Neuro: CN II-XII grossly intact      Assessment and Plan:     Encounter Diagnoses   Name " Primary?    Insulin controlled gestational diabetes mellitus (GDM) in third trimester     S/P repeat low transverse      Routine postpartum follow-up Yes       Appropriate post partum recovery.    Continued GERD symptoms, recommend PCP follow up for evaluation for persistent symptoms outside of pregnancy.    Doing 2 hour GTT today due to history of GDMA2.     Bilateral salpingectomy performed for contraception.     Plan for routine GYN cares, PAP smear due .       Melodie Sesay, DO

## (undated) DEVICE — GLOVE BIOGEL PI ULTRATOUCH SZ 6.5 41165

## (undated) DEVICE — SU VICRYL 4-0 FS-2 27" J422-H

## (undated) DEVICE — STOCKING SLEEVE COMPRESSION CALF MED

## (undated) DEVICE — LABEL MEDICATION SYSTEM  3304

## (undated) DEVICE — ESU PENCIL SMOKE EVAC W/ROCKER SWITCH 0703-047-000

## (undated) DEVICE — BLADE CLIPPER 4406

## (undated) DEVICE — SU PLAIN 3-0 CT 27" 852H

## (undated) DEVICE — SURGICEL HEMOSTAT 4X8" 1952S

## (undated) DEVICE — GLOVE BIOGEL PI MICRO INDICATOR UNDERGLOVE SZ 7.0 48970

## (undated) DEVICE — SUCTION MANIFOLD NEPTUNE 2 SYS 1 PORT 702-025-000

## (undated) DEVICE — STRAP KNEE/BODY 31143004

## (undated) DEVICE — Device

## (undated) DEVICE — PREP CHLORAPREP 26ML TINTED ORANGE  260815

## (undated) DEVICE — SU MONOCRYL 0 CT-1 27" Y340H

## (undated) DEVICE — CATH TRAY FOLEY SURESTEP 16FR W/URINE MTR STATLK LF A303416A

## (undated) DEVICE — SU VICRYL 0 CTX 36" UND J978H

## (undated) DEVICE — SU DERMABOND ADVANCED .7ML DNX12

## (undated) DEVICE — SOL WATER IRRIG 1000ML BOTTLE 07139-09

## (undated) DEVICE — SU VICRYL 0 CT-1 36" J946H

## (undated) DEVICE — PACK C-SECTION LF PL15OTA83B

## (undated) DEVICE — SU MONOCRYL 4-0 PS-2 18" UND Y496G

## (undated) DEVICE — ESU LIGASURE OPEN SEALER/DIVIDER SM JAW 16.5MM LF1212A

## (undated) DEVICE — DRAPE SHEET REV FOLD 3/4 9349

## (undated) DEVICE — SOL NACL 0.9% IRRIG 1000ML BOTTLE 07138-09

## (undated) DEVICE — GLOVE BIOGEL PI MICRO SZ 6.5 48565

## (undated) DEVICE — HEMOSTAT ABSORBABLE AGENT ARISTA 3GM POWDER SM0002-USA

## (undated) RX ORDER — FENTANYL CITRATE 50 UG/ML
INJECTION, SOLUTION INTRAMUSCULAR; INTRAVENOUS
Status: DISPENSED
Start: 2023-12-21

## (undated) RX ORDER — MORPHINE SULFATE 1 MG/ML
INJECTION, SOLUTION EPIDURAL; INTRATHECAL; INTRAVENOUS
Status: DISPENSED
Start: 2025-04-29

## (undated) RX ORDER — FENTANYL CITRATE-0.9 % NACL/PF 10 MCG/ML
PLASTIC BAG, INJECTION (ML) INTRAVENOUS
Status: DISPENSED
Start: 2025-04-29

## (undated) RX ORDER — OXYTOCIN/0.9 % SODIUM CHLORIDE 30/500 ML
PLASTIC BAG, INJECTION (ML) INTRAVENOUS
Status: DISPENSED
Start: 2023-12-21

## (undated) RX ORDER — PHENYLEPHRINE HYDROCHLORIDE 10 MG/ML
INJECTION INTRAVENOUS
Status: DISPENSED
Start: 2023-12-21

## (undated) RX ORDER — DEXAMETHASONE SODIUM PHOSPHATE 4 MG/ML
INJECTION, SOLUTION INTRA-ARTICULAR; INTRALESIONAL; INTRAMUSCULAR; INTRAVENOUS; SOFT TISSUE
Status: DISPENSED
Start: 2025-04-29

## (undated) RX ORDER — ONDANSETRON 2 MG/ML
INJECTION INTRAMUSCULAR; INTRAVENOUS
Status: DISPENSED
Start: 2025-04-29

## (undated) RX ORDER — ONDANSETRON 2 MG/ML
INJECTION INTRAMUSCULAR; INTRAVENOUS
Status: DISPENSED
Start: 2023-12-21

## (undated) RX ORDER — KETOROLAC TROMETHAMINE 30 MG/ML
INJECTION, SOLUTION INTRAMUSCULAR; INTRAVENOUS
Status: DISPENSED
Start: 2025-04-29

## (undated) RX ORDER — FENTANYL CITRATE-0.9 % NACL/PF 10 MCG/ML
PLASTIC BAG, INJECTION (ML) INTRAVENOUS
Status: DISPENSED
Start: 2023-12-21

## (undated) RX ORDER — MORPHINE SULFATE 1 MG/ML
INJECTION, SOLUTION EPIDURAL; INTRATHECAL; INTRAVENOUS
Status: DISPENSED
Start: 2023-12-21

## (undated) RX ORDER — KETOROLAC TROMETHAMINE 30 MG/ML
INJECTION, SOLUTION INTRAMUSCULAR; INTRAVENOUS
Status: DISPENSED
Start: 2023-12-21